# Patient Record
Sex: FEMALE | Race: WHITE | NOT HISPANIC OR LATINO | Employment: FULL TIME | URBAN - METROPOLITAN AREA
[De-identification: names, ages, dates, MRNs, and addresses within clinical notes are randomized per-mention and may not be internally consistent; named-entity substitution may affect disease eponyms.]

---

## 2017-02-14 ENCOUNTER — GENERIC CONVERSION - ENCOUNTER (OUTPATIENT)
Dept: OTHER | Facility: OTHER | Age: 42
End: 2017-02-14

## 2017-12-21 ENCOUNTER — ALLSCRIPTS OFFICE VISIT (OUTPATIENT)
Dept: OTHER | Facility: OTHER | Age: 42
End: 2017-12-21

## 2017-12-21 DIAGNOSIS — S49.91XA UNSPECIFIED INJURY OF RIGHT SHOULDER AND UPPER ARM, INITIAL ENCOUNTER: ICD-10-CM

## 2017-12-21 DIAGNOSIS — R10.11 RIGHT UPPER QUADRANT PAIN: ICD-10-CM

## 2017-12-21 DIAGNOSIS — M54.50 LOW BACK PAIN: ICD-10-CM

## 2017-12-23 NOTE — PROGRESS NOTES
Assessment   1  Lumbago (724 2) (M54 5)   2  Recurrent major depressive episodes, moderate (296 32) (F33 1)   3  Abdominal pain, RUQ (789 01) (R10 11)   4  Anxiety (300 00) (F41 9)    Plan   Abdominal pain, RUQ    · US ABDOMEN LIMITED; Status:Hold For - Scheduling; Requested for:14Uiw3011; Anxiety, Depression with anxiety    · Vraylar 3 MG Oral Capsule; Take one by mouth daily  Depression with anxiety    · ALPRAZolam 0 5 MG Oral Tablet; TAKE ONE TABLET BY MOUTH THREE TIMES DAILY AS    NEEDED   · (1) TSH; Status:Active; Requested for:72Hne4534;   Esophageal reflux    · Omeprazole 40 MG Oral Capsule Delayed Release; TAKE 1 CAPSULE DAILY  Hyperlipidemia    · (1) COMPREHENSIVE METABOLIC PANEL; Status:Active; Requested for:42Ljr5492;    · (1) LIPID PANEL FASTING W DIRECT LDL REFLEX; Status:Active; Requested for:60Ajd2267;   Hypertension    · (1) CBC/PLT/DIFF; Status:Active; Requested for:42Zwv8894;   Lumbago    · *1 - [facility]  *  Status: Active  Requested for: 96Hch8849  Care Summary provided  : Yes  Visit for screening mammogram    · MAMMO SCREENING BILATERAL W 3D & CAD; Status:Hold For - Scheduling; Requested    for:55Jev5634;   Vitamin D deficiency    · (1) VITAMIN D 25-HYDROXY; Status:Active; Requested for:23Jxb7284;     Discussion/Summary   Discussion Summary:    Check routine labs  Will get ultrasound RUQ to evaluate GI complaints  If negative consider HIDA scan  Will start vraylar to help with ongoing depression and anhedonia  Contact info given for [facility]  Will also refer to PT for eval of ongoing back pain  Chief Complaint   Chief Complaint Free Text Note Form: Patient is being seen today for a follow up visit  Patient c/o abdominal pain, back pain, fluid retention and would like to discuss her depression  Chief Complaint Chronic Condition [facility]: Patient is here today for follow up of chronic conditions described in HPI        History of Present Illness   HPI: Pt presents with several complaints  She has not been seen in 1 year  She complains of ongoing depression  She has poor energy and motivation  She is on celexa which has provided some benefit  She previously tried fetzima, wellbutrin, abilify and rexulti but could not tolerate most of them  also complains of abdominal cramping and diarrhea shortly after eating  Sometimes she gets nauseated  This has been episodic over the last several months  is also having ongoing low back pain  The motrin does help  She is not interested in more medication but is interested in trying physical therapy  Review of Systems   Complete-Female:      Constitutional: as noted in HPI  Eyes: No complaints of eye pain, no red eyes, no eyesight problems, no discharge, no dry eyes, no itching of eyes  ENT: no complaints of earache, no loss of hearing, no nose bleeds, no nasal discharge, no sore throat, no hoarseness  Cardiovascular: No complaints of slow heart rate, no fast heart rate, no chest pain, no palpitations, no leg claudication, no lower extremity edema  Respiratory: No complaints of shortness of breath, no wheezing, no cough, no SOB on exertion, no orthopnea, no PND  Gastrointestinal: as noted in HPI  Genitourinary: No complaints of dysuria, no incontinence, no pelvic pain, no dysmenorrhea, no vaginal discharge or bleeding  Musculoskeletal: as noted in HPI  Integumentary: No complaints of skin rash or lesions, no itching, no skin wounds, no breast pain or lump  Neurological: No complaints of headache, no confusion, no convulsions, no numbness, no dizziness or fainting, no tingling, no limb weakness, no difficulty walking  Psychiatric: as noted in HPI  ROS Reviewed:    ROS reviewed  Active Problems   1  Abdominal pain, RUQ (789 01) (R10 11)   2  Anxiety (300 00) (F41 9)   3  Contraceptives (V25 02)   4  Depression with anxiety (300 4) (F41 8)   5   Encounter for vitamin deficiency screening (V77 99) (Z13 21)   6  Esophageal reflux (530 81) (K21 9)   7  Hyperlipidemia (272 4) (E78 5)   8  Hypertension (401 9) (I10)   9  Lumbago (724 2) (M54 5)   10  Multiple joint pain (719 49) (M25 50)   11  Obesity (278 00) (E66 9)   12  Recurrent major depressive episodes, moderate (296 32) (F33 1)   13  Screening, anemia, deficiency, iron (V78 0) (Z13 0)   14  Sleep disorder (780 50) (G47 9)   15  Thyroid disorder screen (V77 0) (Z13 29)   16  Uncomplicated alcohol abuse (305 00) (F10 10)   17  Visit for screening (V82 9) (Z13 9)   18  Visit for screening mammogram (V76 12) (Z12 31)   19  Vitamin D deficiency (268 9) (E55 9)    Past Medical History   1  Acute upper respiratory infection (465 9) (J06 9)   2  Acute upper respiratory infection (465 9) (J06 9)   3  History of acute sinusitis (V12 69) (Z87 09)   4  History of candidiasis of mouth (V12 09) (Z86 19)   5  History of sciatica (V12 49) (Z86 69)   6  History of urticaria (V13 3) (Z87 2)   7  History of Skin rash (782 1) (R21)  Active Problems And Past Medical History Reviewed: The active problems and past medical history were reviewed and updated today  Surgical History   1  Contraceptives (V25 02)   2  History of Dental Surgery  Surgical History Reviewed: The surgical history was reviewed and updated today  Family History   Mother    1  Family history of Depression   2  Family history of Osteoporosis (V17 81)  Father    3  Family history of Cancer   4  Family history of Hypertension (V17 49)   5  Family history of Testicular Cancer (V16 43)  Family History    6  Family history of Cancer   7  Family history of Diabetes Mellitus (V18 0)   8  Family history of Heart Disease (V17 49)   9  Family history of Stroke Syndrome (V17 1)  Family History Reviewed: The family history was reviewed and updated today         Social History    · Being A Social Drinker   · Caffeine Use   · Denied: History of Drug Use   · Marital History - Currently    · Never A Smoker   · Occupation:   · Denied: History of Previous  Service   · Sexually Active Monogamous Relationship   · Uses Safety Equipment - Seatbelts   · Working Full Time  Social History Reviewed: The social history was reviewed and updated today  The social history was reviewed and is unchanged  Current Meds    1  ALPRAZolam 0 5 MG Oral Tablet; TAKE ONE TABLET BY MOUTH THREE TIMES DAILY AS NEEDED; Therapy: 69IER6807 to (Evaluate:02Dec2016); Last Rx:02Nov2016 Ordered   2  Citalopram Hydrobromide 40 MG Oral Tablet; TAKE 1 TABLET DAILY; Therapy: 58Ipk3165 to (Last Rx:39Wtm4525)  Requested for: 79Gjh8185 Ordered   3  Ibuprofen 200 MG Oral Tablet; Take 1-3 every 4-6 hours as needed; Therapy: 88YZE9028 to (Evaluate:09Jan2013); Last VO:34FBW5104 Ordered   4  Omeprazole 40 MG Oral Capsule Delayed Release; TAKE 1 CAPSULE DAILY; Therapy: 99LRL4101 to (Evaluate:12Mar2018)  Requested for: 93Jhq5213; Last Rx:58Rvn7085     Ordered  Medication List Reviewed: The medication list was reviewed and updated today  Allergies   1  No Known Drug Allergies  2  Seasonal    Vitals   Vital Signs    Recorded: 21Dec2017 08:37AM   Temperature 97 5 F, Tympanic   Heart Rate 99   Respiration 16   Systolic 526, Sitting   Diastolic 64, Sitting   Height 5 ft 3 in   Weight 232 lb 4 0 oz   BMI Calculated 41 14   BSA Calculated 2 06   O2 Saturation 98     Physical Exam        Constitutional      General appearance: No acute distress, well appearing and well nourished  Ears, Nose, Mouth, and Throat      External inspection of ears and nose: Normal        Otoscopic examination: Tympanic membranes translucent with normal light reflex  Canals patent without erythema  Nasal mucosa, septum, and turbinates: Normal without edema or erythema  Oropharynx: Normal with no erythema, edema, exudate or lesions         Pulmonary      Respiratory effort: No increased work of breathing or signs of respiratory distress  Auscultation of lungs: Clear to auscultation  Cardiovascular      Auscultation of heart: Normal rate and rhythm, normal S1 and S2, without murmurs  Examination of extremities for edema and/or varicosities: Normal        Carotid pulses: Normal        Abdomen      Abdomen: Non-tender, no masses  Psychiatric      Orientation to person, place, and time: Normal        Mood and affect: Normal           Results/Data   PHQ-9 Adult Depression Screening 91Mik5117 08:45AM User, s      Test Name Result Flag Reference   PHQ-9 Adult Depression Score 12     Over the last two weeks, how often have you been bothered by any of the following problems? Little interest or pleasure in doing things: Several days - 1     Feeling down, depressed, or hopeless: Several days - 1     Trouble falling or staying asleep, or sleeping too much: Nearly every day - 3     Feeling tired or having little energy: Nearly every day - 3     Poor appetite or over eating: Nearly every day - 3     Feeling bad about yourself - or that you are a failure or have let yourself or your family down: Several days - 1     Trouble concentrating on things, such as reading the newspaper or watching television: Not at all - 0     Moving or speaking so slowly that other people could have noticed   Or the opposite -  being so fidgety or restless that you have been moving around a lot more than usual: Not at all - 0     Thoughts that you would be better off dead, or of hurting yourself in some way: Not at all - 0   PHQ-9 Adult Depression Screening Positive     PHQ-9 Difficulty Level Somewhat difficult     PHQ-9 Severity Moderate Depression          Signatures    Electronically signed by : ROSALINDA Erickson; Dec 22 2017 11:47AM EST                       (Author)     Electronically signed by : GOMEZ Jacobsen ; Dec 22 2017  1:05PM EST                       (Author)

## 2018-01-10 ENCOUNTER — APPOINTMENT (OUTPATIENT)
Dept: RADIOLOGY | Facility: CLINIC | Age: 43
End: 2018-01-10
Payer: COMMERCIAL

## 2018-01-10 ENCOUNTER — OFFICE VISIT (OUTPATIENT)
Dept: URGENT CARE | Facility: CLINIC | Age: 43
End: 2018-01-10
Payer: COMMERCIAL

## 2018-01-10 DIAGNOSIS — S49.91XA UNSPECIFIED INJURY OF RIGHT SHOULDER AND UPPER ARM, INITIAL ENCOUNTER: ICD-10-CM

## 2018-01-10 PROCEDURE — S9088 SERVICES PROVIDED IN URGENT: HCPCS

## 2018-01-10 PROCEDURE — 73030 X-RAY EXAM OF SHOULDER: CPT

## 2018-01-10 PROCEDURE — 99204 OFFICE O/P NEW MOD 45 MIN: CPT

## 2018-01-13 NOTE — PROGRESS NOTES
Assessment   1  Injury of right shoulder, initial encounter (959 2) (S49 91XA)    Plan   Injury of right shoulder, initial encounter    · * XR SHOULDER 2+ VIEW RIGHT; Status:Active; Requested OOW:13LRU9858; Discussion/Summary   Discussion Summary:    X-rays  Negative for any fracture  Recommend patient continue to do range-of-motion exercises  Ice every couple of hours for 20-30 minutes  Recommend ibuprofen help reduce inflammation  If no improvement over the next week, follow up with PCP  History of Present Illness   HPI: 82-year-old female here after slipping on the ice earlier today and landing on her right shoulder  Has excruciating right shoulder pain and still states that she is unable to move it and lift it above her head  Denies any numbness or tingling in her upper extremity  Review of Systems   Focused-Female:      Constitutional: No fever, no chills, feels well, no tiredness, no recent weight gain or loss  Cardiovascular: no complaints of slow or fast heart rate, no chest pain, no palpitations, no leg claudication or lower extremity edema  Respiratory: no complaints of shortness of breath, no wheezing, no dyspnea on exertion, no orthopnea or PND  Musculoskeletal: as noted in HPI  Neurological: no complaints of headache, no confusion, no numbness or tingling, no dizziness or fainting  ROS Reviewed:    ROS reviewed  Active Problems   1  Abdominal pain, RUQ (789 01) (R10 11)   2  Anxiety (300 00) (F41 9)   3  Contraceptives (V25 02)   4  Depression with anxiety (300 4) (F41 8)   5  Encounter for vitamin deficiency screening (V77 99) (Z13 21)   6  Esophageal reflux (530 81) (K21 9)   7  Hyperlipidemia (272 4) (E78 5)   8  Hypertension (401 9) (I10)   9  Lumbago (724 2) (M54 5)   10  Multiple joint pain (719 49) (M25 50)   11  Obesity (278 00) (E66 9)   12  Recurrent major depressive episodes, moderate (296 32) (F33 1)   13   Screening, anemia, deficiency, iron (V78 0) (Z13 0)   14  Sleep disorder (780 50) (G47 9)   15  Thyroid disorder screen (V77 0) (Z13 29)   16  Uncomplicated alcohol abuse (305 00) (F10 10)   17  Visit for screening (V82 9) (Z13 9)   18  Visit for screening mammogram (V76 12) (Z12 31)   19  Vitamin D deficiency (268 9) (E55 9)    Past Medical History   1  Acute upper respiratory infection (465 9) (J06 9)   2  Acute upper respiratory infection (465 9) (J06 9)   3  History of acute sinusitis (V12 69) (Z87 09)   4  History of candidiasis of mouth (V12 09) (Z86 19)   5  History of sciatica (V12 49) (Z86 69)   6  History of urticaria (V13 3) (Z87 2)   7  History of Skin rash (782 1) (R21)  Active Problems And Past Medical History Reviewed: The active problems and past medical history were reviewed and updated today  Family History   Mother    1  Family history of Depression   2  Family history of Osteoporosis (V17 81)  Father    3  Family history of Cancer   4  Family history of Hypertension (V17 49)   5  Family history of Testicular Cancer (V16 43)  Family History    6  Family history of Cancer   7  Family history of Diabetes Mellitus (V18 0)   8  Family history of Heart Disease (V17 49)   9  Family history of Stroke Syndrome (V17 1)  Family History Reviewed: The family history was reviewed and updated today  Social History    · Being A Social Drinker   · Caffeine Use   · Denied: History of Drug Use   · Marital History - Currently    · Never A Smoker   · Occupation:   · Denied: History of Previous  Service   · Sexually Active Monogamous Relationship   · Uses Safety Equipment - Seatbelts   · Working Full Time  Social History Reviewed: The social history was reviewed and updated today  The social history was reviewed and is unchanged  Surgical History   1  Contraceptives (V25 02)   2  History of Dental Surgery  Surgical History Reviewed: The surgical history was reviewed and updated today  Current Meds    1   ALPRAZolam 0 5 MG Oral Tablet; TAKE ONE TABLET BY MOUTH THREE TIMES DAILY     AS NEEDED; Therapy: 40QDA1948 to (Evaluate:20Jan2018); Last Rx:65Tdp5535 Ordered   2  Citalopram Hydrobromide 40 MG Oral Tablet; TAKE 1 TABLET DAILY; Therapy: 31Lly7734 to (Last Rx:92Egl9522)  Requested for: 41Xaq9918 Ordered   3  Ibuprofen 200 MG Oral Tablet; Take 1-3 every 4-6 hours as needed; Therapy: 68FHS5659 to (Evaluate:09Jan2013); Last WE:09PKV4498 Ordered   4  Omeprazole 40 MG Oral Capsule Delayed Release; TAKE 1 CAPSULE DAILY; Therapy: 08ANR3294 to (Evaluate:97Hyy7609)  Requested for: 10Hke0722; Last     Rx:56Btx1759 Ordered   5  Vraylar 3 MG Oral Capsule; Take one by mouth daily; Therapy: 05Jvc7636 to (Last Dellia Slight)  Requested for: 49Gus8928 Ordered  Medication List Reviewed: The medication list was reviewed and updated today  Allergies   1  No Known Drug Allergies  2  Seasonal    Vitals   Signs   Recorded: 56MBL0416 06:40PM   Temperature: 97 1 F  Heart Rate: 68  Respiration: 16  Systolic: 750  Diastolic: 68    Physical Exam        Constitutional      General appearance: No acute distress, well appearing and well nourished  Pulmonary      Respiratory effort: No increased work of breathing or signs of respiratory distress  Auscultation of lungs: Clear to auscultation  Cardiovascular      Auscultation of heart: Normal rate and rhythm, normal S1 and S2, without murmurs  Musculoskeletal      Gait and station: Normal  -- Right shoulder with limited range of motion overhead with anterior flexion and with abduction and abduction  Able to internally and externally rotate  Sensory and neuro intact grossly  Pulses +2 bilateral radial  Nontender to palpation over muscles of the rotator cuff  Tender to palpation over humeral head        Signatures    Electronically signed by : Devan Claros [facility]; Romeo 10 2018  6:42PM EST                       (Author)     Electronically signed by : Ann Quintana FABIAN Curran O ; Jan 12 2018 10:25AM EST                       (Co-author)

## 2018-01-13 NOTE — MISCELLANEOUS
Message  Return to work or school:   Tomeka Hamm is under my professional care  She was seen in my office on   She is able to return to work on  [id number]      Please excuse from work 02/13/2017 any questions contact our office          Signatures   Electronically signed by : Dann Schafer, ; Feb 14 2017  1:30PM EST                       (Author)

## 2018-01-23 VITALS
HEART RATE: 68 BPM | SYSTOLIC BLOOD PRESSURE: 118 MMHG | TEMPERATURE: 97.1 F | DIASTOLIC BLOOD PRESSURE: 68 MMHG | RESPIRATION RATE: 16 BRPM

## 2018-01-23 VITALS
HEIGHT: 63 IN | SYSTOLIC BLOOD PRESSURE: 116 MMHG | WEIGHT: 232.25 LBS | BODY MASS INDEX: 41.15 KG/M2 | OXYGEN SATURATION: 98 % | RESPIRATION RATE: 16 BRPM | DIASTOLIC BLOOD PRESSURE: 64 MMHG | TEMPERATURE: 97.5 F | HEART RATE: 99 BPM

## 2018-01-24 NOTE — MISCELLANEOUS
Message  Return to work or school:   Darlene Townsend is under my professional care   She was seen in my office on 1/10/18    She is not able to return to work until 1/12/18           Signatures   Electronically signed by : Janelle Millan, [facility]; Romeo 10 2018  6:32PM EST                       (Author)

## 2018-02-10 DIAGNOSIS — F33.41 RECURRENT MAJOR DEPRESSIVE DISORDER, IN PARTIAL REMISSION (HCC): Primary | ICD-10-CM

## 2018-02-12 RX ORDER — CITALOPRAM 40 MG/1
TABLET ORAL
Qty: 30 TABLET | Refills: 0 | Status: SHIPPED | OUTPATIENT
Start: 2018-02-12 | End: 2018-04-03 | Stop reason: SDUPTHER

## 2018-04-03 DIAGNOSIS — F33.41 RECURRENT MAJOR DEPRESSIVE DISORDER, IN PARTIAL REMISSION (HCC): ICD-10-CM

## 2018-04-03 RX ORDER — CITALOPRAM 40 MG/1
TABLET ORAL
Qty: 30 TABLET | Refills: 0 | Status: SHIPPED | OUTPATIENT
Start: 2018-04-03 | End: 2018-06-21 | Stop reason: SDUPTHER

## 2018-06-21 DIAGNOSIS — F33.41 RECURRENT MAJOR DEPRESSIVE DISORDER, IN PARTIAL REMISSION (HCC): ICD-10-CM

## 2018-06-21 RX ORDER — CITALOPRAM 40 MG/1
TABLET ORAL
Qty: 30 TABLET | Refills: 0 | Status: SHIPPED | OUTPATIENT
Start: 2018-06-21 | End: 2018-09-05 | Stop reason: SDUPTHER

## 2018-07-09 ENCOUNTER — OFFICE VISIT (OUTPATIENT)
Dept: FAMILY MEDICINE CLINIC | Facility: CLINIC | Age: 43
End: 2018-07-09
Payer: COMMERCIAL

## 2018-07-09 VITALS
HEART RATE: 68 BPM | WEIGHT: 212 LBS | TEMPERATURE: 98.9 F | DIASTOLIC BLOOD PRESSURE: 84 MMHG | BODY MASS INDEX: 37.56 KG/M2 | SYSTOLIC BLOOD PRESSURE: 138 MMHG | RESPIRATION RATE: 20 BRPM | HEIGHT: 63 IN

## 2018-07-09 DIAGNOSIS — M54.31 RIGHT SIDED SCIATICA: Primary | ICD-10-CM

## 2018-07-09 PROBLEM — M54.50 LOW BACK PAIN: Status: ACTIVE | Noted: 2017-12-21

## 2018-07-09 PROCEDURE — 99214 OFFICE O/P EST MOD 30 MIN: CPT | Performed by: PHYSICIAN ASSISTANT

## 2018-07-09 RX ORDER — METHYLPREDNISOLONE 4 MG/1
TABLET ORAL
Qty: 21 TABLET | Refills: 0 | Status: SHIPPED | OUTPATIENT
Start: 2018-07-09 | End: 2018-07-16 | Stop reason: ALTCHOICE

## 2018-07-09 RX ORDER — ALPRAZOLAM 0.5 MG/1
1 TABLET ORAL 3 TIMES DAILY PRN
COMMUNITY
Start: 2014-03-05 | End: 2019-10-16 | Stop reason: SDUPTHER

## 2018-07-09 RX ORDER — CYCLOBENZAPRINE HCL 10 MG
10 TABLET ORAL 3 TIMES DAILY PRN
Qty: 30 TABLET | Refills: 0 | Status: SHIPPED | OUTPATIENT
Start: 2018-07-09 | End: 2019-03-13 | Stop reason: ALTCHOICE

## 2018-07-09 RX ORDER — OMEPRAZOLE 40 MG/1
1 CAPSULE, DELAYED RELEASE ORAL DAILY
COMMUNITY
Start: 2014-12-19 | End: 2018-07-09 | Stop reason: ALTCHOICE

## 2018-07-09 RX ORDER — KETOROLAC TROMETHAMINE 30 MG/ML
60 INJECTION, SOLUTION INTRAMUSCULAR; INTRAVENOUS ONCE
Status: COMPLETED | OUTPATIENT
Start: 2018-07-09 | End: 2018-07-09

## 2018-07-09 RX ADMIN — KETOROLAC TROMETHAMINE 60 MG: 30 INJECTION, SOLUTION INTRAMUSCULAR; INTRAVENOUS at 15:48

## 2018-07-09 NOTE — ASSESSMENT & PLAN NOTE
Pt with flare of right sciatica with new symptoms of urinary incontinence and increased pain severity without less provoking  Will update MRI  Will give 60mg toradol, medrol dose pack and muscle relaxer  Pt counseled that the muscle relaxer may make her drowsy

## 2018-07-09 NOTE — PROGRESS NOTES
Assessment/Plan:    Right sided sciatica  Pt with flare of right sciatica with new symptoms of urinary incontinence and increased pain severity without less provoking  Will update MRI  Will give 60mg toradol, medrol dose pack and muscle relaxer  Pt counseled that the muscle relaxer may make her drowsy  Diagnoses and all orders for this visit:    Right sided sciatica  -     Methylprednisolone 4 MG TBPK; Use as directed on package  -     cyclobenzaprine (FLEXERIL) 10 mg tablet; Take 1 tablet (10 mg total) by mouth 3 (three) times a day as needed for muscle spasms  -     MRI lumbar spine w wo contrast; Future  -     ketorolac (TORADOL) 60 mg/2 mL IM injection 60 mg; Inject 2 mL (60 mg total) into a muscle once     Other orders  -     ALPRAZolam (XANAX) 0 5 mg tablet; Take 1 tablet by mouth 3 (three) times a day as needed  -     Discontinue: omeprazole (PriLOSEC) 40 MG capsule; Take 1 capsule by mouth daily  -     Discontinue: Brexpiprazole (REXULTI) 1 MG tablet; Take by mouth daily  -     Discontinue: cariprazine (VRAYLAR) 3 MG capsule; Take by mouth daily          Subjective:      Patient ID: Kristi Espinal is a 41 y o  female  Back Pain   This is a recurrent problem  The current episode started in the past 7 days  The problem occurs constantly  The problem has been gradually worsening since onset  The pain is present in the lumbar spine and gluteal  The quality of the pain is described as aching, shooting and stabbing  The pain radiates to the right thigh  The pain is at a severity of 8/10  The pain is severe  The pain is the same all the time  The symptoms are aggravated by position  Associated symptoms include bladder incontinence, leg pain, numbness, paresthesias and tingling  Pertinent negatives include no abdominal pain, chest pain, dysuria, fever, headaches or weakness  Risk factors include obesity  She has tried analgesics for the symptoms  The treatment provided no relief         The following portions of the patient's history were reviewed and updated as appropriate:   She  has a past medical history of Anxiety and Depression  She   Patient Active Problem List    Diagnosis Date Noted    Right sided sciatica 07/09/2018    Low back pain 12/21/2017    Recurrent major depressive episodes, moderate ([facility] [address] ) 04/15/2013    Hyperlipidemia 11/13/2012    Hypertension 11/13/2012     She  has no past surgical history on file  Her family history includes Depression in her mother; Testicular cancer in her father  She  reports that she has never smoked  She has never used smokeless tobacco  She reports that she drinks about 1 8 oz of alcohol per week   She reports that she does not use drugs  Current Outpatient Prescriptions   Medication Sig Dispense Refill    ALPRAZolam (XANAX) 0 5 mg tablet Take 1 tablet by mouth 3 (three) times a day as needed      citalopram (CeleXA) 40 mg tablet take 1 tablet by mouth once daily 30 tablet 0    cyclobenzaprine (FLEXERIL) 10 mg tablet Take 1 tablet (10 mg total) by mouth 3 (three) times a day as needed for muscle spasms 30 tablet 0    Methylprednisolone 4 MG TBPK Use as directed on package 21 tablet 0     No current facility-administered medications for this visit  Current Outpatient Prescriptions on File Prior to Visit   Medication Sig    citalopram (CeleXA) 40 mg tablet take 1 tablet by mouth once daily     No current facility-administered medications on file prior to visit  She is allergic to pollen extract       Review of Systems   Constitutional: Negative for chills and fever  HENT: Negative for congestion, ear pain, hearing loss, postnasal drip, rhinorrhea, sinus pain, sinus pressure, sore throat and trouble swallowing  Eyes: Negative for pain and visual disturbance  Respiratory: Negative for cough, chest tightness, shortness of breath and wheezing  Cardiovascular: Negative  Negative for chest pain, palpitations and leg swelling  Gastrointestinal: Negative for abdominal pain, blood in stool, constipation, diarrhea, nausea and vomiting  Endocrine: Negative for cold intolerance, heat intolerance, polydipsia, polyphagia and polyuria  Genitourinary: Positive for bladder incontinence  Negative for difficulty urinating, dysuria, flank pain and urgency  Musculoskeletal: Positive for back pain and gait problem  Negative for arthralgias and myalgias  Skin: Negative for rash  Allergic/Immunologic: Negative  Neurological: Positive for tingling, numbness and paresthesias  Negative for dizziness, weakness, light-headedness and headaches  Hematological: Negative  Psychiatric/Behavioral: Negative for behavioral problems, dysphoric mood and sleep disturbance  The patient is not nervous/anxious  Objective:      /84 (BP Location: Left arm, Patient Position: Sitting, Cuff Size: Large)   Pulse 68   Temp 98 9 °F (37 2 °C) (Tympanic)   Resp 20   Ht 5' 3" (1 6 m)   Wt 96 2 kg (212 lb)   LMP 06/30/2018 (Exact Date)   BMI 37 55 kg/m²          Physical Exam   Constitutional: She is oriented to person, place, and time  She appears well-developed and well-nourished  No distress  HENT:   Head: Normocephalic and atraumatic  Right Ear: External ear normal    Left Ear: External ear normal    Nose: Nose normal    Mouth/Throat: Oropharynx is clear and moist  No oropharyngeal exudate  Eyes: Conjunctivae and EOM are normal  Pupils are equal, round, and reactive to light  Right eye exhibits no discharge  Left eye exhibits no discharge  No scleral icterus  Neck: Normal range of motion  Neck supple  No thyromegaly present  Cardiovascular: Normal rate, regular rhythm and normal heart sounds  Exam reveals no gallop and no friction rub  No murmur heard  Pulmonary/Chest: Effort normal and breath sounds normal  No respiratory distress  She has no wheezes  She has no rales  Abdominal: Soft   Bowel sounds are normal  She exhibits no distension  There is no tenderness  Musculoskeletal: Normal range of motion  She exhibits no edema or deformity  Lumbar back: She exhibits tenderness, pain and spasm  Neurological: She is alert and oriented to person, place, and time  No cranial nerve deficit  Skin: Skin is warm and dry  She is not diaphoretic  Psychiatric: She has a normal mood and affect   Her behavior is normal  Judgment and thought content normal

## 2018-07-11 ENCOUNTER — HOSPITAL ENCOUNTER (OUTPATIENT)
Dept: MRI IMAGING | Facility: HOSPITAL | Age: 43
Discharge: HOME | End: 2018-07-11
Payer: COMMERCIAL

## 2018-07-11 DIAGNOSIS — M54.31 RIGHT SIDED SCIATICA: ICD-10-CM

## 2018-07-11 PROCEDURE — 72158 MRI LUMBAR SPINE W/O & W/DYE: CPT

## 2018-07-11 PROCEDURE — A9585 GADOBUTROL INJECTION: HCPCS | Performed by: RADIOLOGY

## 2018-07-11 RX ADMIN — GADOBUTROL 9 ML: 604.72 INJECTION INTRAVENOUS at 18:14

## 2018-07-13 ENCOUNTER — TELEPHONE (OUTPATIENT)
Dept: INTERNAL MEDICINE CLINIC | Facility: CLINIC | Age: 43
End: 2018-07-13

## 2018-07-13 NOTE — TELEPHONE ENCOUNTER
Pt called, you put in order for her to see Ortho for her back , pt asking if you think she would benefit from PT more because of the pain? pls  advise

## 2018-07-16 ENCOUNTER — OFFICE VISIT (OUTPATIENT)
Dept: FAMILY MEDICINE CLINIC | Facility: CLINIC | Age: 43
End: 2018-07-16
Payer: COMMERCIAL

## 2018-07-16 VITALS
SYSTOLIC BLOOD PRESSURE: 124 MMHG | WEIGHT: 215.2 LBS | BODY MASS INDEX: 38.13 KG/M2 | HEART RATE: 84 BPM | HEIGHT: 63 IN | RESPIRATION RATE: 20 BRPM | TEMPERATURE: 99.1 F | DIASTOLIC BLOOD PRESSURE: 84 MMHG

## 2018-07-16 DIAGNOSIS — M54.41 ACUTE RIGHT-SIDED LOW BACK PAIN WITH RIGHT-SIDED SCIATICA: Primary | ICD-10-CM

## 2018-07-16 PROCEDURE — 99213 OFFICE O/P EST LOW 20 MIN: CPT | Performed by: PHYSICIAN ASSISTANT

## 2018-07-16 NOTE — ASSESSMENT & PLAN NOTE
Work note extended  Continue OTC NSAID and glucosamine   Keep upcoming appt with ortho and schedule PT

## 2018-07-16 NOTE — LETTER
July 16, 2018     Patient: Melo Waddell   YOB: 1975   Date of Visit: 7/16/2018       To Whom it May Concern:    Kianna Flaquita is under my professional care  She was seen in my office on 7/16/2018  She may return to work on 7/31/18  If you have any questions or concerns, please don't hesitate to call           Sincerely,          Evy Parnell PA-C        CC: No Recipients

## 2018-07-16 NOTE — PROGRESS NOTES
Assessment/Plan:    Low back pain  Work note extended  Continue OTC NSAID and glucosamine  Keep upcoming appt with ortho and schedule PT  Diagnoses and all orders for this visit:    Acute right-sided low back pain with right-sided sciatica  -     Ambulatory referral to Physical Therapy; Future          Subjective:      Patient ID: Cinthia Garza is a 41 y o  female  Pt presents for follow up on her right sided low back pain with sciatica  She had an MRI with showed disc protrusion and multilevel degenerative spondylosis  She was started on steroid taper, given 60mg toradol and flexeril  She has not used the muscle relaxer  Her pain is somewhat improved since onset  She has an appt with ortho at the end of July and is also getting set up with PT  She is hesitant to return to work at this time as her symptoms are still moderate and she does not want to create further injury  The following portions of the patient's history were reviewed and updated as appropriate:   She  has a past medical history of Anxiety; Candidiasis of mouth; Depression; Obesity; Sciatica; Skin rash; and Urticaria  She   Patient Active Problem List    Diagnosis Date Noted    Right sided sciatica 07/09/2018    Low back pain 12/21/2017    Recurrent major depressive episodes, moderate ([facility] [address] ) 04/15/2013    Hyperlipidemia 11/13/2012    Hypertension 11/13/2012     She  has a past surgical history that includes Dental surgery  Her family history includes Cancer in her family and father; Depression in her mother; Diabetes in her family; Heart disease in her family; Hypertension in her father; Osteoporosis in her mother; Stroke in her family; Testicular cancer in her father  She  reports that she has never smoked  She has never used smokeless tobacco  She reports that she drinks about 1 8 oz of alcohol per week   She reports that she does not use drugs    Current Outpatient Prescriptions   Medication Sig Dispense Refill    ALPRAZolam (XANAX) 0 5 mg tablet Take 1 tablet by mouth 3 (three) times a day as needed      citalopram (CeleXA) 40 mg tablet take 1 tablet by mouth once daily 30 tablet 0    cyclobenzaprine (FLEXERIL) 10 mg tablet Take 1 tablet (10 mg total) by mouth 3 (three) times a day as needed for muscle spasms 30 tablet 0     No current facility-administered medications for this visit  Current Outpatient Prescriptions on File Prior to Visit   Medication Sig    ALPRAZolam (XANAX) 0 5 mg tablet Take 1 tablet by mouth 3 (three) times a day as needed    citalopram (CeleXA) 40 mg tablet take 1 tablet by mouth once daily    cyclobenzaprine (FLEXERIL) 10 mg tablet Take 1 tablet (10 mg total) by mouth 3 (three) times a day as needed for muscle spasms    [DISCONTINUED] Methylprednisolone 4 MG TBPK Use as directed on package     No current facility-administered medications on file prior to visit  She is allergic to pollen extract       Review of Systems   Constitutional: Negative for chills and fever  HENT: Negative for congestion, ear pain, hearing loss, postnasal drip, rhinorrhea, sinus pain, sinus pressure, sore throat and trouble swallowing  Eyes: Negative for pain and visual disturbance  Respiratory: Negative for cough, chest tightness, shortness of breath and wheezing  Cardiovascular: Negative  Negative for chest pain, palpitations and leg swelling  Gastrointestinal: Negative for abdominal pain, blood in stool, constipation, diarrhea, nausea and vomiting  Endocrine: Negative for cold intolerance, heat intolerance, polydipsia, polyphagia and polyuria  Genitourinary: Negative for difficulty urinating, dysuria, flank pain and urgency  Musculoskeletal: Positive for back pain  Negative for arthralgias, gait problem and myalgias  Skin: Negative for rash  Allergic/Immunologic: Negative  Neurological: Negative for dizziness, weakness, light-headedness and headaches  Hematological: Negative  Psychiatric/Behavioral: Negative for behavioral problems, dysphoric mood and sleep disturbance  The patient is not nervous/anxious  Objective:      /84 (BP Location: Left arm, Patient Position: Sitting, Cuff Size: Large)   Pulse 84   Temp 99 1 °F (37 3 °C) (Tympanic)   Resp 20   Ht 5' 3" (1 6 m)   Wt 97 6 kg (215 lb 3 2 oz)   LMP 06/30/2018 (Exact Date)   BMI 38 12 kg/m²          Physical Exam   Constitutional: She is oriented to person, place, and time  She appears well-developed and well-nourished  No distress  HENT:   Head: Normocephalic and atraumatic  Right Ear: External ear normal    Left Ear: External ear normal    Nose: Nose normal    Mouth/Throat: Oropharynx is clear and moist  No oropharyngeal exudate  Eyes: Conjunctivae and EOM are normal  Pupils are equal, round, and reactive to light  Right eye exhibits no discharge  Left eye exhibits no discharge  No scleral icterus  Neck: Normal range of motion  Neck supple  No thyromegaly present  Cardiovascular: Normal rate, regular rhythm and normal heart sounds  Exam reveals no gallop and no friction rub  No murmur heard  Pulmonary/Chest: Effort normal and breath sounds normal  No respiratory distress  She has no wheezes  She has no rales  Abdominal: Soft  Bowel sounds are normal  She exhibits no distension  There is no tenderness  Musculoskeletal: Normal range of motion  She exhibits no edema, tenderness or deformity  Lumbar back: She exhibits pain and spasm  Neurological: She is alert and oriented to person, place, and time  No cranial nerve deficit  Skin: Skin is warm and dry  She is not diaphoretic  Psychiatric: She has a normal mood and affect   Her behavior is normal  Judgment and thought content normal

## 2018-07-23 ENCOUNTER — OFFICE VISIT (OUTPATIENT)
Dept: OBGYN CLINIC | Facility: HOSPITAL | Age: 43
End: 2018-07-23
Payer: COMMERCIAL

## 2018-07-23 ENCOUNTER — TELEPHONE (OUTPATIENT)
Dept: OBGYN CLINIC | Facility: HOSPITAL | Age: 43
End: 2018-07-23

## 2018-07-23 VITALS
SYSTOLIC BLOOD PRESSURE: 148 MMHG | HEART RATE: 85 BPM | WEIGHT: 215 LBS | BODY MASS INDEX: 38.09 KG/M2 | HEIGHT: 63 IN | DIASTOLIC BLOOD PRESSURE: 93 MMHG

## 2018-07-23 DIAGNOSIS — M54.16 RADICULOPATHY, LUMBAR REGION: Primary | ICD-10-CM

## 2018-07-23 PROCEDURE — 99203 OFFICE O/P NEW LOW 30 MIN: CPT | Performed by: ORTHOPAEDIC SURGERY

## 2018-07-23 RX ORDER — METHYLPREDNISOLONE 4 MG/1
TABLET ORAL
Qty: 21 TABLET | Refills: 0 | Status: SHIPPED | OUTPATIENT
Start: 2018-07-23 | End: 2018-08-14

## 2018-07-23 RX ORDER — GABAPENTIN 300 MG/1
300 CAPSULE ORAL 2 TIMES DAILY
Qty: 60 CAPSULE | Refills: 0 | Status: SHIPPED | OUTPATIENT
Start: 2018-07-23 | End: 2019-03-13 | Stop reason: ALTCHOICE

## 2018-07-23 NOTE — PROGRESS NOTES
41 y o female presents as a new patient who has had 2 weeks of low back pain with radiation to her right lateral thigh and anterior thigh occasional to the anterior aspect of her right leg  She feels that this began when she stepped out of her kayak incorrectly with a twisting motion to her lumbar spine and she also had an incident where she fell on her front porch was wet  She has had an oral steroid pack which did not help her pain  Ibuprofen currently helps her pain  She does have a physical therapy appointment set for this upcoming Wednesday  She denies any numbness or tingling or motor or sensory deficits  Review of Systems  Review of systems negative unless otherwise specified in HPI    Past Medical History  Past Medical History:   Diagnosis Date    Anxiety     Candidiasis of mouth     Last Assessed [id number]    Depression     Obesity     Sciatica     Last Assessed [id number]    Skin rash     Last Assessed [id number]    Urticaria     Last Assessed [id number]       Past Surgical History  Past Surgical History:   Procedure Laterality Date    DENTAL SURGERY         Current Medications  Current Outpatient Prescriptions on File Prior to Visit   Medication Sig Dispense Refill    ALPRAZolam (XANAX) 0 5 mg tablet Take 1 tablet by mouth 3 (three) times a day as needed      citalopram (CeleXA) 40 mg tablet take 1 tablet by mouth once daily 30 tablet 0    cyclobenzaprine (FLEXERIL) 10 mg tablet Take 1 tablet (10 mg total) by mouth 3 (three) times a day as needed for muscle spasms 30 tablet 0     No current facility-administered medications on file prior to visit          Recent Labs [facility])    0  Lab Value Date/Time   HCT 43 0 02/17/2015 1243   HGB 14 5 02/17/2015 1243   WBC 5 65 02/17/2015 1243   GLUCOSE 91 02/17/2015 1243   HGBA1C 5 6 08/15/2016 1005         Physical exam  General: Awake, Alert, Oriented  HEENT: No scleral injection, no evidence of facial trauma  Heart: Extremities warm and well perfused  Lungs: No audible wheezing  ·   Back exam/bilateral LE  · Negative straight leg raise bilaterally  · Mild pain elicited at the right-sided SI joint with ANMOL test  · 5/5 bilateral lower extremities all muscle groups testing  · Sensation intact L2-S1  · Bilateral extremities warm and well perfused    Procedure  None    Imaging  The MRI of lumbar spine reveals mild stenosis with no significant nerve root encroachment    Assessment plan:  79-year-old female with low back pain secondary to twisting injury and right lower extremity radicular pain, not consistent with acute disc herniation, pain likely secondary to nerve irritability and back pain likely consistent with strain    Plan: WBAT all extremiies  Keep therapy appointment this Weds and begin core strengthening at this time  Will prescribe repeat medrol dose pack as well as gabapentin 300 mg bid  Referral to pain management ASAP for consideration of epidural steroid injections and possible R SI joint injection  Patient highly motivated to return to work as [employer] worker  Follow-up TOÑO Helms  07/23/18

## 2018-07-23 NOTE — TELEPHONE ENCOUNTER
Mathieu Valdes  Patient says the [facility] pharmacy does not have the gabapetin order  Can you please call this in? She is there now and lives 1 hr away

## 2018-07-25 ENCOUNTER — EVALUATION (OUTPATIENT)
Dept: PHYSICAL THERAPY | Facility: CLINIC | Age: 43
End: 2018-07-25
Payer: COMMERCIAL

## 2018-07-25 DIAGNOSIS — M54.41 ACUTE BACK PAIN WITH SCIATICA, RIGHT: Primary | ICD-10-CM

## 2018-07-25 PROCEDURE — 97535 SELF CARE MNGMENT TRAINING: CPT | Performed by: PHYSICAL MEDICINE & REHABILITATION

## 2018-07-25 PROCEDURE — 97140 MANUAL THERAPY 1/> REGIONS: CPT | Performed by: PHYSICAL MEDICINE & REHABILITATION

## 2018-07-25 PROCEDURE — G8990 OTHER PT/OT CURRENT STATUS: HCPCS | Performed by: PHYSICAL MEDICINE & REHABILITATION

## 2018-07-25 PROCEDURE — G8991 OTHER PT/OT GOAL STATUS: HCPCS | Performed by: PHYSICAL MEDICINE & REHABILITATION

## 2018-07-25 PROCEDURE — 97110 THERAPEUTIC EXERCISES: CPT | Performed by: PHYSICAL MEDICINE & REHABILITATION

## 2018-07-25 PROCEDURE — 97161 PT EVAL LOW COMPLEX 20 MIN: CPT | Performed by: PHYSICAL MEDICINE & REHABILITATION

## 2018-07-25 NOTE — PROGRESS NOTES
PT Evaluation     Today's date: 2018  Patient name: Dasha Godoy  : 1975  MRN: [id number]  Referring provider: Chad Obando PA-C  Dx:   Encounter Diagnosis     ICD-10-CM    1  Acute back pain with sciatica, right M54 41                   Assessment  Impairments: abnormal coordination, abnormal muscle firing, abnormal muscle tone, abnormal or restricted ROM, abnormal movement, activity intolerance, impaired physical strength, lacks appropriate home exercise program, pain with function and poor posture     Assessment details: Dasha Godoy is a pleasant 41 y o  female who presents with exacerbation of R L/S and R LE radicular pain  She demonstrates restricted AROM L/S with pain, reduced sensation over R thigh, weakness R proximal hip, and radicular pain in R hip/thigh  Pertinent PT findings today are a + slump test, repeated episodes of LBP, SLR >90*, reduced sx with repeated L/S extension and prone PA mobilizations L/S, and + prone instability test L/S  The patient's greatest concerns are the pain she is experiencing, worry over not knowing what's wrong and fear of not being able to keep active  No further referral appears necessary at this time based upon examination results  Primary movement impairment diagnosis of poor lumbopelvic movement coordination resulting in pathoanatomical symptoms of Acute back pain with sciatica, right  (primary encounter diagnosis) and limiting her ability to exercise or recreation, lift, perform household chores, perform yard work, squat to  objects from the floor, stand and walk    Impairments include:  1) poor lumbopelvic movement coordination - addressing with neuromotor retraining   2) poor lifting mechanics - addressing with functional retraining  3) transfer intolerance - addressing with functional retraining   4) Reduced proximal hip and core strength- addressing with therapeutic exercise training  5) Increase in sx with prolonged sitting/reduced sx with extension- addressing with therapeutic exercise and manual therapy treatments   Etiologic factors include recent heavy lifting  Understanding of Dx/Px/POC: good   Prognosis: good  Prognosis details: Positive prognostic indicators include positive attitude toward recovery, good understanding of diagnosis and treatment plan options, acuity of symptoms and absence of observed red flags  Negative prognostic indicators include anxiety, depression, hypertension and obesity  Goals  STGs to be achoeved in 4-6 weeks:  Patient will demonstrate increased L/S flexion AROM grossly by at least 50% from [facility] without increase in sx  Patient will demonstrate increased proximal hip strength by at least 1/2 MMT grossly without pain/sx  Patient will demonstrate I with proper TA/core mm activation with exercise training with 100% consistency without need for cues  Patient will note no sx and no change in sensation below R L/S proximally for the past week or more  LTGs- To be achieved in 8-12 weeks:  Patient will be independent with home exercise program    Patient will be able to manage symptoms independently  Patient will be able to roll in bed without limitation due to pain  Patient will be able to get in/out of her car without limitation due to pain  Patient will be able to get in/out of bed without limitation due to pain  Patient will be able to squat to  objects from the floor without limitation due to pain  Patient will be able to lift without limitation due to pain      Plan  Patient would benefit from: skilled PT  Referral necessary: No  Planned modality interventions: thermotherapy: hydrocollator packs and cryotherapy  Planned therapy interventions: abdominal trunk stabilization, activity modification, joint mobilization, manual therapy, motor coordination training, neuromuscular re-education, patient education, self care, therapeutic activities, therapeutic exercise, home exercise program, behavior modification, postural training, transfer training and graded activity  Frequency: 2x week  Duration in visits: 12  Duration in weeks: 6  Treatment plan discussed with: patient  Plan details: Prognosis above is given PT services 2x/week tapering to 1x/week over the next 2 months and home program adherence  Subjective Evaluation    History of Present Illness  Mechanism of injury: Pt notes hx of R LBP and sciatica since   Notes no specific traumatic event however notes she has frequently "thrown her back out" with activity since  Notes sx "went away on their own" with ibuprofen after about a week per pt  Notes present sx began about 3 weeks ago; notes she was attempting to get out of a kayak with onset of mm pain in R LB; persisted x 1 week  Notes she woke up one day with "nerve pain" from R LB, into  R buttocks into R anterior thigh and to the knee; noted n/t as well in R thigh and in R lower back  Pain/sx have "never been this severe" per pt  F/u with Primary care MD; given steroids and flexeril  Referred to Dr Eufemia Escalante; MRI performed which was + for disc protrusion and multilevel spondylosis of L/S; given Neurontin and referred to PT and pain management  Returns to Primary care MD in August; awaiting appointment with pain management  Notes present cont pain R lower back with pain into R lateral and anterior thigh to her knee  Pain is "shooting at times" which occurs at random per pt; notes "nothing makes it worse" but "standing and walking" and laying "flat" make it better  Notes n/t in R thigh persists  Denies weakness or instability  Notes no present changes in bowel/bladder function or saddle anesthesia at this time  Reports sx are gradually improving  Hopes to RTW next week    Recurrent probem    Quality of life: fair    Pain  Current pain ratin  At best pain ratin  At worst pain ratin  Location: see above   Quality: sharp, radiating and throbbing ("shooting, electrical" )  Progression: improved    Social Support  Steps to enter house: yes  Stairs in house: yes   Lives in: multiple-level home  Lives with: spouse    Employment status: working (full time home care nurse )  Hand dominance: left  Exercise history: Used to enjoy working out and kayaking       Diagnostic Tests  MRI studies: abnormal  Treatments  Previous treatment: medication  Current treatment: medication and physical therapy  Patient Goals  Patient goals for therapy: decreased pain, increased motion, increased strength, return to sport/leisure activities and return to work          Objective     Special Questions  Positive for disturbed sleep  Negative for night pain, bladder dysfunction, bowel dysfunction, saddle (S4) numbness, history of cancer and history of trauma    Postural Observations  Seated posture: fair  Standing posture: fair  Correction of posture: has no consistent effect    Additional Postural Observation Details  Forward head/rounded shoulders  Increased thoracic kyphosis   B scapular depression and protraction with mild winging   Increased lumbar lordosis in standing   Will cont to assess     Palpation     Additional Palpation Details  No ttp noted in L/S musculature  TTP noted over lateral R hip, proximal to greater trochanter  Will cont to assess    Tenderness     Right Hip   Tenderness in the PSIS       Additional Tenderness Details  Elevated ttp proximal to R SIJ region   No other bony ttp noted this date L/S  Will monitor     Neurological Testing     Sensation     Lumbar   Left   Intact: light touch    Right   Diminished: light touch    Reflexes   Left   Patellar (L4): normal (2+)  Achilles (S1): normal (2+)  Clonus sign: negative    Right   Patellar (L4): normal (2+)  Achilles (S1): normal (2+)  Clonus sign: negative    Additional Neurological Details  Diminished light touch sensation R proximal lateral thigh into lateral anterior thigh just above knee   No other changes in sensation noted R LE  Will monitor     Difficulty eliciting achilles reflexes B   Pt notes she "thinks" she may have peripheral neuropathy 2* chronic hx of n/t in B feet   Will cont to assess     Active Range of Motion     Lumbar   Flexion: 35 degrees with pain  Extension: 15 degrees with pain  Left lateral flexion: WFL and with pain  Right lateral flexion: WFL and with pain  Left rotation: WFL  Right rotation: [facility] and with pain    Additional Active Range of Motion Details  Pain in R L/S region with forward flexion, increasing at end range  Pain in R L/S and discomfort R anterior hip with end range extension standing; 1 noted "shocking pain" into R LE to knee   Increased pain R L/S with R lateral flexion > L   Mild pain R L/S with R rotation at end range; no sx to L     Repeated L/S flexion in standing: Reduced back pain, no change overall in R LE sx per pt  Repeated L/S extension in standing: Initial increased back pain, reduced with repeated motion; cont sx in proximal R LE per pt however feels overall sx were improved  Repeated R lateral flexion: Increased LB and R LE pain  Repeated  L lateral flexion: no change in sx         Passive Range of Motion     Additional Passive Range of Motion Details  Slightly reduced PA mobility L3-5 with increased mm guarding noted; intermittent "shocking pains" into R LE with PA mob; improved overall sx with repeated PA mobilizations (grade 3-4) in prone  Will cont to assess     Strength/Myotome Testing     Left Hip   Planes of Motion   Flexion: 4+  Abduction: 4+  Adduction: 4+    Right Hip   Planes of Motion   Flexion: 4- (Pain R L/S )  Abduction: 4+  Adduction: 4+    Left Knee   Flexion: 4+  Extension: 4+    Right Knee   Flexion: 4 (Pain R L/S )  Extension: 4+    Left Ankle/Foot   Dorsiflexion: 4  Plantar flexion: 4+    Right Ankle/Foot   Dorsiflexion: 4  Plantar flexion: 4+    Additional Strength Details  Pain proximal R L/S with R hip flexion as noted; mild pain with resisted R knee flexion; mild weakness noted with pain   Otherwise strength is grossly strong and symmetrical   Will monitor     Muscle Activation     Additional Muscle Activation Details  TBA     Tests       Thoracic   Positive slump  Lumbar   Positive repeated extension, prone instability  and slumped  Left   Negative crossed SLR and passive SLR  Right   Negative crossed SLR and passive SLR  Additional Tests Details  - Supine to sit test; R LE slightly shorter than L in supine /long sit   - Piriformis test B ; "good stretch" only     SLR 90*+ on L, 90* on R; no sx ; "good stretch" only     Reduced back pain with repeated prone press ups        Flowsheet Rows      Most Recent Value   PT/OT G-Codes   Current Score  47   Projected Score  66   FOTO information reviewed  Yes   Assessment Type  Evaluation   G code set  Other PT/OT Primary   Other PT Primary Current Status ()  CK   Other PT Primary Goal Status ()  CJ          Precautions: Hx Depression, HTN    Re-eval Date: 9/5/18    Date 7/25       Visit Count 1       FOTO 7/25       Pain In 2/10       Pain Out 1/10            Daily Treatment Diary     Manual  7/25        10'       Prone PA mobs L/S grades 3-4 to tolerance   Consider grad 5 mob L/S upcoming/prn    Exercise Diary  7/25       Elliptical/ TM warm up         HS stretch Reviewed        Piriformis stretch reviewed       Hip flexor/quad stretch NV       Repeated L/S ext standing 10x       ISIDORO 3'       PPU 15-20x        TA activation supine        TA with alt UE/LE raises supine        Dead bugs supine--> quadruped         Modified planks        Side planks         Tband anti trunk rotation with tband        TA with SLRs         Bridges with hip abd/add        Clamshells         Lifting/body mechanics training                                    Modalities  7/25        prn                           7/25 - HEP was issued and reviewed this date for above noted exercises   Pt demonstrated understanding without incident and without questions/concerns  Will continue to update upcoming  Reviewed activity modification and postural awareness as well this date

## 2018-07-26 ENCOUNTER — TELEPHONE (OUTPATIENT)
Dept: INTERNAL MEDICINE CLINIC | Facility: CLINIC | Age: 43
End: 2018-07-26

## 2018-07-26 NOTE — TELEPHONE ENCOUNTER
Pt called, you gave pt note to return to work on 7-31-18, asking if she can go back on 7-30-18? Ok to do note?

## 2018-07-30 ENCOUNTER — OFFICE VISIT (OUTPATIENT)
Dept: PHYSICAL THERAPY | Facility: CLINIC | Age: 43
End: 2018-07-30
Payer: COMMERCIAL

## 2018-07-30 DIAGNOSIS — M54.41 ACUTE BACK PAIN WITH SCIATICA, RIGHT: Primary | ICD-10-CM

## 2018-07-30 PROCEDURE — 97110 THERAPEUTIC EXERCISES: CPT

## 2018-07-30 NOTE — PROGRESS NOTES
Daily Note     Today's date: 2018  Patient name: Dasha Godoy  : 1975  MRN: [id number]  Referring provider: Chad Obando PA-C  Dx:   Encounter Diagnosis     ICD-10-CM    1  Acute back pain with sciatica, right M54 41        Start Time: 1800  Stop Time: 1900  Total time in clinic (min): 60 minutes  Precautions: Hx Depression, HTN    Re-eval Date: 18    Date       Visit Count 1 2      FOTO        Pain In 2/10 3/10      Pain Out 1/10  4/10        Subjective: "I always have the dull pain in the center of my back  The sensations change down my R leg from numbness, to burning, to throbbing "      Objective: See treatment diary below      Assessment: Tolerated treatment well  Pt initiated ex program this date  Pt noted relief with stretching and extension ex  Slight increased pain at end of session, however pt noted feeling better  Patient demonstrated fatigue post treatment, exhibited good technique with therapeutic exercises and would benefit from continued PT      Plan: Continue per plan of care  Progress treatment as tolerated        Daily Treatment Diary     Manual          10'       Prone PA mobs L/S grades 3-4 to tolerance   Consider grad 5 mob L/S upcoming/prn    Exercise Diary        Elliptical/ TM warm up   TM 10'      HS stretch Reviewed  4x30"      Piriformis stretch reviewed 4x30"      Hip flexor/quad stretch NV 4x30"      Repeated L/S ext standing 10x 15x5"      ISIDORO 3' 3'      PPU 15-20x  15x 5"      TA activation supine  20x5" hold      TA with alt UE/LE raises supine        Dead bugs supine--> quadruped         Modified planks        Side planks         Tband anti trunk rotation with tband        TA with SLRs   2x10 ea      Bridges with hip abd/add  Abd/add only  2x10/5"      Clamshells         Lifting/body mechanics training                                    Modalities          prn                            - HEP was issued and reviewed this date for above noted exercises  Pt demonstrated understanding without incident and without questions/concerns  Will continue to update upcoming  Reviewed activity modification and postural awareness as well this date

## 2018-08-03 ENCOUNTER — OFFICE VISIT (OUTPATIENT)
Dept: PHYSICAL THERAPY | Facility: CLINIC | Age: 43
End: 2018-08-03
Payer: COMMERCIAL

## 2018-08-03 DIAGNOSIS — M54.41 ACUTE BACK PAIN WITH SCIATICA, RIGHT: Primary | ICD-10-CM

## 2018-08-03 PROCEDURE — 97110 THERAPEUTIC EXERCISES: CPT

## 2018-08-03 NOTE — PROGRESS NOTES
Daily Note     Today's date: 8/3/2018  Patient name: Zaida Clarke  : 1975  MRN: [id number]  Referring provider: Katie Del Rio PA-C  Dx:   Encounter Diagnosis     ICD-10-CM    1  Acute back pain with sciatica, right M54 41        Start Time: 0800  Stop Time: 0900  Total time in clinic (min): 60 minutes  Precautions: Hx Depression, HTN    Re-eval Date: 18    Date 7/25 7/30 8/3     Visit Count 1 2 3     FOTO        Pain In 2/10 3/10 3/10     Pain Out 1/10  4/10 2/10       Subjective: "I have a lot of pain in my hip today, nothing in my back "      Objective: See treatment diary below      Assessment: Tolerated treatment well  Pt able to increase repetitions without incident  Pt noted muscle soreness towards end of sets  Patient demonstrated fatigue post treatment, exhibited good technique with therapeutic exercises and would benefit from continued PT      Plan: Continue per plan of care  Progress treatment as tolerated  Daily Treatment Diary     Manual          10'       Prone PA mobs L/S grades 3-4 to tolerance   Consider grad 5 mob L/S upcoming/prn    Exercise Diary   8/3     Elliptical/ TM warm up   TM 10' Time restraint     HS stretch Reviewed  4x30" 4x30"     Piriformis stretch reviewed 4x30" 4x30"     Hip flexor/quad stretch NV 4x30" 4x30"     Repeated L/S ext standing 10x 15x5" 20x/5"     ISIDORO 3' 3' 3'     PPU 15-20x  15x 5" 30x/5"     TA activation supine  20x5" hold 20x5" hold     TA with alt UE/LE raises supine        Dead bugs supine--> quadruped         Modified planks        Side planks         Tband anti trunk rotation with tband        TA with SLRs   2x10 ea 3x10 ea     Bridges with hip abd/add  Abd/add only  2x10/5" Abd/add only  3x10/5" blue     Clamshells         Lifting/body mechanics training                                    Modalities          prn                            - HEP was issued and reviewed this date for above noted exercises   Pt demonstrated understanding without incident and without questions/concerns  Will continue to update upcoming  Reviewed activity modification and postural awareness as well this date

## 2018-08-06 ENCOUNTER — OFFICE VISIT (OUTPATIENT)
Dept: PHYSICAL THERAPY | Facility: CLINIC | Age: 43
End: 2018-08-06
Payer: COMMERCIAL

## 2018-08-06 DIAGNOSIS — M54.41 ACUTE BACK PAIN WITH SCIATICA, RIGHT: Primary | ICD-10-CM

## 2018-08-06 PROCEDURE — 97110 THERAPEUTIC EXERCISES: CPT

## 2018-08-06 NOTE — PROGRESS NOTES
Daily Note     Today's date: 2018  Patient name: Madeleine Joseph  : 1975  MRN: [id number]  Referring provider: Darius Brittle, PA-C  Dx:   Encounter Diagnosis     ICD-10-CM    1  Acute back pain with sciatica, right M54 41        Start Time: 805  Stop Time: 5801  Total time in clinic (min): 60 minutes  Re-eval Date: 18    Date 7/25 7/30 8/3 8/6    Visit Count 1 2 3 4    FOTO        Pain In 2/10 3/10 3/10 3/10    Pain Out 1/10  4/10 2/10       Subjective: "I have more discomfort in my R thigh today  I don't know if I over did  I am feeling burning, numbness, and bruising all over my thigh "      Objective: See treatment diary below      Assessment: Tolerated treatment well  Pt noted bilateral LE cramping while performing SLRs  Resolved with rest   Releif noted with gentle distraction of R LE  Pt noted slight relief after therapy  Patient demonstrated fatigue post treatment, exhibited good technique with therapeutic exercises and would benefit from continued PT      Plan: Continue per plan of care  Progress treatment as tolerated        Manual          10'   5'    Prone PA mobs L/S grades 3-4 to tolerance   Consider grad 5 mob L/S upcoming/prn    Gentle R LE distraction    Exercise Diary  7/25 7/30 8/3     Elliptical/ TM warm up   TM 10' Time restraint TM 10'    HS stretch Reviewed  4x30" 4x30" 4x30"    Piriformis stretch reviewed 4x30" 4x30" 4x30"    Hip flexor/quad stretch NV 4x30" 4x30" 4x30"    Repeated L/S ext standing 10x 15x5" 20x/5" 10/5"    ISIDORO 3' 3' 3' 3'    PPU 15-20x  15x 5" 30x/5" 20x/5"    TA activation supine  20x5" hold 20x5" hold 20x5" hold    TA with alt UE/LE raises supine        Dead bugs supine--> quadruped         Modified planks        Side planks         Tband anti trunk rotation with tband        TA with SLRs   2x10 ea 3x10 ea 3x10 ea    Bridges with hip abd/add  Abd/add only  2x10/5" Abd/add only  3x10/5" blue Abd/add only  3x10/5" blue    Clamshells Lifting/body mechanics training                                    Modalities  7/25        prn                           7/25 - HEP was issued and reviewed this date for above noted exercises  Pt demonstrated understanding without incident and without questions/concerns  Will continue to update upcoming  Reviewed activity modification and postural awareness as well this date

## 2018-08-09 ENCOUNTER — APPOINTMENT (OUTPATIENT)
Dept: PHYSICAL THERAPY | Facility: CLINIC | Age: 43
End: 2018-08-09
Payer: COMMERCIAL

## 2018-08-10 ENCOUNTER — APPOINTMENT (OUTPATIENT)
Dept: PHYSICAL THERAPY | Facility: CLINIC | Age: 43
End: 2018-08-10
Payer: COMMERCIAL

## 2018-08-13 ENCOUNTER — APPOINTMENT (OUTPATIENT)
Dept: PHYSICAL THERAPY | Facility: CLINIC | Age: 43
End: 2018-08-13
Payer: COMMERCIAL

## 2018-08-14 ENCOUNTER — OFFICE VISIT (OUTPATIENT)
Dept: INTERNAL MEDICINE CLINIC | Facility: CLINIC | Age: 43
End: 2018-08-14
Payer: COMMERCIAL

## 2018-08-14 ENCOUNTER — APPOINTMENT (OUTPATIENT)
Dept: PHYSICAL THERAPY | Facility: CLINIC | Age: 43
End: 2018-08-14
Payer: COMMERCIAL

## 2018-08-14 VITALS
DIASTOLIC BLOOD PRESSURE: 90 MMHG | RESPIRATION RATE: 16 BRPM | WEIGHT: 217.6 LBS | BODY MASS INDEX: 38.55 KG/M2 | HEART RATE: 81 BPM | TEMPERATURE: 98.4 F | HEIGHT: 63 IN | OXYGEN SATURATION: 98 % | SYSTOLIC BLOOD PRESSURE: 144 MMHG

## 2018-08-14 DIAGNOSIS — Z13.1 SCREENING FOR DIABETES MELLITUS: ICD-10-CM

## 2018-08-14 DIAGNOSIS — E55.9 VITAMIN D DEFICIENCY: ICD-10-CM

## 2018-08-14 DIAGNOSIS — Z12.31 ENCOUNTER FOR SCREENING MAMMOGRAM FOR MALIGNANT NEOPLASM OF BREAST: ICD-10-CM

## 2018-08-14 DIAGNOSIS — Z13.0 SCREENING, ANEMIA, DEFICIENCY, IRON: ICD-10-CM

## 2018-08-14 DIAGNOSIS — F41.9 ANXIETY AND DEPRESSION: ICD-10-CM

## 2018-08-14 DIAGNOSIS — F32.A ANXIETY AND DEPRESSION: ICD-10-CM

## 2018-08-14 DIAGNOSIS — Z13.220 SCREENING FOR HYPERLIPIDEMIA: ICD-10-CM

## 2018-08-14 DIAGNOSIS — M54.31 RIGHT SIDED SCIATICA: ICD-10-CM

## 2018-08-14 DIAGNOSIS — L30.4 INTERTRIGO: ICD-10-CM

## 2018-08-14 DIAGNOSIS — M54.41 ACUTE RIGHT-SIDED LOW BACK PAIN WITH RIGHT-SIDED SCIATICA: Primary | ICD-10-CM

## 2018-08-14 PROCEDURE — 99214 OFFICE O/P EST MOD 30 MIN: CPT | Performed by: PHYSICIAN ASSISTANT

## 2018-08-14 RX ORDER — NYSTATIN 100000 [USP'U]/G
POWDER TOPICAL 3 TIMES DAILY
Qty: 30 G | Refills: 0 | Status: SHIPPED | OUTPATIENT
Start: 2018-08-14 | End: 2019-03-13 | Stop reason: ALTCHOICE

## 2018-08-14 RX ORDER — IBUPROFEN 200 MG
TABLET ORAL EVERY 6 HOURS PRN
COMMUNITY
End: 2018-08-14

## 2018-08-14 RX ORDER — MELOXICAM 15 MG/1
15 TABLET ORAL DAILY
Qty: 30 TABLET | Refills: 1 | Status: SHIPPED | OUTPATIENT
Start: 2018-08-14 | End: 2019-03-13 | Stop reason: ALTCHOICE

## 2018-08-14 NOTE — ASSESSMENT & PLAN NOTE
Continue current regime  Improve compliance with gabapentin  Try once daily in the evening then graduate to BID dosing  Continue PT  Try mobic as well in place of the OTC motrin to decrease pill burden and improve convenience

## 2018-08-14 NOTE — PROGRESS NOTES
Assessment/Plan:    Low back pain  Continue current regime  Improve compliance with gabapentin  Try once daily in the evening then graduate to BID dosing  Continue PT  Intertrigo  Nystatin powder ordered  Diagnoses and all orders for this visit:    Encounter for screening mammogram for malignant neoplasm of breast  -     Mammo screening bilateral w 3d & cad; Future    Screening, anemia, deficiency, iron  -     CBC and differential    Screening for diabetes mellitus  -     Comprehensive metabolic panel    Screening for hyperlipidemia  -     Lipid Panel with Direct LDL reflex    Anxiety and depression  -     TSH, 3rd generation with T4 reflex    Vitamin D deficiency  -     Vitamin D 25 hydroxy    Acute right-sided low back pain with right-sided sciatica  -     meloxicam (MOBIC) 15 mg tablet; Take 1 tablet (15 mg total) by mouth daily    Intertrigo  -     nystatin (MYCOSTATIN) powder; Apply topically 3 (three) times a day    Right sided sciatica    Other orders  -     ibuprofen (MOTRIN) 200 mg tablet; Take by mouth every 6 (six) hours as needed for mild pain          Subjective:      Patient ID: Estephanie Sparrow is a 41 y o  female  Pt presents for follow up on her right sided low back pain with radicular symptoms  She was seen by ortho who noted no disc herniation and felt her symptoms were primarily due to nerve irritation and muscle train from twisting injury  She is having improvement but not resolution of her symptoms  She continues with PT twice per week  She is back to work  She has not been taking the gabapentin routinely as it made her tired initially  She is willing to try it again as she continues with some numbness in her thigh  She also complains of an itchy, red rash along her groin and near her abdomen and desires an antifungal to help with this           The following portions of the patient's history were reviewed and updated as appropriate:   She  has a past medical history of Anxiety; Candidiasis of mouth; Depression; Hypertension; Obesity; Sciatica; Skin rash; and Urticaria  She   Patient Active Problem List    Diagnosis Date Noted    Intertrigo 08/14/2018    Right sided sciatica 07/09/2018    Low back pain 12/21/2017    Recurrent major depressive episodes, moderate ([facility] [address] ) 04/15/2013    Hyperlipidemia 11/13/2012    Hypertension 11/13/2012     She  has a past surgical history that includes Dental surgery  Her family history includes Cancer in her family and father; Depression in her mother; Diabetes in her family; Heart disease in her family; Hypertension in her father; Osteoporosis in her mother; Stroke in her family; Testicular cancer in her father  She  reports that she has never smoked  She has never used smokeless tobacco  She reports that she drinks about 1 8 oz of alcohol per week   She reports that she does not use drugs  Current Outpatient Prescriptions   Medication Sig Dispense Refill    ALPRAZolam (XANAX) 0 5 mg tablet Take 1 tablet by mouth 3 (three) times a day as needed      citalopram (CeleXA) 40 mg tablet take 1 tablet by mouth once daily 30 tablet 0    cyclobenzaprine (FLEXERIL) 10 mg tablet Take 1 tablet (10 mg total) by mouth 3 (three) times a day as needed for muscle spasms 30 tablet 0    ibuprofen (MOTRIN) 200 mg tablet Take by mouth every 6 (six) hours as needed for mild pain      gabapentin (NEURONTIN) 300 mg capsule Take 1 capsule (300 mg total) by mouth 2 (two) times a day for 30 days 60 capsule 0    meloxicam (MOBIC) 15 mg tablet Take 1 tablet (15 mg total) by mouth daily 30 tablet 1    nystatin (MYCOSTATIN) powder Apply topically 3 (three) times a day 30 g 0     No current facility-administered medications for this visit        Current Outpatient Prescriptions on File Prior to Visit   Medication Sig    ALPRAZolam (XANAX) 0 5 mg tablet Take 1 tablet by mouth 3 (three) times a day as needed    citalopram (CeleXA) 40 mg tablet take 1 tablet by mouth once daily    cyclobenzaprine (FLEXERIL) 10 mg tablet Take 1 tablet (10 mg total) by mouth 3 (three) times a day as needed for muscle spasms    gabapentin (NEURONTIN) 300 mg capsule Take 1 capsule (300 mg total) by mouth 2 (two) times a day for 30 days    [DISCONTINUED] Methylprednisolone 4 MG TBPK Use as directed on package     No current facility-administered medications on file prior to visit  She is allergic to pollen extract       Review of Systems   Constitutional: Negative for chills and fever  HENT: Negative for congestion, ear pain, hearing loss, postnasal drip, rhinorrhea, sinus pain, sinus pressure, sore throat and trouble swallowing  Eyes: Negative for pain and visual disturbance  Respiratory: Negative for cough, chest tightness, shortness of breath and wheezing  Cardiovascular: Negative  Negative for chest pain, palpitations and leg swelling  Gastrointestinal: Negative for abdominal pain, blood in stool, constipation, diarrhea, nausea and vomiting  Endocrine: Negative for cold intolerance, heat intolerance, polydipsia, polyphagia and polyuria  Genitourinary: Negative for difficulty urinating, dysuria, flank pain and urgency  Musculoskeletal: Positive for back pain  Negative for arthralgias, gait problem and myalgias  Skin: Positive for rash  Allergic/Immunologic: Negative  Neurological: Positive for numbness  Negative for dizziness, weakness, light-headedness and headaches  Hematological: Negative  Psychiatric/Behavioral: Negative for behavioral problems, dysphoric mood and sleep disturbance  The patient is not nervous/anxious  Objective:      /90 (BP Location: Left arm, Patient Position: Sitting, Cuff Size: Large)   Pulse 81   Temp 98 4 °F (36 9 °C)   Resp 16   Ht 5' 3" (1 6 m)   Wt 98 7 kg (217 lb 9 6 oz)   SpO2 98%   BMI 38 55 kg/m²          Physical Exam   Constitutional: She is oriented to person, place, and time   She appears well-developed and well-nourished  No distress  HENT:   Head: Normocephalic and atraumatic  Right Ear: External ear normal    Left Ear: External ear normal    Nose: Nose normal    Mouth/Throat: Oropharynx is clear and moist  No oropharyngeal exudate  Eyes: Conjunctivae and EOM are normal  Pupils are equal, round, and reactive to light  Right eye exhibits no discharge  Left eye exhibits no discharge  No scleral icterus  Neck: Normal range of motion  Neck supple  No thyromegaly present  Cardiovascular: Normal rate, regular rhythm and normal heart sounds  Exam reveals no gallop and no friction rub  No murmur heard  Pulmonary/Chest: Effort normal and breath sounds normal  No respiratory distress  She has no wheezes  She has no rales  Abdominal: Soft  Bowel sounds are normal  She exhibits no distension  There is no tenderness  Musculoskeletal: Normal range of motion  She exhibits no edema, tenderness or deformity  Lumbar back: She exhibits pain and spasm  Neurological: She is alert and oriented to person, place, and time  No cranial nerve deficit  Skin: Skin is warm and dry  She is not diaphoretic  Psychiatric: She has a normal mood and affect   Her behavior is normal  Judgment and thought content normal

## 2018-08-20 ENCOUNTER — APPOINTMENT (OUTPATIENT)
Dept: PHYSICAL THERAPY | Facility: CLINIC | Age: 43
End: 2018-08-20
Payer: COMMERCIAL

## 2018-08-23 ENCOUNTER — APPOINTMENT (OUTPATIENT)
Dept: PHYSICAL THERAPY | Facility: CLINIC | Age: 43
End: 2018-08-23
Payer: COMMERCIAL

## 2018-08-23 NOTE — PROGRESS NOTES
Ron Adelaida will be discharged from outpatient physical therapy care due to expiration of plan of care  Last attended tx session was on 8/6 ;did not return to therapy after this date  No objective measures updated, Ron Son is not present at time of discharge

## 2018-08-24 ENCOUNTER — APPOINTMENT (OUTPATIENT)
Dept: PHYSICAL THERAPY | Facility: CLINIC | Age: 43
End: 2018-08-24
Payer: COMMERCIAL

## 2018-08-27 ENCOUNTER — CONSULT (OUTPATIENT)
Dept: PAIN MEDICINE | Facility: CLINIC | Age: 43
End: 2018-08-27
Payer: COMMERCIAL

## 2018-08-27 VITALS — BODY MASS INDEX: 39.08 KG/M2 | WEIGHT: 220.6 LBS

## 2018-08-27 DIAGNOSIS — M54.31 RIGHT SIDED SCIATICA: ICD-10-CM

## 2018-08-27 DIAGNOSIS — M54.41 ACUTE RIGHT-SIDED LOW BACK PAIN WITH RIGHT-SIDED SCIATICA: Primary | ICD-10-CM

## 2018-08-27 DIAGNOSIS — G89.4 CHRONIC PAIN SYNDROME: ICD-10-CM

## 2018-08-27 DIAGNOSIS — M79.18 MYOFASCIAL PAIN SYNDROME: ICD-10-CM

## 2018-08-27 PROCEDURE — 99204 OFFICE O/P NEW MOD 45 MIN: CPT | Performed by: ANESTHESIOLOGY

## 2018-08-27 NOTE — PROGRESS NOTES
Assessment:  1  Acute right-sided low back pain with right-sided sciatica    2  Right sided sciatica    3  Myofascial pain syndrome    4  Chronic pain syndrome        Plan:  Patient is a 53-year-old female with complaints of low back pain with radiculopathy into the right lower extremity in the L3 and L4 nerve root distribution clinically presents to office for initial consultation  Patient reports having the symptoms for approximately several months elicited by standing out of her [place]  MRI of lumbar spine was reviewed and shows multilevel degenerative disc changes with a small broad right foraminal disc protrusion a persistent annular which correlates with the right L3 nerve root encroachment  L5-S1 mild degenerative facet hypertrophy  1  We will schedule patient for physical therapy with lumbar core strengthening and Tamiko based therapy to help with her low back core weakness and radicular symptoms that right lower extremity   2  Patient was prescribed Neurontin and Mobic but at this time she reports that the acute exacerbation of her radicular symptoms are starting to subside  We reviewed the side effect of these medication the   3  We will probably consider a right L3-L4 transforaminal epidural steroid injection in future patient does exhibit some similar radicular symptoms or cannot tolerate physical therapy      [place] Prescription Drug Monitoring Program report was reviewed and was appropriate     Complete risks and benefits including bleeding, infection, tissue reaction, nerve injury and allergic reaction were discussed  The approach was demonstrated using models and literature was provided  Verbal and written consent was obtained  History of Present Illness: The patient is a 41 y o  female who presents for consultation in regards to Back Pain  Symptoms have been present for several months  Symptoms began following a non work related injury   Pain is reported to be 2 on the numeric rating scale  Symptoms are felt occasionally and worst in the no typical pattern  Symptoms are characterized as shooting, sharp, dull/aching, numbing, pressure-like and throbbing  Symptoms are associated with no weakness  Aggravating factors include sitting  Relieving factors include lying down, walking and exercise  No change in symptoms with coughing/sneezing and bowel movements  Treatments that have been helpful include nothing  physical therapy have provided no relief  Medications to relieve symptoms include ibuprofen  Review of Systems:    Review of Systems   Musculoskeletal: Positive for back pain  All other systems reviewed and are negative  Past Medical History:   Diagnosis Date    Anxiety     Candidiasis of mouth     Last Assessed [id number]    Depression     Hypertension     Obesity     Sciatica     Last Assessed 04Aug2014    Skin rash     Last Assessed [id number]    Urticaria     Last Assessed [id number]       Past Surgical History:   Procedure Laterality Date    DENTAL SURGERY         Family History   Problem Relation Age of Onset    Depression Mother     Osteoporosis Mother     Testicular cancer Father     Cancer Father     Hypertension Father     Cancer Family     Diabetes Family     Heart disease Family     Stroke Family         Stroke syndrome       Social History     Occupational History    Not on file       Social History Main Topics    Smoking status: Never Smoker    Smokeless tobacco: Never Used    Alcohol use 1 8 oz/week     3 Glasses of wine per week      Comment: Social    Drug use: No    Sexual activity: Yes         Current Outpatient Prescriptions:     ALPRAZolam (XANAX) 0 5 mg tablet, Take 1 tablet by mouth 3 (three) times a day as needed, Disp: , Rfl:     citalopram (CeleXA) 40 mg tablet, take 1 tablet by mouth once daily, Disp: 30 tablet, Rfl: 0    cyclobenzaprine (FLEXERIL) 10 mg tablet, Take 1 tablet (10 mg total) by mouth 3 (three) times a day as needed for muscle spasms, Disp: 30 tablet, Rfl: 0    gabapentin (NEURONTIN) 300 mg capsule, Take 1 capsule (300 mg total) by mouth 2 (two) times a day for 30 days, Disp: 60 capsule, Rfl: 0    meloxicam (MOBIC) 15 mg tablet, Take 1 tablet (15 mg total) by mouth daily, Disp: 30 tablet, Rfl: 1    nystatin (MYCOSTATIN) powder, Apply topically 3 (three) times a day, Disp: 30 g, Rfl: 0    Allergies   Allergen Reactions    Pollen Extract Allergic Rhinitis       Physical Exam:    Wt 100 kg (220 lb 9 6 oz)   BMI 39 08 kg/m²     Constitutional: normal, well developed, well nourished, alert, in no distress and non-toxic and no overt pain behavior  and overweight  Eyes: anicteric  HEENT: grossly intact  Neck: supple, symmetric, trachea midline and no masses   Pulmonary:even and unlabored  Cardiovascular:No edema or pitting edema present  Skin:Normal without rashes or lesions and well hydrated  Psychiatric:Mood and affect appropriate  Neurologic:Cranial Nerves II-XII grossly intact  Musculoskeletal:antalgic     Lumbar/Sacral Spine examination demonstrates  Decreased range of motion lumbar spine with pain upon: flexion, lateral rotation to the left/right, and bending to the left/right  Bilateral lumbar paraspinals tender to palpation  Muscle spasms noted in the lumbar area bilaterally  4/5 right lower extremity strength in all muscle groups  Positive seated straight leg raise for right lower extremity  Sensitivity to light touch intact bilateral lower extremities with reduce sensation to pinprick in the right L3 and L4 nerve root distribution  2+ reflexes in the patella and Achilles  No ankle clonus    Imaging  No orders to display       No orders of the defined types were placed in this encounter

## 2018-09-05 ENCOUNTER — APPOINTMENT (OUTPATIENT)
Dept: PHYSICAL THERAPY | Facility: CLINIC | Age: 43
End: 2018-09-05
Payer: COMMERCIAL

## 2018-09-05 DIAGNOSIS — F33.41 RECURRENT MAJOR DEPRESSIVE DISORDER, IN PARTIAL REMISSION (HCC): ICD-10-CM

## 2018-09-05 RX ORDER — CITALOPRAM 40 MG/1
40 TABLET ORAL DAILY
Qty: 30 TABLET | Refills: 0 | Status: SHIPPED | OUTPATIENT
Start: 2018-09-05 | End: 2018-12-11 | Stop reason: SDUPTHER

## 2018-09-10 ENCOUNTER — EVALUATION (OUTPATIENT)
Dept: PHYSICAL THERAPY | Facility: CLINIC | Age: 43
End: 2018-09-10
Payer: COMMERCIAL

## 2018-09-10 DIAGNOSIS — M54.41 ACUTE BACK PAIN WITH SCIATICA, RIGHT: Primary | ICD-10-CM

## 2018-09-10 DIAGNOSIS — M54.31 SCIATICA OF RIGHT SIDE: ICD-10-CM

## 2018-09-10 DIAGNOSIS — G89.4 CHRONIC PAIN SYNDROME: ICD-10-CM

## 2018-09-10 PROCEDURE — 97161 PT EVAL LOW COMPLEX 20 MIN: CPT | Performed by: PHYSICAL MEDICINE & REHABILITATION

## 2018-09-10 PROCEDURE — G8991 OTHER PT/OT GOAL STATUS: HCPCS | Performed by: PHYSICAL MEDICINE & REHABILITATION

## 2018-09-10 PROCEDURE — 97535 SELF CARE MNGMENT TRAINING: CPT | Performed by: PHYSICAL MEDICINE & REHABILITATION

## 2018-09-10 PROCEDURE — 97110 THERAPEUTIC EXERCISES: CPT | Performed by: PHYSICAL MEDICINE & REHABILITATION

## 2018-09-10 PROCEDURE — G8990 OTHER PT/OT CURRENT STATUS: HCPCS | Performed by: PHYSICAL MEDICINE & REHABILITATION

## 2018-09-10 NOTE — PROGRESS NOTES
PT Evaluation     Today's date: 9/10/2018  Patient name: Geoff Herndon  : 1975  MRN: [id number]  Referring provider: Zaida Escamilla MD  Dx:   Encounter Diagnosis     ICD-10-CM    1  Acute back pain with sciatica, right M54 41    2  Sciatica of right side M54 31    3  Chronic pain syndrome G89 4                   Assessment  Impairments: abnormal coordination, abnormal muscle firing, abnormal muscle tone, abnormal or restricted ROM, abnormal movement, activity intolerance, impaired physical strength, lacks appropriate home exercise program, pain with function and poor posture     Assessment details: Geoff Herndon is a pleasant 41 y o  female who is returning to OPPT with c/c of exacerbation of R L/S and R LE radicular pain  She demonstrates restricted end range AROM L/S with pain, reduced sensation over R thigh, weakness R proximal hip, and intermittent radicular pain in R hip/thigh  Pertinent PT findings today are hx of repeated episodes of LBP, SLR >90*, + Gowers sign with AROM, and + prone instability test L/S  The patient's greatest concerns are the pain she is experiencing, worry over not knowing what's wrong and fear of not being able to keep active  No further referral appears necessary at this time based upon examination results  Primary movement impairment diagnosis of poor lumbopelvic movement coordination resulting in pathoanatomical symptoms of Acute back pain with sciatica, right  (primary encounter diagnosis) and limiting her ability to perform exercise or recreation, lift, perform household chores, perform yard work, squat to  objects from the floor, stand and walk  Feels her sx are gradually improving over time     Impairments include:  1) poor lumbopelvic movement coordination - addressing with neuromotor retraining   2) poor lifting mechanics - addressing with functional retraining  3) transfer intolerance - addressing with functional retraining   4) Reduced proximal hip and core strength- addressing with therapeutic exercise training  5) Increase in sx with prolonged sitting/reduced sx with extension- addressing with therapeutic exercise and manual therapy treatments   Etiologic factors include recent heavy lifting, prolonged periods of sitting  Understanding of Dx/Px/POC: good   Prognosis: good  Prognosis details: Positive prognostic indicators include positive attitude toward recovery, good understanding of diagnosis and treatment plan options, acuity of symptoms and absence of observed red flags  Negative prognostic indicators include anxiety, depression, hypertension and obesity  Goals  STGs to be achoeved in 4-6 weeks:  Patient will demonstrate increased L/S flexion AROM grossly by at least 50% from [facility] without increase in sx  Patient will demonstrate increased proximal hip strength by at least 1/2 MMT grossly without pain/sx  Patient will demonstrate I with proper TA/core mm activation with exercise training with 100% consistency without need for cues  Patient will note no sx and no change in sensation below R L/S proximally for the past week or more  LTGs- To be achieved in 8-12 weeks:  Patient will be independent with home exercise program    Patient will be able to manage symptoms independently  Patient will be able to roll in bed without limitation due to pain  Patient will be able to get in/out of her car without limitation due to pain  Patient will be able to get in/out of bed without limitation due to pain  Patient will be able to squat to  objects from the floor without limitation due to pain  Patient will be able to lift without limitation due to pain      Plan  Patient would benefit from: skilled PT  Referral necessary: No  Planned modality interventions: thermotherapy: hydrocollator packs and cryotherapy  Planned therapy interventions: abdominal trunk stabilization, activity modification, joint mobilization, manual therapy, motor coordination training, neuromuscular re-education, patient education, self care, therapeutic activities, therapeutic exercise, home exercise program, behavior modification, postural training, transfer training and graded activity  Frequency: 2x week (1-2x/week)  Duration in visits: 12  Duration in weeks: 6  Treatment plan discussed with: patient  Plan details: Prognosis above is given PT services 2x/week tapering to 1x/week over the next 2 months and home program adherence  Subjective Evaluation    History of Present Illness  Mechanism of injury: Pt notes hx of R LBP and sciatica since 2015  Notes no specific traumatic event however notes she has frequently "thrown her back out" with activity since  Notes sx "went away on their own" with ibuprofen after about a week per pt  Notes present sx began early July 2018; notes she was attempting to get out of a kayak with onset of mm pain in R LB; persisted x 1 week  Notes she woke up one day with "nerve pain" from R LB, into  R buttocks into R anterior thigh and to the knee; noted n/t as well in R thigh and in R lower back  Pain/sx have "never been this severe" per pt  F/u with Primary care MD; given steroids and flexeril  Referred to Dr Ephraim Murrell; MRI performed which was + for disc protrusion and multilevel spondylosis of L/S; given Neurontin and referred to PT and pain management  Underwent OPPT x approx 3 weeks- one month  Notes the "sciaticia almost completely resolved" and her LBP "returned to normal/usual"  Pt self d/c 2* schedule conflicts with work  F/u with pain management about 2-3 weeks ago ; recommended returning to [facility]  RTD prn per pt  Pt notes present pain is intermittent  Notes her sx overall continue to improve gradually  Pain continues to be located in R lower back with intermittent pain into lateral R hip only  Notes her R hip pain feels unrelated to LBP at this point  Notes her R thigh is "still numb" constantly  No longer has pain into RLE  Present sx are increased with sitting, especially sciatic pain/sx  LBP is increased with excessive activity  Sx are reduced with laying down  No new sx/complaints  Denies any red flag sx at this time  Notes she would like to keep PT to 1x/week 2* her "hectic work schedule"  Recurrent probem    Quality of life: fair    Pain  Current pain ratin  At best pain ratin  At worst pain ratin  Location: R lateral hip, R L/S   Quality: dull ache  Progression: improved    Social Support  Steps to enter house: yes  Stairs in house: yes   Lives in: multiple-level home  Lives with: spouse    Employment status: working (full time home care nurse )  Hand dominance: left  Exercise history: Used to enjoy working out and kayaking       Diagnostic Tests  MRI studies: abnormal  Treatments  Previous treatment: medication and physical therapy  Current treatment: physical therapy  Patient Goals  Patient goals for therapy: decreased pain, increased motion, increased strength, return to sport/leisure activities and return to work          Objective     Special Questions  Negative for night pain, disturbed sleep, bladder dysfunction, bowel dysfunction, saddle (S4) numbness, history of cancer and history of trauma    Postural Observations  Seated posture: fair  Standing posture: fair  Correction of posture: has no consistent effect    Additional Postural Observation Details  Forward head/rounded shoulders  Increased thoracic kyphosis   B scapular depression and protraction with mild winging   Increased lumbar lordosis in standing   Overall slouched/flexed posture noted in sitting   Will cont to assess     Palpation     Additional Palpation Details  TTP noted R distal L/S PVMs and R proximal SIJ region   TTP noted R piriformis mm   Will cont to assess    Tenderness     Right Hip   Tenderness in the PSIS       Additional Tenderness Details  Elevated ttp proximal to R SIJ region   No other bony ttp noted this date L/S  Will monitor Neurological Testing     Sensation     Lumbar   Left   Intact: light touch    Right   Diminished: light touch    Reflexes   Left   Patellar (L4): normal (2+)  Achilles (S1): normal (2+)  Clonus sign: negative    Right   Patellar (L4): normal (2+)  Achilles (S1): normal (2+)  Clonus sign: negative    Additional Neurological Details  Diminished light touch sensation R proximal lateral thigh into lateral anterior thigh just above knee   No other changes in sensation noted R LE  Will monitor     Difficulty eliciting achilles reflexes B- continues however is symmetrical   Will cont to assess     Active Range of Motion     Lumbar   Flexion: 60 degrees   Extension: 10 degrees with pain  Left lateral flexion: Active left lumbar lateral flexion: 75% WFL and with pain  Right lateral flexion: WFL and with pain  Left rotation: WFL  Right rotation: [facility] and with pain    Additional Active Range of Motion Details  Pulling discomfort in R L/S region with forward flexion at end range  + Gowers sign with returning to stand from forward flexion   Pain in R L/S locally with end range extension standing  Increased pulling/discomfort R L/S with L lateral flexion standing; no sx with R lateral flexion  Mild pain R L/S with R rotation at end range; no sx to L     Repeated L/S flexion in standing: No change in R LBP/discomfort or radicular sx   Repeated L/S extension in standing: No change in LBP or radicular LE sx  Repeated L lateral flexion: Increased tightness R LB; no change in pain/radicular sx  Repeated  R lateral flexion: no change in sx         Passive Range of Motion     Additional Passive Range of Motion Details  Gross PA mobility L/S noted to be WNL/WFL  Tenderness/pain noted locally at L 3-L5 regions with PA mobility assessment  No radicular sx  Will cont to assess     Strength/Myotome Testing     Lumbar   Left   Heel walk: abnormal  Toe walk: normal    Right   Heel walk: abnormal  Toe walk: normal    Left Hip   Planes of Motion   Flexion: 4+  Abduction: 4+  Adduction: 4+    Right Hip   Planes of Motion   Flexion: 4 (Pain R L/S )  Abduction: 4+  Adduction: 4    Left Knee   Flexion: 4+  Extension: 4    Right Knee   Flexion: 4+  Extension: 4+    Left Ankle/Foot   Dorsiflexion: 4  Plantar flexion: 4+    Right Ankle/Foot   Dorsiflexion: 4  Plantar flexion: 4+    Additional Strength Details  Pain proximal R L/S with R hip flexion as noted; mild  Weakness proximal R hip vs L   B ankle DF weakness noted however is symmetrical; pt notes she "always had weak ankles"   Otherwise strength is grossly strong and symmetrical   Will monitor     Muscle Activation     Additional Muscle Activation Details  TBA     Tests       Thoracic   Negative slump  Lumbar   Positive prone instability   Negative repeated extension and slump  Left   Negative crossed SLR and passive SLR  Right   Negative crossed SLR and passive SLR       Additional Tests Details  - Supine to sit test; R LE slightly shorter than L in supine /long sit   - Piriformis test B ; "good stretch" only     SLR 90*+ on L, 85-90* on R; no sx ; "good stretch" only     Will cont to assess         Flowsheet Rows      Most Recent Value   PT/OT G-Codes   Current Score  72   Projected Score  78   Assessment Type  Evaluation   G code set  Other PT/OT Primary   Other PT Primary Current Status ()  CJ   Other PT Primary Goal Status ()  CJ          Precautions: Hx Depression, HTN    Re-eval Date:     Date 9/10       Visit Count 1       FOTO 9/10       Pain In 1/10       Pain Out 1/10           Daily Treatment Diary     Manual  9/10                   Exercise Diary  9/10       Elliptical/ TM warm up         HS stretch 2x30"       Piriformis stretch 2x30"       Hip flexor/quad stretch NV       Repeated L/S ext standing        ISIDORO        PPU Reviewed HEP       TA activation supine 5x supine   Cues        TA with alt UE/LE raises supine        Dead bugs supine--> quadruped Modified planks        Side planks         Tband anti trunk rotation with tband        TA with SLRs         Bridges with hip abd/add        Clamshells         Lifting/body mechanics training        TA with heel slides supine 5x ea cues        TA with LE extensions 5x ea cues                    Modalities  9/10        prn                           9/10 - HEP was issued and reviewed this date for above noted exercises  Pt demonstrated understanding without incident and without questions/concerns  Will continue to update upcoming  Reviewed activity modification and postural awareness as well this date

## 2018-09-19 ENCOUNTER — APPOINTMENT (OUTPATIENT)
Dept: PHYSICAL THERAPY | Facility: CLINIC | Age: 43
End: 2018-09-19
Payer: COMMERCIAL

## 2018-09-24 ENCOUNTER — APPOINTMENT (OUTPATIENT)
Dept: PHYSICAL THERAPY | Facility: CLINIC | Age: 43
End: 2018-09-24
Payer: COMMERCIAL

## 2018-10-09 NOTE — PROGRESS NOTES
Paula Frank will be discharged from outpatient physical therapy care due to expiration of plan of care  Last attended tx session was on 9/10 ;did not return to therapy after this date  No objective measures updated, Paula Frank is not present at time of discharge

## 2018-12-11 DIAGNOSIS — F33.41 RECURRENT MAJOR DEPRESSIVE DISORDER, IN PARTIAL REMISSION (HCC): ICD-10-CM

## 2018-12-11 RX ORDER — CITALOPRAM 40 MG/1
40 TABLET ORAL DAILY
Qty: 30 TABLET | Refills: 5 | Status: SHIPPED | OUTPATIENT
Start: 2018-12-11 | End: 2019-11-20 | Stop reason: SDUPTHER

## 2019-03-13 ENCOUNTER — OFFICE VISIT (OUTPATIENT)
Dept: URGENT CARE | Facility: CLINIC | Age: 44
End: 2019-03-13
Payer: COMMERCIAL

## 2019-03-13 VITALS
SYSTOLIC BLOOD PRESSURE: 126 MMHG | DIASTOLIC BLOOD PRESSURE: 88 MMHG | HEIGHT: 63 IN | HEART RATE: 112 BPM | OXYGEN SATURATION: 97 % | RESPIRATION RATE: 18 BRPM | TEMPERATURE: 97.8 F | WEIGHT: 219 LBS | BODY MASS INDEX: 38.8 KG/M2

## 2019-03-13 DIAGNOSIS — J02.9 SORE THROAT: ICD-10-CM

## 2019-03-13 DIAGNOSIS — J06.9 ACUTE URI: Primary | ICD-10-CM

## 2019-03-13 LAB — S PYO AG THROAT QL: NEGATIVE

## 2019-03-13 PROCEDURE — S9088 SERVICES PROVIDED IN URGENT: HCPCS | Performed by: PHYSICIAN ASSISTANT

## 2019-03-13 PROCEDURE — 87070 CULTURE OTHR SPECIMN AEROBIC: CPT | Performed by: PHYSICIAN ASSISTANT

## 2019-03-13 PROCEDURE — 99213 OFFICE O/P EST LOW 20 MIN: CPT | Performed by: PHYSICIAN ASSISTANT

## 2019-03-13 PROCEDURE — 87430 STREP A AG IA: CPT | Performed by: PHYSICIAN ASSISTANT

## 2019-03-13 NOTE — PROGRESS NOTES
[facility]    NAME: Giorgio Hollingsworth is a 41 y o  female  : 1975    MRN: [id number]  DATE: 2019  TIME: 2:22 PM    Assessment and Plan   Acute URI [J06 9]  1  Acute URI     2  Sore throat  POCT rapid strepA    Throat culture       Patient Instructions     Patient Instructions   Infection appears viral   Recommend symptomatic treatment  Can take ibuprofen or tylenol as needed for pain or fever  Over the counter cough and cold medications to help with symptoms  Use salt water gargles for sore throat and throat lozenges  Cough drops as needed  Wash hands frequently to prevent the spread of infection  If not improving over the next 7-10 days, follow up with PCP  Symptoms may persist for 10-14 days  Chief Complaint     Chief Complaint   Patient presents with    Sore Throat     Pt c/o sore throat and sinus pressure x 3 days  History of Present Illness   27-year-old female here with complaint of sore throat, nasal congestion and sinus congestion for the last 3 days  Needs a work note  No fever or chills  Has been very tired and fatigued  Has a slight cough from the postnasal drip  States that she feels itchy  Review of Systems   Review of Systems   Constitutional: Negative for activity change, appetite change, chills, diaphoresis, fatigue, fever and unexpected weight change  HENT: Positive for congestion, postnasal drip, sinus pressure and sore throat  Negative for dental problem, hearing loss, sneezing, tinnitus, trouble swallowing and voice change  Eyes: Negative for photophobia, redness and visual disturbance  Respiratory: Positive for cough  Negative for apnea, chest tightness, shortness of breath, wheezing and stridor  Cardiovascular: Negative for chest pain, palpitations and leg swelling  Gastrointestinal: Negative for abdominal distention, abdominal pain, blood in stool, constipation, diarrhea, nausea and vomiting     Endocrine: Negative for cold intolerance, heat intolerance, polydipsia, polyphagia and polyuria  Genitourinary: Negative for difficulty urinating, dysuria, flank pain, frequency, hematuria and urgency  Musculoskeletal: Negative for arthralgias, back pain, gait problem, joint swelling, myalgias, neck pain and neck stiffness  Skin: Negative for pallor, rash and wound  Neurological: Negative for dizziness, tremors, seizures, speech difficulty, weakness and headaches  Hematological: Negative for adenopathy  Does not bruise/bleed easily  Psychiatric/Behavioral: Negative for agitation, confusion, dysphoric mood and sleep disturbance  The patient is not nervous/anxious  All other systems reviewed and are negative        Current Medications     Current Outpatient Medications:     ALPRAZolam (XANAX) 0 5 mg tablet, Take 1 tablet by mouth 3 (three) times a day as needed, Disp: , Rfl:     citalopram (CeleXA) 40 mg tablet, Take 1 tablet (40 mg total) by mouth daily, Disp: 30 tablet, Rfl: 5    Current Allergies     Allergies as of 03/13/2019 - Reviewed 03/13/2019   Allergen Reaction Noted    Pollen extract Allergic Rhinitis 11/13/2012          The following portions of the patient's history were reviewed and updated as appropriate: allergies, current medications, past family history, past medical history, past social history, past surgical history and problem list    Past Medical History:   Diagnosis Date    Anxiety     Candidiasis of mouth     Last Assessed [id number]    Depression     Hypertension     Obesity     Sciatica     Last Assessed [id number]    Skin rash     Last Assessed [id number]    Urticaria     Last Assessed [id number]     Past Surgical History:   Procedure Laterality Date    DENTAL SURGERY       Family History   Problem Relation Age of Onset    Depression Mother     Osteoporosis Mother     Testicular cancer Father     Cancer Father     Hypertension Father     Cancer Family     Diabetes Family     Heart disease Family     Stroke Family         Stroke syndrome     Social History     Socioeconomic History    Marital status: Single     Spouse name: Not on file    Number of children: Not on file    Years of education: Not on file    Highest education level: Not on file   Occupational History    Not on file   Social Needs    Financial resource strain: Not on file    Food insecurity:     Worry: Not on file     Inability: Not on file    Transportation needs:     Medical: Not on file     Non-medical: Not on file   Tobacco Use    Smoking status: Never Smoker    Smokeless tobacco: Never Used   Substance and Sexual Activity    Alcohol use: Yes     Alcohol/week: 1 8 oz     Types: 3 Glasses of wine per week     Comment: Social    Drug use: No    Sexual activity: Yes   Lifestyle    Physical activity:     Days per week: Not on file     Minutes per session: Not on file    Stress: Not on file   Relationships    Social connections:     Talks on phone: Not on file     Gets together: Not on file     Attends Latter day service: Not on file     Active member of club or organization: Not on file     Attends meetings of clubs or organizations: Not on file     Relationship status: Not on file    Intimate partner violence:     Fear of current or ex partner: Not on file     Emotionally abused: Not on file     Physically abused: Not on file     Forced sexual activity: Not on file   Other Topics Concern    Not on file   Social History Narrative        Employed -  [employer] Full Time    Lives with spouse     Caffeine use    Uses safety equipment: seat belts     Medications have been verified  Objective   /88   Pulse (!) 112   Temp 97 8 °F (36 6 °C)   Resp 18   Ht 5' 3" (1 6 m)   Wt 99 3 kg (219 lb)   SpO2 97%   BMI 38 79 kg/m²      Physical Exam   Physical Exam   Constitutional: She appears well-developed and well-nourished  No distress  HENT:   Head: Normocephalic     Right Ear: Tympanic membrane and external ear normal    Left Ear: Tympanic membrane and external ear normal    Nose: Mucosal edema present  Mouth/Throat: Posterior oropharyngeal erythema present  No oropharyngeal exudate  Neck: Normal range of motion  Neck supple  Cardiovascular: Normal rate, regular rhythm and normal heart sounds  No murmur heard  Pulmonary/Chest: Effort normal and breath sounds normal  No respiratory distress  She has no wheezes  She has no rales  Abdominal: Soft  Bowel sounds are normal  There is no tenderness  Musculoskeletal: Normal range of motion  Lymphadenopathy:     She has no cervical adenopathy  Skin: Skin is warm  No rash noted  Nursing note and vitals reviewed

## 2019-03-13 NOTE — LETTER
March 13, 2019     Patient: Rolando Darling   YOB: 1975   Date of Visit: 3/13/2019       To Whom It May Concern: It is my medical opinion that Windy Samson is able to return to work on 3/14/19  If you have any questions or concerns, please don't hesitate to call           Sincerely,        Courtney Chavis PA-C    CC: Rolando Tipton

## 2019-03-15 LAB — BACTERIA THROAT CULT: NORMAL

## 2019-05-01 ENCOUNTER — OCCMED (OUTPATIENT)
Dept: URGENT CARE | Age: 44
End: 2019-05-01
Payer: OTHER MISCELLANEOUS

## 2019-05-01 DIAGNOSIS — S80.211A ABRASION OF RIGHT KNEE, INITIAL ENCOUNTER: ICD-10-CM

## 2019-05-01 DIAGNOSIS — S80.212A ABRASION OF LEFT KNEE, INITIAL ENCOUNTER: ICD-10-CM

## 2019-05-01 DIAGNOSIS — M25.571 ACUTE RIGHT ANKLE PAIN: Primary | ICD-10-CM

## 2019-05-01 PROCEDURE — G0382 LEV 3 HOSP TYPE B ED VISIT: HCPCS | Performed by: NURSE PRACTITIONER

## 2019-05-01 PROCEDURE — 99283 EMERGENCY DEPT VISIT LOW MDM: CPT | Performed by: NURSE PRACTITIONER

## 2019-10-16 ENCOUNTER — OFFICE VISIT (OUTPATIENT)
Dept: INTERNAL MEDICINE CLINIC | Facility: CLINIC | Age: 44
End: 2019-10-16
Payer: COMMERCIAL

## 2019-10-16 VITALS
WEIGHT: 211.2 LBS | DIASTOLIC BLOOD PRESSURE: 98 MMHG | OXYGEN SATURATION: 98 % | RESPIRATION RATE: 18 BRPM | HEART RATE: 75 BPM | SYSTOLIC BLOOD PRESSURE: 148 MMHG | TEMPERATURE: 98.2 F | BODY MASS INDEX: 37.42 KG/M2 | HEIGHT: 63 IN

## 2019-10-16 DIAGNOSIS — F33.41 RECURRENT MAJOR DEPRESSIVE DISORDER, IN PARTIAL REMISSION (HCC): Primary | ICD-10-CM

## 2019-10-16 DIAGNOSIS — Z13.29 SCREENING FOR THYROID DISORDER: ICD-10-CM

## 2019-10-16 DIAGNOSIS — Z13.0 SCREENING, ANEMIA, DEFICIENCY, IRON: ICD-10-CM

## 2019-10-16 DIAGNOSIS — Z13.1 SCREENING FOR DIABETES MELLITUS: ICD-10-CM

## 2019-10-16 DIAGNOSIS — Z23 NEED FOR INFLUENZA VACCINATION: ICD-10-CM

## 2019-10-16 DIAGNOSIS — Z13.220 SCREENING FOR HYPERLIPIDEMIA: ICD-10-CM

## 2019-10-16 DIAGNOSIS — E55.9 VITAMIN D DEFICIENCY: ICD-10-CM

## 2019-10-16 PROCEDURE — 3008F BODY MASS INDEX DOCD: CPT | Performed by: PHYSICIAN ASSISTANT

## 2019-10-16 PROCEDURE — 99213 OFFICE O/P EST LOW 20 MIN: CPT | Performed by: PHYSICIAN ASSISTANT

## 2019-10-16 PROCEDURE — 90471 IMMUNIZATION ADMIN: CPT | Performed by: PHYSICIAN ASSISTANT

## 2019-10-16 PROCEDURE — 90686 IIV4 VACC NO PRSV 0.5 ML IM: CPT | Performed by: PHYSICIAN ASSISTANT

## 2019-10-16 RX ORDER — IBUPROFEN 200 MG
TABLET ORAL EVERY 6 HOURS PRN
COMMUNITY

## 2019-10-16 RX ORDER — ALPRAZOLAM 0.5 MG/1
0.5 TABLET ORAL 3 TIMES DAILY PRN
Qty: 270 TABLET | Refills: 0 | Status: SHIPPED | OUTPATIENT
Start: 2019-10-16 | End: 2019-11-20 | Stop reason: SDUPTHER

## 2019-10-16 NOTE — ASSESSMENT & PLAN NOTE
Pt wishes to continue with her current regime  Work note provided for 1 week off to allow her to grieve  Routine labs ordered  Recheck 1 month to ensure improvement

## 2019-10-16 NOTE — PROGRESS NOTES
Assessment/Plan:  Problem List Items Addressed This Visit     None      Visit Diagnoses     Recurrent major depressive disorder, in partial remission ([facility] )    -  Primary    Relevant Medications    ALPRAZolam (XANAX) 0 5 mg tablet    Other Relevant Orders    CBC and differential    Screening, anemia, deficiency, iron        Relevant Orders    CBC and differential    Screening for diabetes mellitus        Relevant Orders    Comprehensive metabolic panel    Screening for hyperlipidemia        Relevant Orders    Lipid Panel with Direct LDL reflex    Vitamin D deficiency        Relevant Orders    Vitamin D 25 hydroxy    Screening for thyroid disorder        Relevant Orders    TSH, 3rd generation with Free T4 reflex    Need for influenza vaccination        Relevant Orders    FLUZONE: influenza vaccine, quadrivalent, 0 5 mL (Completed)           Diagnoses and all orders for this visit:    Recurrent major depressive disorder, in partial remission ([facility] )  -     CBC and differential  -     ALPRAZolam (XANAX) 0 5 mg tablet; Take 1 tablet (0 5 mg total) by mouth 3 (three) times a day as needed (as neded)    Screening, anemia, deficiency, iron  -     CBC and differential    Screening for diabetes mellitus  -     Comprehensive metabolic panel    Screening for hyperlipidemia  -     Lipid Panel with Direct LDL reflex    Vitamin D deficiency  -     Vitamin D 25 hydroxy    Screening for thyroid disorder  -     TSH, 3rd generation with Free T4 reflex    Need for influenza vaccination  -     FLUZONE: influenza vaccine, quadrivalent, 0 5 mL    Other orders  -     ibuprofen (MOTRIN) 200 mg tablet; Take by mouth every 6 (six) hours as needed for mild pain        No problem-specific Assessment & Plan notes found for this encounter  Subjective:      Patient ID: Rosa Valles is a 45 y o  female  Pt presents for evaluation of worsening depressive symptoms   She notes stressors amongst family members as well as her dog recently passing away unexpectedly  She felt that her current regime has been working well until she had more stressors  She is tearful easily and frequently  She feels that she cannot appropriately perform her job at this time due to being distracted and so distraught and requests a short time off of work  The following portions of the patient's history were reviewed and updated as appropriate:   She has a past medical history of Anxiety, Candidiasis of mouth, Depression, Hypertension, Obesity, Sciatica, Skin rash, and Urticaria ,  does not have any pertinent problems on file  ,   has a past surgical history that includes Dental surgery  ,  family history includes Cancer in her family and father; Depression in her mother; Diabetes in her family; Heart disease in her family; Hypertension in her father; Osteoporosis in her mother; Stroke in her family; Testicular cancer in her father  ,   reports that she has never smoked  She has never used smokeless tobacco  She reports that she drinks about 3 0 standard drinks of alcohol per week  She reports that she does not use drugs  ,  is allergic to pollen extract     Current Outpatient Medications   Medication Sig Dispense Refill    ALPRAZolam (XANAX) 0 5 mg tablet Take 1 tablet (0 5 mg total) by mouth 3 (three) times a day as needed (as neded) 270 tablet 0    citalopram (CeleXA) 40 mg tablet Take 1 tablet (40 mg total) by mouth daily 30 tablet 5    ibuprofen (MOTRIN) 200 mg tablet Take by mouth every 6 (six) hours as needed for mild pain       No current facility-administered medications for this visit  Review of Systems   Constitutional: Negative for chills and fever  HENT: Negative for congestion, ear pain, hearing loss, postnasal drip, rhinorrhea, sinus pressure, sinus pain, sore throat and trouble swallowing  Eyes: Negative for pain and visual disturbance  Respiratory: Negative for cough, chest tightness, shortness of breath and wheezing      Cardiovascular: Negative  Negative for chest pain, palpitations and leg swelling  Gastrointestinal: Negative for abdominal pain, blood in stool, constipation, diarrhea, nausea and vomiting  Endocrine: Negative for cold intolerance, heat intolerance, polydipsia, polyphagia and polyuria  Genitourinary: Negative for difficulty urinating, dysuria, flank pain and urgency  Musculoskeletal: Negative for arthralgias, back pain, gait problem and myalgias  Skin: Negative for rash  Allergic/Immunologic: Negative  Neurological: Negative for dizziness, weakness, light-headedness and headaches  Hematological: Negative  Psychiatric/Behavioral: Positive for decreased concentration and dysphoric mood  Negative for behavioral problems and sleep disturbance  The patient is nervous/anxious  PHQ-9 Depression Screening    PHQ-9:    Frequency of the following problems over the past two weeks:       Little interest or pleasure in doing things:  3 - nearly every day  Feeling down, depressed, or hopeless:  3 - nearly every day  Trouble falling or staying asleep, or sleeping too much:  3 - nearly every day  Feeling tired or having little energy:  3 - nearly every day  Poor appetite or overeating:  3 - nearly every day  Feeling bad about yourself - or that you are a failure or have let yourself or your family down:  0 - not at all  Trouble concentrating on things, such as reading the newspaper or watching television:  3 - nearly every day  Moving or speaking so slowly that other people could have noticed   Or the opposite - being so fidgety or restless that you have been moving around a lot more than usual:  3 - nearly every day  Thoughts that you would be better off dead, or of hurting yourself in some way:  0 - not at all  PHQ-2 Score:  6  PHQ-9 Score:  21          Objective:  Vitals:    10/16/19 1517   BP: 148/98   BP Location: Right arm   Patient Position: Sitting   Cuff Size: Adult   Pulse: 75   Resp: 18   Temp: 98 2 °F (36 8 °C)   TempSrc: Tympanic   SpO2: 98%   Weight: 95 8 kg (211 lb 3 2 oz)   Height: 5' 3" (1 6 m)     Body mass index is 37 41 kg/m²  Physical Exam   Constitutional: She is oriented to person, place, and time  She appears well-developed and well-nourished  No distress  HENT:   Head: Normocephalic and atraumatic  Right Ear: External ear normal    Left Ear: External ear normal    Nose: Nose normal    Mouth/Throat: Oropharynx is clear and moist  No oropharyngeal exudate  Eyes: Pupils are equal, round, and reactive to light  Conjunctivae and EOM are normal  Right eye exhibits no discharge  Left eye exhibits no discharge  No scleral icterus  Neck: Normal range of motion  Neck supple  No thyromegaly present  Cardiovascular: Normal rate, regular rhythm and normal heart sounds  Exam reveals no gallop and no friction rub  No murmur heard  Pulmonary/Chest: Effort normal and breath sounds normal  No respiratory distress  She has no wheezes  She has no rales  Abdominal: Soft  Bowel sounds are normal  She exhibits no distension  There is no tenderness  Musculoskeletal: Normal range of motion  She exhibits no edema, tenderness or deformity  Neurological: She is alert and oriented to person, place, and time  No cranial nerve deficit  Skin: Skin is warm and dry  She is not diaphoretic  Psychiatric: Her behavior is normal  Judgment and thought content normal  Her mood appears anxious  She exhibits a depressed mood  Depression Screening Follow-up Plan: Patient's depression screening was positive with a PHQ-2 score of 6  Their PHQ-9 score was 21  Continue regular follow-up with their psychologist/therapist/psychiatrist who is managing their mental health condition(s)  BMI Counseling: Body mass index is 37 41 kg/m²  The BMI is above normal  Nutrition recommendations include reducing portion sizes

## 2019-11-20 ENCOUNTER — OFFICE VISIT (OUTPATIENT)
Dept: INTERNAL MEDICINE CLINIC | Facility: CLINIC | Age: 44
End: 2019-11-20
Payer: COMMERCIAL

## 2019-11-20 VITALS
BODY MASS INDEX: 37.17 KG/M2 | HEIGHT: 63 IN | RESPIRATION RATE: 16 BRPM | SYSTOLIC BLOOD PRESSURE: 124 MMHG | OXYGEN SATURATION: 98 % | WEIGHT: 209.8 LBS | DIASTOLIC BLOOD PRESSURE: 86 MMHG | TEMPERATURE: 99.1 F | HEART RATE: 88 BPM

## 2019-11-20 DIAGNOSIS — F33.41 RECURRENT MAJOR DEPRESSIVE DISORDER, IN PARTIAL REMISSION (HCC): ICD-10-CM

## 2019-11-20 DIAGNOSIS — F33.1 RECURRENT MAJOR DEPRESSIVE EPISODES, MODERATE (HCC): Primary | ICD-10-CM

## 2019-11-20 PROCEDURE — 99213 OFFICE O/P EST LOW 20 MIN: CPT | Performed by: PHYSICIAN ASSISTANT

## 2019-11-20 PROCEDURE — 1036F TOBACCO NON-USER: CPT | Performed by: PHYSICIAN ASSISTANT

## 2019-11-20 RX ORDER — CITALOPRAM 40 MG/1
40 TABLET ORAL DAILY
Qty: 30 TABLET | Refills: 0 | Status: SHIPPED | OUTPATIENT
Start: 2019-11-20 | End: 2019-11-20

## 2019-11-20 RX ORDER — ALPRAZOLAM 0.5 MG/1
0.5 TABLET ORAL 3 TIMES DAILY PRN
Qty: 90 TABLET | Refills: 0 | Status: SHIPPED | OUTPATIENT
Start: 2019-11-20 | End: 2021-09-29 | Stop reason: SDUPTHER

## 2019-11-20 NOTE — PROGRESS NOTES
Assessment/Plan:  Problem List Items Addressed This Visit        Other    Recurrent major depressive episodes, moderate (HCC) - Primary    Relevant Medications    vortioxetine (TRINTELLIX) 10 MG tablet    ALPRAZolam (XANAX) 0 5 mg tablet    Recurrent major depressive disorder, in partial remission (HCC)     Will taper and discontinue celexa in favor of trintellix  Directions for use and possible side effects discussed and patient verbalized understanding of these  Relevant Medications    vortioxetine (TRINTELLIX) 10 MG tablet    ALPRAZolam (XANAX) 0 5 mg tablet           Diagnoses and all orders for this visit:    Recurrent major depressive episodes, moderate (HCC)  -     vortioxetine (TRINTELLIX) 10 MG tablet; Take 1 tablet (10 mg total) by mouth daily    Recurrent major depressive disorder, in partial remission (HCC)  -     Discontinue: citalopram (CeleXA) 40 mg tablet; Take 1 tablet (40 mg total) by mouth daily  -     ALPRAZolam (XANAX) 0 5 mg tablet; Take 1 tablet (0 5 mg total) by mouth 3 (three) times a day as needed (as neded)        Recurrent major depressive disorder, in partial remission (HCC)  Will taper and discontinue celexa in favor of trintellix  Directions for use and possible side effects discussed and patient verbalized understanding of these  Subjective:      Patient ID: Jake Vernon is a 45 y o  female  Pt presents for follow up on her depressive symptoms  She is noting improvement but is still not doing great  She notes that the time off of work helped  She feels her stressors are very situational  She is open to trying options to see if she could get better results  She previously tried augmentation with wellbutrin, abilify, rexulti and vraylar         The following portions of the patient's history were reviewed and updated as appropriate:   She has a past medical history of Anxiety, Candidiasis of mouth, Depression, Hypertension, Obesity, Sciatica, Skin rash, and Urticaria ,  does not have any pertinent problems on file  ,   has a past surgical history that includes Dental surgery  ,  family history includes Cancer in her family and father; Depression in her mother; Diabetes in her family; Heart disease in her family; Hypertension in her father; Osteoporosis in her mother; Stroke in her family; Testicular cancer in her father  ,   reports that she has never smoked  She has never used smokeless tobacco  She reports that she drinks about 3 0 standard drinks of alcohol per week  She reports that she does not use drugs  ,  is allergic to pollen extract     Current Outpatient Medications   Medication Sig Dispense Refill    ALPRAZolam (XANAX) 0 5 mg tablet Take 1 tablet (0 5 mg total) by mouth 3 (three) times a day as needed (as neded) 90 tablet 0    ibuprofen (MOTRIN) 200 mg tablet Take by mouth every 6 (six) hours as needed for mild pain      vortioxetine (TRINTELLIX) 10 MG tablet Take 1 tablet (10 mg total) by mouth daily 30 tablet 2     No current facility-administered medications for this visit  Review of Systems   Constitutional: Negative for chills and fever  HENT: Negative for congestion, ear pain, hearing loss, postnasal drip, rhinorrhea, sinus pressure, sinus pain, sore throat and trouble swallowing  Eyes: Negative for pain and visual disturbance  Respiratory: Negative for cough, chest tightness, shortness of breath and wheezing  Cardiovascular: Negative  Negative for chest pain, palpitations and leg swelling  Gastrointestinal: Negative for abdominal pain, blood in stool, constipation, diarrhea, nausea and vomiting  Endocrine: Negative for cold intolerance, heat intolerance, polydipsia, polyphagia and polyuria  Genitourinary: Negative for difficulty urinating, dysuria, flank pain and urgency  Musculoskeletal: Negative for arthralgias, back pain, gait problem and myalgias  Skin: Negative for rash  Allergic/Immunologic: Negative  Neurological: Negative for dizziness, weakness, light-headedness and headaches  Hematological: Negative  Psychiatric/Behavioral: Positive for dysphoric mood  Negative for behavioral problems and sleep disturbance  The patient is not nervous/anxious  PHQ-9 Depression Screening    PHQ-9:    Frequency of the following problems over the past two weeks:       Little interest or pleasure in doing things:  2 - more than half the days  Feeling down, depressed, or hopeless:  3 - nearly every day  Trouble falling or staying asleep, or sleeping too much:  2 - more than half the days  Feeling tired or having little energy:  2 - more than half the days  Poor appetite or overeatin - not at all  Feeling bad about yourself - or that you are a failure or have let yourself or your family down:  0 - not at all  Trouble concentrating on things, such as reading the newspaper or watching television:  0 - not at all  Moving or speaking so slowly that other people could have noticed  Or the opposite - being so fidgety or restless that you have been moving around a lot more than usual:  0 - not at all  Thoughts that you would be better off dead, or of hurting yourself in some way:  0 - not at all  PHQ-2 Score:  5  PHQ-9 Score:  9          Objective:  Vitals:    19 0817   BP: 124/86   BP Location: Left arm   Patient Position: Sitting   Cuff Size: Large   Pulse: 88   Resp: 16   Temp: 99 1 °F (37 3 °C)   SpO2: 98%   Weight: 95 2 kg (209 lb 12 8 oz)   Height: 5' 3" (1 6 m)     Body mass index is 37 16 kg/m²  Physical Exam   Constitutional: She is oriented to person, place, and time  She appears well-developed and well-nourished  No distress  HENT:   Head: Normocephalic and atraumatic  Right Ear: External ear normal    Left Ear: External ear normal    Nose: Nose normal    Mouth/Throat: Oropharynx is clear and moist  No oropharyngeal exudate  Eyes: Pupils are equal, round, and reactive to light   Conjunctivae and EOM are normal  Right eye exhibits no discharge  Left eye exhibits no discharge  No scleral icterus  Neck: Normal range of motion  Neck supple  No thyromegaly present  Cardiovascular: Normal rate, regular rhythm and normal heart sounds  Exam reveals no gallop and no friction rub  No murmur heard  Pulmonary/Chest: Effort normal and breath sounds normal  No respiratory distress  She has no wheezes  She has no rales  Abdominal: Soft  Bowel sounds are normal  She exhibits no distension  There is no tenderness  Musculoskeletal: Normal range of motion  She exhibits no edema, tenderness or deformity  Neurological: She is alert and oriented to person, place, and time  No cranial nerve deficit  Skin: Skin is warm and dry  She is not diaphoretic  Psychiatric: Her behavior is normal  Judgment and thought content normal  Her mood appears anxious  She exhibits a depressed mood

## 2019-11-20 NOTE — ASSESSMENT & PLAN NOTE
Will taper and discontinue celexa in favor of trintellix  Directions for use and possible side effects discussed and patient verbalized understanding of these

## 2019-12-03 ENCOUNTER — APPOINTMENT (OUTPATIENT)
Dept: LAB | Facility: CLINIC | Age: 44
End: 2019-12-03
Payer: COMMERCIAL

## 2019-12-03 LAB
25(OH)D3 SERPL-MCNC: 21.1 NG/ML (ref 30–100)
ALBUMIN SERPL BCP-MCNC: 3.6 G/DL (ref 3.5–5)
ALP SERPL-CCNC: 80 U/L (ref 46–116)
ALT SERPL W P-5'-P-CCNC: 31 U/L (ref 12–78)
ANION GAP SERPL CALCULATED.3IONS-SCNC: 4 MMOL/L (ref 4–13)
AST SERPL W P-5'-P-CCNC: 16 U/L (ref 5–45)
BASOPHILS # BLD AUTO: 0.03 THOUSANDS/ΜL (ref 0–0.1)
BASOPHILS NFR BLD AUTO: 0 % (ref 0–1)
BILIRUB SERPL-MCNC: 0.53 MG/DL (ref 0.2–1)
BUN SERPL-MCNC: 12 MG/DL (ref 5–25)
CALCIUM SERPL-MCNC: 9 MG/DL (ref 8.3–10.1)
CHLORIDE SERPL-SCNC: 106 MMOL/L (ref 100–108)
CHOLEST SERPL-MCNC: 227 MG/DL (ref 50–200)
CO2 SERPL-SCNC: 27 MMOL/L (ref 21–32)
CREAT SERPL-MCNC: 0.68 MG/DL (ref 0.6–1.3)
EOSINOPHIL # BLD AUTO: 0.15 THOUSAND/ΜL (ref 0–0.61)
EOSINOPHIL NFR BLD AUTO: 2 % (ref 0–6)
ERYTHROCYTE [DISTWIDTH] IN BLOOD BY AUTOMATED COUNT: 12.6 % (ref 11.6–15.1)
GFR SERPL CREATININE-BSD FRML MDRD: 108 ML/MIN/1.73SQ M
GLUCOSE P FAST SERPL-MCNC: 91 MG/DL (ref 65–99)
HCT VFR BLD AUTO: 41.8 % (ref 34.8–46.1)
HDLC SERPL-MCNC: 40 MG/DL
HGB BLD-MCNC: 13.6 G/DL (ref 11.5–15.4)
IMM GRANULOCYTES # BLD AUTO: 0.02 THOUSAND/UL (ref 0–0.2)
IMM GRANULOCYTES NFR BLD AUTO: 0 % (ref 0–2)
LDLC SERPL CALC-MCNC: 168 MG/DL (ref 0–100)
LYMPHOCYTES # BLD AUTO: 1.77 THOUSANDS/ΜL (ref 0.6–4.47)
LYMPHOCYTES NFR BLD AUTO: 23 % (ref 14–44)
MCH RBC QN AUTO: 30.2 PG (ref 26.8–34.3)
MCHC RBC AUTO-ENTMCNC: 32.5 G/DL (ref 31.4–37.4)
MCV RBC AUTO: 93 FL (ref 82–98)
MONOCYTES # BLD AUTO: 0.38 THOUSAND/ΜL (ref 0.17–1.22)
MONOCYTES NFR BLD AUTO: 5 % (ref 4–12)
NEUTROPHILS # BLD AUTO: 5.44 THOUSANDS/ΜL (ref 1.85–7.62)
NEUTS SEG NFR BLD AUTO: 70 % (ref 43–75)
NRBC BLD AUTO-RTO: 0 /100 WBCS
PLATELET # BLD AUTO: 419 THOUSANDS/UL (ref 149–390)
PMV BLD AUTO: 9.6 FL (ref 8.9–12.7)
POTASSIUM SERPL-SCNC: 4 MMOL/L (ref 3.5–5.3)
PROT SERPL-MCNC: 7.5 G/DL (ref 6.4–8.2)
RBC # BLD AUTO: 4.5 MILLION/UL (ref 3.81–5.12)
SODIUM SERPL-SCNC: 137 MMOL/L (ref 136–145)
TRIGL SERPL-MCNC: 97 MG/DL
TSH SERPL DL<=0.05 MIU/L-ACNC: 3.14 UIU/ML (ref 0.36–3.74)
WBC # BLD AUTO: 7.79 THOUSAND/UL (ref 4.31–10.16)

## 2019-12-03 PROCEDURE — 85025 COMPLETE CBC W/AUTO DIFF WBC: CPT | Performed by: PHYSICIAN ASSISTANT

## 2019-12-03 PROCEDURE — 84443 ASSAY THYROID STIM HORMONE: CPT | Performed by: PHYSICIAN ASSISTANT

## 2019-12-03 PROCEDURE — 36415 COLL VENOUS BLD VENIPUNCTURE: CPT | Performed by: PHYSICIAN ASSISTANT

## 2019-12-03 PROCEDURE — 80061 LIPID PANEL: CPT | Performed by: PHYSICIAN ASSISTANT

## 2019-12-03 PROCEDURE — 80053 COMPREHEN METABOLIC PANEL: CPT | Performed by: PHYSICIAN ASSISTANT

## 2019-12-03 PROCEDURE — 82306 VITAMIN D 25 HYDROXY: CPT | Performed by: PHYSICIAN ASSISTANT

## 2019-12-12 ENCOUNTER — TELEPHONE (OUTPATIENT)
Dept: INTERNAL MEDICINE CLINIC | Facility: CLINIC | Age: 44
End: 2019-12-12

## 2019-12-12 NOTE — TELEPHONE ENCOUNTER
Patient called back re the Trintellix-this was denied by her insurance-please look at the letter from Emily Viera in her chart -it has alternatives for you to rx  in place of it  She also said she does not want to start a statin-she will watch her diet   She started OTC Vit D (due to the low result )    thanks

## 2019-12-17 DIAGNOSIS — M54.41 ACUTE RIGHT-SIDED LOW BACK PAIN WITH RIGHT-SIDED SCIATICA: ICD-10-CM

## 2019-12-17 DIAGNOSIS — F33.1 RECURRENT MAJOR DEPRESSIVE EPISODES, MODERATE (HCC): Primary | ICD-10-CM

## 2019-12-17 RX ORDER — DULOXETIN HYDROCHLORIDE 30 MG/1
30 CAPSULE, DELAYED RELEASE ORAL DAILY
Qty: 30 CAPSULE | Refills: 5 | Status: SHIPPED | OUTPATIENT
Start: 2019-12-17 | End: 2020-02-26

## 2020-01-21 RX ORDER — CITALOPRAM 40 MG/1
40 TABLET ORAL DAILY
Refills: 0 | COMMUNITY
Start: 2019-11-20 | End: 2020-03-06 | Stop reason: SDUPTHER

## 2020-01-22 ENCOUNTER — OFFICE VISIT (OUTPATIENT)
Dept: INTERNAL MEDICINE CLINIC | Facility: CLINIC | Age: 45
End: 2020-01-22
Payer: COMMERCIAL

## 2020-01-22 VITALS
RESPIRATION RATE: 16 BRPM | BODY MASS INDEX: 38.27 KG/M2 | HEIGHT: 63 IN | WEIGHT: 216 LBS | SYSTOLIC BLOOD PRESSURE: 130 MMHG | DIASTOLIC BLOOD PRESSURE: 80 MMHG | HEART RATE: 94 BPM | TEMPERATURE: 98.1 F

## 2020-01-22 DIAGNOSIS — F33.41 RECURRENT MAJOR DEPRESSIVE DISORDER, IN PARTIAL REMISSION (HCC): Primary | ICD-10-CM

## 2020-01-22 PROCEDURE — 99213 OFFICE O/P EST LOW 20 MIN: CPT | Performed by: PHYSICIAN ASSISTANT

## 2020-01-22 NOTE — ASSESSMENT & PLAN NOTE
Start cymbalta as previously recommended  Therapy contacts provided  Encouraged healthy diet and exercise as well

## 2020-01-22 NOTE — PROGRESS NOTES
Assessment/Plan:  Problem List Items Addressed This Visit     None           There are no diagnoses linked to this encounter  No problem-specific Assessment & Plan notes found for this encounter  Subjective:      Patient ID: Jake Vernon is a 45 y o  female  Pt presents for 1 month follow up  She never started the cymbalta and states she just never went to pick it up for no apparent reason  She is agreeable to starting it as discussed at last visit  If she has a hard time tolerating this we can then try for Trintellix again  She is interested in seeing a therapist for help with her situational stressors as well  The following portions of the patient's history were reviewed and updated as appropriate:   She has a past medical history of Anxiety, Candidiasis of mouth, Depression, Hypertension, Obesity, Sciatica, Skin rash, and Urticaria ,  does not have any pertinent problems on file  ,   has a past surgical history that includes Dental surgery  ,  family history includes Cancer in her family and father; Depression in her mother; Diabetes in her family; Heart disease in her family; Hypertension in her father; Osteoporosis in her mother; Stroke in her family; Testicular cancer in her father  ,   reports that she has never smoked  She has never used smokeless tobacco  She reports that she drinks about 3 0 standard drinks of alcohol per week  She reports that she does not use drugs  ,  is allergic to pollen extract     Current Outpatient Medications   Medication Sig Dispense Refill    ALPRAZolam (XANAX) 0 5 mg tablet Take 1 tablet (0 5 mg total) by mouth 3 (three) times a day as needed (as neded) 90 tablet 0    citalopram (CeleXA) 40 mg tablet Take 40 mg by mouth daily  0    ibuprofen (MOTRIN) 200 mg tablet Take by mouth every 6 (six) hours as needed for mild pain      DULoxetine (CYMBALTA) 30 mg delayed release capsule Take 1 capsule (30 mg total) by mouth daily (Patient not taking: Reported on 1/22/2020) 30 capsule 5     No current facility-administered medications for this visit  Review of Systems   Constitutional: Negative for chills and fever  HENT: Negative for congestion, ear pain, hearing loss, postnasal drip, rhinorrhea, sinus pressure, sinus pain, sore throat and trouble swallowing  Eyes: Negative for pain and visual disturbance  Respiratory: Negative for cough, chest tightness, shortness of breath and wheezing  Cardiovascular: Negative  Negative for chest pain, palpitations and leg swelling  Gastrointestinal: Negative for abdominal pain, blood in stool, constipation, diarrhea, nausea and vomiting  Endocrine: Negative for cold intolerance, heat intolerance, polydipsia, polyphagia and polyuria  Genitourinary: Negative for difficulty urinating, dysuria, flank pain and urgency  Musculoskeletal: Negative for arthralgias, back pain, gait problem and myalgias  Skin: Negative for rash  Allergic/Immunologic: Negative  Neurological: Negative for dizziness, weakness, light-headedness and headaches  Hematological: Negative  Psychiatric/Behavioral: Positive for dysphoric mood  Negative for behavioral problems and sleep disturbance  The patient is not nervous/anxious  PHQ-9 Depression Screening    PHQ-9:    Frequency of the following problems over the past two weeks:               Objective:  Vitals:    01/22/20 0804   BP: 130/80   Pulse: 94   Resp: 16   Temp: 98 1 °F (36 7 °C)   TempSrc: Tympanic   Weight: 98 kg (216 lb)   Height: 5' 3" (1 6 m)     Body mass index is 38 26 kg/m²  Physical Exam   Constitutional: She is oriented to person, place, and time  She appears well-developed and well-nourished  No distress  HENT:   Head: Normocephalic and atraumatic  Right Ear: External ear normal    Left Ear: External ear normal    Nose: Nose normal    Mouth/Throat: Oropharynx is clear and moist  No oropharyngeal exudate     Eyes: Pupils are equal, round, and reactive to light  Conjunctivae and EOM are normal  Right eye exhibits no discharge  Left eye exhibits no discharge  No scleral icterus  Neck: Normal range of motion  Neck supple  No thyromegaly present  Cardiovascular: Normal rate, regular rhythm and normal heart sounds  Exam reveals no gallop and no friction rub  No murmur heard  Pulmonary/Chest: Effort normal and breath sounds normal  No respiratory distress  She has no wheezes  She has no rales  Abdominal: Soft  Bowel sounds are normal  She exhibits no distension  There is no tenderness  Musculoskeletal: Normal range of motion  She exhibits no edema, tenderness or deformity  Neurological: She is alert and oriented to person, place, and time  No cranial nerve deficit  Skin: Skin is warm and dry  She is not diaphoretic  Psychiatric: Her behavior is normal  Judgment and thought content normal  She exhibits a depressed mood  BMI Counseling: Body mass index is 38 26 kg/m²  The BMI is above normal  Nutrition recommendations include decreasing portion sizes, consuming healthier snacks and limiting drinks that contain sugar

## 2020-02-26 ENCOUNTER — OFFICE VISIT (OUTPATIENT)
Dept: INTERNAL MEDICINE CLINIC | Facility: CLINIC | Age: 45
End: 2020-02-26
Payer: COMMERCIAL

## 2020-02-26 VITALS
DIASTOLIC BLOOD PRESSURE: 86 MMHG | WEIGHT: 221.4 LBS | RESPIRATION RATE: 16 BRPM | SYSTOLIC BLOOD PRESSURE: 122 MMHG | BODY MASS INDEX: 39.23 KG/M2 | TEMPERATURE: 97.8 F | HEART RATE: 70 BPM | HEIGHT: 63 IN | OXYGEN SATURATION: 98 %

## 2020-02-26 DIAGNOSIS — F33.41 RECURRENT MAJOR DEPRESSIVE DISORDER, IN PARTIAL REMISSION (HCC): ICD-10-CM

## 2020-02-26 DIAGNOSIS — F33.1 RECURRENT MAJOR DEPRESSIVE EPISODES, MODERATE (HCC): Primary | ICD-10-CM

## 2020-02-26 PROBLEM — L30.4 INTERTRIGO: Status: RESOLVED | Noted: 2018-08-14 | Resolved: 2020-02-26

## 2020-02-26 PROCEDURE — 3008F BODY MASS INDEX DOCD: CPT | Performed by: PHYSICIAN ASSISTANT

## 2020-02-26 PROCEDURE — 3074F SYST BP LT 130 MM HG: CPT | Performed by: PHYSICIAN ASSISTANT

## 2020-02-26 PROCEDURE — 1036F TOBACCO NON-USER: CPT | Performed by: PHYSICIAN ASSISTANT

## 2020-02-26 PROCEDURE — 3079F DIAST BP 80-89 MM HG: CPT | Performed by: PHYSICIAN ASSISTANT

## 2020-02-26 PROCEDURE — 99214 OFFICE O/P EST MOD 30 MIN: CPT | Performed by: PHYSICIAN ASSISTANT

## 2020-02-26 RX ORDER — BUPROPION HYDROCHLORIDE 150 MG/1
150 TABLET ORAL EVERY MORNING
Qty: 30 TABLET | Refills: 5 | Status: SHIPPED | OUTPATIENT
Start: 2020-02-26 | End: 2020-04-08

## 2020-02-26 RX ORDER — OMEPRAZOLE 20 MG/1
20 TABLET, DELAYED RELEASE ORAL DAILY
COMMUNITY

## 2020-02-26 NOTE — ASSESSMENT & PLAN NOTE
Continue celexa at present dose and augment with wellbutrin   Directions for use and possible side effects discussed and patient verbalized understanding of these

## 2020-02-26 NOTE — PROGRESS NOTES
Assessment/Plan:  Problem List Items Addressed This Visit        Other    Recurrent major depressive episodes, moderate (HCC) - Primary     Continue celexa at present dose and augment with wellbutrin   Directions for use and possible side effects discussed and patient verbalized understanding of these  Relevant Medications    buPROPion (WELLBUTRIN XL) 150 mg 24 hr tablet    RESOLVED: Recurrent major depressive disorder, in partial remission (HCC)    Relevant Medications    buPROPion (WELLBUTRIN XL) 150 mg 24 hr tablet           Diagnoses and all orders for this visit:    Recurrent major depressive episodes, moderate (HCC)  -     buPROPion (WELLBUTRIN XL) 150 mg 24 hr tablet; Take 1 tablet (150 mg total) by mouth every morning    Recurrent major depressive disorder, in partial remission ([facility] [address] )    Other orders  -     VITAMIN D PO; Take 1,000 Units by mouth daily  -     omeprazole (PriLOSEC OTC) 20 MG tablet; Take 20 mg by mouth daily        Recurrent major depressive episodes, moderate (HCC)  Continue celexa at present dose and augment with wellbutrin   Directions for use and possible side effects discussed and patient verbalized understanding of these  Subjective:      Patient ID: Sathish Silveira is a 45 y o  female  Pt presents for routine visit  She notes that she tried cymbalta but had a hard time tolerating it due to GI side effects so she discontinued this after several days  She previously tried and failed celexa, rexulti and vraylar  She went back on celexa which helps but not enough  She continues with poor energy and motivation and loss of interest        The following portions of the patient's history were reviewed and updated as appropriate:   She has a past medical history of Anxiety, Candidiasis of mouth, Depression, Hypertension, Obesity, Sciatica, Skin rash, and Urticaria ,  does not have any pertinent problems on file  ,   has a past surgical history that includes Dental surgery  ,  family history includes Cancer in her family and father; Depression in her mother; Diabetes in her family; Heart disease in her family; Hypertension in her father; Osteoporosis in her mother; Stroke in her family; Testicular cancer in her father  ,   reports that she has never smoked  She has never used smokeless tobacco  She reports that she drinks about 3 0 standard drinks of alcohol per week  She reports that she does not use drugs  ,  is allergic to pollen extract     Current Outpatient Medications   Medication Sig Dispense Refill    ALPRAZolam (XANAX) 0 5 mg tablet Take 1 tablet (0 5 mg total) by mouth 3 (three) times a day as needed (as neded) 90 tablet 0    citalopram (CeleXA) 40 mg tablet Take 40 mg by mouth daily  0    ibuprofen (MOTRIN) 200 mg tablet Take by mouth every 6 (six) hours as needed for mild pain      omeprazole (PriLOSEC OTC) 20 MG tablet Take 20 mg by mouth daily      VITAMIN D PO Take 1,000 Units by mouth daily      buPROPion (WELLBUTRIN XL) 150 mg 24 hr tablet Take 1 tablet (150 mg total) by mouth every morning 30 tablet 5     No current facility-administered medications for this visit  Review of Systems   Constitutional: Negative for chills and fever  HENT: Negative for congestion, ear pain, hearing loss, postnasal drip, rhinorrhea, sinus pressure, sinus pain, sore throat and trouble swallowing  Eyes: Negative for pain and visual disturbance  Respiratory: Negative for cough, chest tightness, shortness of breath and wheezing  Cardiovascular: Negative  Negative for chest pain, palpitations and leg swelling  Gastrointestinal: Negative for abdominal pain, blood in stool, constipation, diarrhea, nausea and vomiting  Endocrine: Negative for cold intolerance, heat intolerance, polydipsia, polyphagia and polyuria  Genitourinary: Negative for difficulty urinating, dysuria, flank pain and urgency     Musculoskeletal: Negative for arthralgias, back pain, gait problem and myalgias  Skin: Negative for rash  Allergic/Immunologic: Negative  Neurological: Negative for dizziness, weakness, light-headedness and headaches  Hematological: Negative  Psychiatric/Behavioral: Positive for dysphoric mood  Negative for behavioral problems and sleep disturbance  The patient is nervous/anxious  PHQ-9 Depression Screening    PHQ-9:    Frequency of the following problems over the past two weeks:               Objective:  Vitals:    02/26/20 0825   BP: 122/86   BP Location: Left arm   Patient Position: Sitting   Cuff Size: Large   Pulse: 70   Resp: 16   Temp: 97 8 °F (36 6 °C)   SpO2: 98%   Weight: 100 kg (221 lb 6 4 oz)   Height: 5' 3" (1 6 m)     Body mass index is 39 22 kg/m²  Physical Exam   Constitutional: She is oriented to person, place, and time  She appears well-developed and well-nourished  No distress  HENT:   Head: Normocephalic and atraumatic  Right Ear: External ear normal    Left Ear: External ear normal    Nose: Nose normal    Mouth/Throat: Oropharynx is clear and moist  No oropharyngeal exudate  Eyes: Pupils are equal, round, and reactive to light  Conjunctivae and EOM are normal  Right eye exhibits no discharge  Left eye exhibits no discharge  No scleral icterus  Neck: Normal range of motion  Neck supple  No thyromegaly present  Cardiovascular: Normal rate, regular rhythm and normal heart sounds  Exam reveals no gallop and no friction rub  No murmur heard  Pulmonary/Chest: Effort normal and breath sounds normal  No respiratory distress  She has no wheezes  She has no rales  Abdominal: Soft  Bowel sounds are normal  She exhibits no distension  There is no tenderness  Musculoskeletal: Normal range of motion  She exhibits no edema, tenderness or deformity  Neurological: She is alert and oriented to person, place, and time  No cranial nerve deficit  Skin: Skin is warm and dry  She is not diaphoretic     Psychiatric: Her behavior is normal  Judgment and thought content normal  Her mood appears anxious  She exhibits a depressed mood

## 2020-03-05 DIAGNOSIS — F33.1 RECURRENT MAJOR DEPRESSIVE EPISODES, MODERATE (HCC): Primary | ICD-10-CM

## 2020-03-06 DIAGNOSIS — F33.1 RECURRENT MAJOR DEPRESSIVE EPISODES, MODERATE (HCC): Primary | ICD-10-CM

## 2020-03-06 RX ORDER — CITALOPRAM 40 MG/1
40 TABLET ORAL DAILY
Qty: 90 TABLET | Refills: 1 | Status: SHIPPED | OUTPATIENT
Start: 2020-03-06 | End: 2021-02-24 | Stop reason: SDUPTHER

## 2020-04-08 ENCOUNTER — TELEMEDICINE (OUTPATIENT)
Dept: INTERNAL MEDICINE CLINIC | Facility: CLINIC | Age: 45
End: 2020-04-08
Payer: COMMERCIAL

## 2020-04-08 VITALS — HEIGHT: 63 IN | WEIGHT: 215 LBS | BODY MASS INDEX: 38.09 KG/M2

## 2020-04-08 DIAGNOSIS — F33.1 RECURRENT MAJOR DEPRESSIVE EPISODES, MODERATE (HCC): Primary | ICD-10-CM

## 2020-04-08 PROCEDURE — 99212 OFFICE O/P EST SF 10 MIN: CPT | Performed by: PHYSICIAN ASSISTANT

## 2020-04-08 RX ORDER — QUETIAPINE FUMARATE 50 MG/1
50 TABLET, EXTENDED RELEASE ORAL
Qty: 30 TABLET | Refills: 2 | Status: SHIPPED | OUTPATIENT
Start: 2020-04-08 | End: 2020-09-22

## 2020-04-15 ENCOUNTER — TELEMEDICINE (OUTPATIENT)
Dept: INTERNAL MEDICINE CLINIC | Facility: CLINIC | Age: 45
End: 2020-04-15
Payer: COMMERCIAL

## 2020-04-15 VITALS
HEART RATE: 66 BPM | WEIGHT: 217 LBS | DIASTOLIC BLOOD PRESSURE: 102 MMHG | BODY MASS INDEX: 38.44 KG/M2 | SYSTOLIC BLOOD PRESSURE: 178 MMHG

## 2020-04-15 DIAGNOSIS — F33.1 RECURRENT MAJOR DEPRESSIVE EPISODES, MODERATE (HCC): Primary | ICD-10-CM

## 2020-04-15 PROCEDURE — 99213 OFFICE O/P EST LOW 20 MIN: CPT | Performed by: PHYSICIAN ASSISTANT

## 2020-06-25 ENCOUNTER — TELEPHONE (OUTPATIENT)
Dept: INTERNAL MEDICINE CLINIC | Facility: CLINIC | Age: 45
End: 2020-06-25

## 2020-06-25 DIAGNOSIS — F33.1 RECURRENT MAJOR DEPRESSIVE EPISODES, MODERATE (HCC): Primary | ICD-10-CM

## 2020-06-26 ENCOUNTER — TELEPHONE (OUTPATIENT)
Dept: PSYCHIATRY | Facility: CLINIC | Age: 45
End: 2020-06-26

## 2020-09-22 ENCOUNTER — APPOINTMENT (EMERGENCY)
Dept: RADIOLOGY | Facility: HOSPITAL | Age: 45
End: 2020-09-22
Payer: COMMERCIAL

## 2020-09-22 ENCOUNTER — HOSPITAL ENCOUNTER (EMERGENCY)
Facility: HOSPITAL | Age: 45
Discharge: HOME | End: 2020-09-22
Attending: EMERGENCY MEDICINE | Admitting: EMERGENCY MEDICINE
Payer: COMMERCIAL

## 2020-09-22 VITALS
SYSTOLIC BLOOD PRESSURE: 153 MMHG | WEIGHT: 173 LBS | BODY MASS INDEX: 30.65 KG/M2 | OXYGEN SATURATION: 98 % | DIASTOLIC BLOOD PRESSURE: 79 MMHG | RESPIRATION RATE: 18 BRPM | TEMPERATURE: 97 F | HEIGHT: 63 IN | HEART RATE: 69 BPM

## 2020-09-22 DIAGNOSIS — M25.50 ARTHRALGIA: ICD-10-CM
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
## 2020-09-22 DIAGNOSIS — M25.572 LEFT ANKLE PAIN: Primary | ICD-10-CM

## 2020-09-22 PROCEDURE — 99282 EMERGENCY DEPT VISIT SF MDM: CPT | Performed by: EMERGENCY MEDICINE

## 2020-09-22 PROCEDURE — 99283 EMERGENCY DEPT VISIT LOW MDM: CPT

## 2020-09-22 PROCEDURE — 73610 X-RAY EXAM OF ANKLE: CPT

## 2020-09-22 NOTE — Clinical Note
Marcia Juniorанна was seen and treated in our emergency department on 9/22/2020  Diagnosis:     Ino Zambrano    She may return on this date: 09/24/2020         If you have any questions or concerns, please don't hesitate to call        Pat Client, DO    ______________________________           _______________          _______________  Hospital Representative                              Date                                Time

## 2020-09-22 NOTE — ED PROVIDER NOTES
History  Chief Complaint   Patient presents with    Ankle Pain     left ankle pain, swelling, redness, and warmth  worse at night  better in the morning  Started Friday, has had similar flares in the past, but not diagnosed with anything  Patient is a 45yof presenting with left ankle pain which started over the weekend and started to improve  Patient went for a run Friday night and hand mild ankle discomfort, worsened over the weekend  She has a photo on her phone which shows a mildly swollen left ankle which has since resolved  She still has some residual pain but much improved  Denies fever, chills, n/v/d, surgery  Patient has had soreness on that ankle in the past, usually worse with exertion  Ankle Pain   Location:  Ankle  Time since incident:  2 days  Injury: no    Ankle location:  L ankle  Pain details:     Quality:  Dull    Radiates to:  Does not radiate    Severity:  Mild    Onset quality:  Gradual    Timing:  Constant    Progression:  Partially resolved  Chronicity:  Recurrent  Dislocation: no    Foreign body present:  No foreign bodies  Prior injury to area:  No  Relieved by:  Acetaminophen and NSAIDs  Worsened by:  Nothing  Associated symptoms: no back pain and no fever        Prior to Admission Medications   Prescriptions Last Dose Informant Patient Reported? Taking?    ALPRAZolam (XANAX) 0 5 mg tablet Past Week at Unknown time  No Yes   Sig: Take 1 tablet (0 5 mg total) by mouth 3 (three) times a day as needed (as neded)   VITAMIN D PO 9/22/2020 at Unknown time  Yes Yes   Sig: Take 1,000 Units by mouth daily   citalopram (CeleXA) 40 mg tablet 9/22/2020 at Unknown time  No Yes   Sig: Take 1 tablet (40 mg total) by mouth daily   ibuprofen (MOTRIN) 200 mg tablet Past Week at Unknown time  Yes Yes   Sig: Take by mouth every 6 (six) hours as needed for mild pain   omeprazole (PriLOSEC OTC) 20 MG tablet 9/22/2020 at Unknown time  Yes Yes   Sig: Take 20 mg by mouth daily      Facility-Administered Medications: None       Past Medical History:   Diagnosis Date    Anxiety     Candidiasis of mouth     Last Assessed [id number]    Depression     Hypertension     Obesity     Sciatica     Last Assessed [id number]    Skin rash     Last Assessed [id number]    Urticaria     Last Assessed [id number]       Past Surgical History:   Procedure Laterality Date    DENTAL SURGERY         Family History   Problem Relation Age of Onset    Depression Mother     Osteoporosis Mother     Testicular cancer Father     Cancer Father     Hypertension Father     Cancer Family     Diabetes Family     Heart disease Family     Stroke Family         Stroke syndrome     I have reviewed and agree with the history as documented  E-Cigarette/Vaping    E-Cigarette Use Never User      E-Cigarette/Vaping Substances    Nicotine No     THC No     CBD No     Flavoring No     Other No     Unknown No      Social History     Tobacco Use    Smoking status: Never Smoker    Smokeless tobacco: Never Used   Substance Use Topics    Alcohol use: Yes     Alcohol/week: 3 0 standard drinks     Types: 3 Glasses of wine per week     Comment: Social    Drug use: No       Review of Systems   Constitutional: Negative for chills and fever  HENT: Negative for congestion, nosebleeds, rhinorrhea and sore throat  Eyes: Negative for pain and visual disturbance  Respiratory: Negative for cough and wheezing  Cardiovascular: Negative for chest pain and leg swelling  Gastrointestinal: Negative for abdominal distention, abdominal pain, diarrhea, nausea and vomiting  Genitourinary: Negative for dysuria and frequency  Musculoskeletal: Negative for back pain and joint swelling  Skin: Negative for rash and wound  Neurological: Negative for weakness and numbness  Psychiatric/Behavioral: Negative for decreased concentration and suicidal ideas  Physical Exam  Physical Exam  Vitals signs and nursing note reviewed     Constitutional: Appearance: She is well-developed  HENT:      Head: Normocephalic and atraumatic  Eyes:      Conjunctiva/sclera: Conjunctivae normal       Pupils: Pupils are equal, round, and reactive to light  Neck:      Musculoskeletal: Normal range of motion and neck supple  Trachea: No tracheal deviation  Cardiovascular:      Rate and Rhythm: Normal rate and regular rhythm  Heart sounds: Normal heart sounds  No murmur  Pulmonary:      Effort: Pulmonary effort is normal  No respiratory distress  Breath sounds: Normal breath sounds  No wheezing or rales  Abdominal:      General: Bowel sounds are normal  There is no distension  Palpations: Abdomen is soft  Tenderness: There is no abdominal tenderness  Musculoskeletal:         General: No swelling, deformity or signs of injury  Right lower leg: No edema  Comments: FROM bilateral LE  Right ankle no swelling, no erythema  Some tenderness on passive ROM   Skin:     General: Skin is warm and dry  Capillary Refill: Capillary refill takes less than 2 seconds  Neurological:      Mental Status: She is alert and oriented to person, place, and time  Sensory: No sensory deficit  Psychiatric:         Judgment: Judgment normal          Vital Signs  ED Triage Vitals [09/22/20 0715]   Temperature Pulse Respirations Blood Pressure SpO2   (!) 97 °F (36 1 °C) 69 18 153/79 98 %      Temp Source Heart Rate Source Patient Position - Orthostatic VS BP Location FiO2 (%)   Temporal Monitor Sitting Right arm --      Pain Score       --           Vitals:    09/22/20 0715   BP: 153/79   Pulse: 69   Patient Position - Orthostatic VS: Sitting         Visual Acuity      ED Medications  Medications - No data to display    Diagnostic Studies  Results Reviewed     None                 XR ankle 3+ views LEFT   Final Result by Tina Zelaya MD (09/22 7037)      No acute osseous abnormality  Mild soft tissue swelling        Workstation performed: [id number]                    Procedures  Procedures         ED Course  ED Course as of Sep 22 0808   Tue Sep 22, 2020   0807 XR mild soft tissue swelling, no osseous abnormality as interpreted by me  Will use ACE, recommend RICE to patient  Ortho f/u  She is comfortable with this plan  MDM  Number of Diagnoses or Management Options  Left ankle pain: new and requires workup  Diagnosis management comments: Patient with gradual onset left ankle pain after exercise  Likely osteoarthritis vs stress injury  Not consistent with septic arthritis  Could be gout however mostly resolved  Will xray r/u occult fracture  Recommend RICE therapy +/- NSAID       Amount and/or Complexity of Data Reviewed  Review and summarize past medical records: yes  Independent visualization of images, tracings, or specimens: yes    Risk of Complications, Morbidity, and/or Mortality  Presenting problems: moderate  Diagnostic procedures: minimal  Management options: minimal        Disposition  Final diagnoses:   Left ankle pain   Overuse injury   Arthralgia     Time reflects when diagnosis was documented in both MDM as applicable and the Disposition within this note     Time User Action Codes Description Comment    9/22/2020  7:26 AM Wendy Box Add [M25 572] Left ankle pain     9/22/2020  7:32 AM Wendy Box Add [X50  3XXA] Overuse injury     9/22/2020  7:32 AM Wendy Box Add [M25 50] Arthralgia       ED Disposition     ED Disposition Condition Date/Time Comment    Discharge Stable Tue Sep 22, 2020  7:55 AM Sruthi Byers discharge to home/self care              Follow-up Information     Follow up With Specialties Details Why Contact Info    Ginger Marks PA-C Family Medicine, Internal Medicine, Physician Assistant  As needed [address]      Jane Barrios,  Orthopedic Surgery Schedule an appointment as soon as possible for a visit in 2 days  [address]  [address]            Patient's Medications   Discharge Prescriptions    No medications on file     No discharge procedures on file      PDMP Review       Value Time User    PDMP Reviewed  Yes 10/16/2019  3:25 PM Martin Alberto PA-C          ED Provider  Electronically Signed by           Gloria Rosenberg DO  09/22/20 8917

## 2020-09-22 NOTE — Clinical Note
Americo Tapia was seen and treated in our emergency department on 9/22/2020  Diagnosis:     Tim Erickson    She may return on this date: 09/24/2020         If you have any questions or concerns, please don't hesitate to call        Ramirez Nelson, DO    ______________________________           _______________          _______________  Hospital Representative                              Date                                Time

## 2020-09-22 NOTE — DISCHARGE INSTRUCTIONS
You were seen in the emergency department for left ankle pain which appears to be improving  Your Xrays were reassuring  As we discussed, your symptoms are likely due to overuse/stress, and possibly some underlying arthritis  Follow up with orthopedic surgery for further management  We recommend RICE therapy (Rest, Ice, Compression, and Elevation)  Return if your symptoms worsen or if new symptoms develop, if you develop signs of infection, or any additional concerns

## 2020-09-23 ENCOUNTER — OFFICE VISIT (OUTPATIENT)
Dept: OBGYN CLINIC | Facility: CLINIC | Age: 45
End: 2020-09-23
Payer: COMMERCIAL

## 2020-09-23 VITALS
BODY MASS INDEX: 30.83 KG/M2 | TEMPERATURE: 98.4 F | HEIGHT: 63 IN | DIASTOLIC BLOOD PRESSURE: 62 MMHG | SYSTOLIC BLOOD PRESSURE: 120 MMHG | WEIGHT: 174 LBS

## 2020-09-23 DIAGNOSIS — M25.472 PAIN AND SWELLING OF ANKLE, LEFT: Primary | ICD-10-CM

## 2020-09-23 DIAGNOSIS — M25.572 PAIN AND SWELLING OF ANKLE, LEFT: Primary | ICD-10-CM

## 2020-09-23 DIAGNOSIS — S96.819A STRAIN OF EXTENSOR TENDON OF FOOT: ICD-10-CM

## 2020-09-23 PROCEDURE — 99213 OFFICE O/P EST LOW 20 MIN: CPT | Performed by: FAMILY MEDICINE

## 2020-09-23 RX ORDER — INDOMETHACIN 50 MG/1
50 CAPSULE ORAL 2 TIMES DAILY WITH MEALS
Qty: 45 CAPSULE | Refills: 0 | Status: SHIPPED | OUTPATIENT
Start: 2020-09-23 | End: 2020-09-23

## 2020-09-23 NOTE — PROGRESS NOTES
[facility] Ignacia Joaquin  [address]  [address] [place] [address] [phone]      Chief Complaint:  Chief Complaint   Patient presents with    Left Ankle - Pain       Vitals:  /62 (BP Location: Right arm, Patient Position: Sitting, Cuff Size: Standard)   Temp 98 4 °F (36 9 °C) (Temporal)   Ht 5' 3" (1 6 m)   Wt 78 9 kg (174 lb)   LMP 09/01/2020 (Approximate)   BMI 30 82 kg/m²     The following portions of the patient's history were reviewed and updated as appropriate: allergies, current medications, past family history, past medical history, past social history, past surgical history, and problem list       Subjective:   Patient ID: Edson Brice is a 39 y o  female  Here c/o L ankle pain  Started Friday while working  No injury  Felt a dull ache  She runs 2-3 miles 2-3 x per week  She ran on Friday with the pain  Worse after running 2 miles  Swelled up  Worse at night  Seen in ER yesterday- XR was normal   Thinks she has hx of gout in the ankle joint- red, painful, swelling- cant put sheets on and then resolved in 2-3 days with ibuprofen 400 mg daily- no dx by medical provider  Present for about 5 days  Less pain in the day, worse at night  She was taking ibuprofen/tylenol pm at night  Horrible at night  Happens every 1-2 years since she was a kid  No injury    LEFT ANKLE     INDICATION:   ankle pain      COMPARISON:  None     VIEWS:  XR ANKLE 3+ VW LEFT   Images: 3     FINDINGS:     There is no acute fracture or dislocation      No significant degenerative changes      No lytic or blastic osseous lesion      Mild soft tissue swelling anteriorly      IMPRESSION:     No acute osseous abnormality  Mild soft tissue swelling      Workstation performed: [id number]         Review of Systems   Constitutional: Negative for fatigue and fever  Respiratory: Negative for shortness of breath  Cardiovascular: Negative for chest pain     Gastrointestinal: Negative for abdominal pain and nausea  Musculoskeletal: Positive for arthralgias, gait problem and joint swelling  Skin: Negative for rash and wound  Neurological: Negative for weakness and headaches  Objective:  Left Ankle Exam     Tenderness   Left ankle tenderness location: anterior ankle TTP, warm  pain wtih movement  Swelling: mild    Range of Motion   Dorsiflexion: abnormal   Plantar flexion: abnormal   Eversion: abnormal   Inversion: abnormal     Other   Erythema: absent  Pulse: present            Physical Exam  Vitals signs and nursing note reviewed  Constitutional:       Appearance: Normal appearance  She is well-developed  HENT:      Head: Normocephalic  Mouth/Throat:      Mouth: Mucous membranes are moist    Eyes:      Extraocular Movements: Extraocular movements intact  Neck:      Musculoskeletal: Normal range of motion  Cardiovascular:      Rate and Rhythm: Normal rate and regular rhythm  Heart sounds: Normal heart sounds  Pulmonary:      Effort: Pulmonary effort is normal       Breath sounds: Normal breath sounds  Abdominal:      General: Bowel sounds are normal       Palpations: Abdomen is soft  Musculoskeletal:         General: Tenderness present  Skin:     General: Skin is warm and dry  Neurological:      General: No focal deficit present  Mental Status: She is alert and oriented to person, place, and time  Psychiatric:         Mood and Affect: Mood normal          Behavior: Behavior normal          Thought Content: Thought content normal          I have personally reviewed pertinent films in PACS  Assessment/Plan:  Assessment/Plan   Diagnoses and all orders for this visit:    Pain and swelling of ankle, left  -     Cancel: Uric acid; Future  -     Discontinue: indomethacin (INDOCIN) 50 mg capsule;  Take 1 capsule (50 mg total) by mouth 2 (two) times a day with meals  -     Cam Boot    Strain of extensor tendon of foot        Return in about 1 week (around 9/30/2020) for Bhupinder Obrien MD

## 2020-09-23 NOTE — PATIENT INSTRUCTIONS
F/u 1 wk  Most likely extensor hallicus longus tendon strain  Will try walking boot  Icing/Ibuprofen 600 mg TID for  1 wk  Take foot out of boot 4 x per day  Dont sleep with boot  Ankle Exercises   AMBULATORY CARE:   What you need to know about ankle exercises: Ankle exercises help strengthen your ankle and improve its function after injury  These are beginning exercises  Ask your healthcare provider if you need to see a physical therapist for more advanced exercises  · Do these exercises 3 to 5 days a week , or as directed by your healthcare provider  Ask if you should perform the exercises on each ankle  · Do the exercises in the order that your healthcare provider recommends  This will help prevent swelling, chronic pain, and reinjury  Start with range of motion exercises  Then progress to strengthening exercises, and finally to balancing exercises  · Warm up before you do ankle exercises  Walk or ride a stationary bike for 5 to 10 minutes to prepare your ankle for movement  · Stop if you feel pain  It is normal to feel some discomfort at first  Regular exercise will help decrease your discomfort over time  How to perform range of motion exercises safely:  Begin with range of motion exercises to improve flexibility  Ask your healthcare provider when you can progress to strengthening exercises  · Ankle alphabet:  Sit on a chair so that your feet do not touch the floor  Use your big toe to write each letter of the alphabet  Use only your foot and ankle, and keep your movements small  Do 2 sets  · Calf stretches:      ¨ Sitting calf stretches with a towel:  Sit on the floor with both legs out straight in front of you  Loop a towel around the ball of your injured foot  Grasp the ends of the towel and pull it toward you  Keep your leg and back straight  Do not lean forward as you pull the towel  Hold for 30 seconds  Then relax for 30 seconds  Do 2 sets of 10             ¨ Standing calf stretches:  Stand facing a wall with the foot that is not injured forward and your knee slightly bent  Keep the leg with the injured foot straight and behind you with your toes pointed in slightly  With both heels flat on the floor, press your hips forward  Do not arch your back  Hold for 30 seconds, and then relax for 30 seconds  Do 2 sets of 10  Repeat with your leg bent  Do 2 sets of 10  How to perform strengthening exercises safely:  After you can perform range of motion exercises without pain, you may begin strengthening exercises  Ask your healthcare provider when you can progress to balancing exercises  · Ankle movement in 4 directions:  Sit on the floor with your legs straight in front of you  Keep your heels on the floor for support  ¨ Dorsiflexion:  Begin with your toes pointing straight up  Pull your toes toward your body  Slowly return to the starting position  Do 3 sets of 5      ¨ Plantar flexion:  Begin with your toes pointing straight up  Push your toes away from your body  Slowly return to the starting position  Do 3 sets of 5            ¨ Inversion:  Begin with your toes pointing straight up  Push your toes inward, toward each other  Slowly return to the starting position  Do 3 sets of 5      ¨ Eversion:  Begin with your toes pointing straight up  Push your toes outward, away from each other  Slowly return to the starting position  Do 3 sets of 5          · Toe curls with a towel:  Sit on a chair so that both of your feet are flat on the floor  Place a small towel on the floor in front of your injured foot  Grab the center of the towel with your toes and curl the towel toward you  Relax and repeat  Do 1 set of 5            · Sunbright pick-ups:  Sit on a chair so that both of your feet are flat on the floor  Place 20 marbles on the floor in front of your injured foot  Use your toes to  one marble at a time and place it into a bowl  Repeat until you have picked up all the marbles   Do 1 set      · Heel raises:      ¨ Single leg heel raises:  Stand with your weight evenly on both feet  Hold on to a chair or a wall for balance  Lift the foot that is not injured off the floor so all your weight is placed on your injured foot  Raise the heel of your injured foot as high as you can  Slowly lower your heel to the floor  Do 1 set of 10  ¨ Double leg heel raises:  Stand with your weight evenly on both feet  Hold on to a chair or a wall for balance  Raise both of your heels as high as you can  Slowly lower your heels to the floor  Do 1 set of 10  · Heel and toe walks:      ¨ Heel walks:  Begin in a standing position  Lift your toes off the floor and walk on your heels  Keep your toes lifted as high as possible  Do 2 sets of 10  ¨ Toe walks:  Begin in a standing position  Lift your heels off the floor and walk on the balls and toes of your feet  Keep your heels lifted as high as possible  Do 2 sets of 10  How to perform a balance exercise safely:  After you can perform strengthening exercises without pain, you may do this beginning balancing exercise  Ask your healthcare provider for more advanced balance exercises  · Single leg stance:  Stand with your weight evenly on both feet, or hold on to a chair or a wall  Do not lean to the side  Lift the foot that is not injured off the floor so all your weight is placed on your injured foot  Balance on your injured foot  Ask your healthcare provider how long to hold this position  Contact your healthcare provider if:   · Your pain becomes worse  · You have new pain  · You have questions or concerns about your condition, care, or exercise program   © 2017 [facility] Information is for End User's use only and may not be sold, redistributed or otherwise used for commercial purposes   All illustrations and images included in CareNotes® are the copyrighted property of [employer]  or [employer]  The above information is an  only  It is not intended as medical advice for individual conditions or treatments  Talk to your doctor, nurse or pharmacist before following any medical regimen to see if it is safe and effective for you

## 2020-11-24 ENCOUNTER — TELEPHONE (OUTPATIENT)
Dept: INTERNAL MEDICINE CLINIC | Facility: CLINIC | Age: 45
End: 2020-11-24

## 2020-12-08 NOTE — TELEPHONE ENCOUNTER
Spoke with pt and she is willing to try cymbalta  She would like the lowest dose possible and uses [id number]  High Dose Vitamin A Counseling: Side effects reviewed, pt to contact office should one occur.

## 2020-12-12 ENCOUNTER — TELEPHONE (OUTPATIENT)
Dept: INTERNAL MEDICINE CLINIC | Facility: CLINIC | Age: 45
End: 2020-12-12

## 2020-12-12 DIAGNOSIS — Z20.822 EXPOSURE TO COVID-19 VIRUS: Primary | ICD-10-CM

## 2020-12-15 DIAGNOSIS — Z20.822 EXPOSURE TO COVID-19 VIRUS: ICD-10-CM

## 2020-12-15 PROCEDURE — U0003 INFECTIOUS AGENT DETECTION BY NUCLEIC ACID (DNA OR RNA); SEVERE ACUTE RESPIRATORY SYNDROME CORONAVIRUS 2 (SARS-COV-2) (CORONAVIRUS DISEASE [COVID-19]), AMPLIFIED PROBE TECHNIQUE, MAKING USE OF HIGH THROUGHPUT TECHNOLOGIES AS DESCRIBED BY CMS-2020-01-R: HCPCS | Performed by: PHYSICIAN ASSISTANT

## 2020-12-16 ENCOUNTER — TELEMEDICINE (OUTPATIENT)
Dept: INTERNAL MEDICINE CLINIC | Facility: CLINIC | Age: 45
End: 2020-12-16
Payer: COMMERCIAL

## 2020-12-16 VITALS
HEART RATE: 88 BPM | SYSTOLIC BLOOD PRESSURE: 136 MMHG | HEIGHT: 63 IN | TEMPERATURE: 97.5 F | DIASTOLIC BLOOD PRESSURE: 87 MMHG | BODY MASS INDEX: 30.82 KG/M2

## 2020-12-16 DIAGNOSIS — U07.1 COVID-19: Primary | ICD-10-CM

## 2020-12-16 LAB — SARS-COV-2 RNA SPEC QL NAA+PROBE: DETECTED

## 2020-12-16 PROCEDURE — 99213 OFFICE O/P EST LOW 20 MIN: CPT | Performed by: PHYSICIAN ASSISTANT

## 2020-12-18 ENCOUNTER — TELEMEDICINE (OUTPATIENT)
Dept: INTERNAL MEDICINE CLINIC | Facility: CLINIC | Age: 45
End: 2020-12-18
Payer: COMMERCIAL

## 2020-12-18 VITALS
WEIGHT: 158 LBS | HEART RATE: 62 BPM | BODY MASS INDEX: 28 KG/M2 | SYSTOLIC BLOOD PRESSURE: 105 MMHG | TEMPERATURE: 98.5 F | DIASTOLIC BLOOD PRESSURE: 70 MMHG | HEIGHT: 63 IN

## 2020-12-18 DIAGNOSIS — U07.1 COVID-19: Primary | ICD-10-CM

## 2020-12-18 PROCEDURE — 99213 OFFICE O/P EST LOW 20 MIN: CPT | Performed by: PHYSICIAN ASSISTANT

## 2020-12-21 ENCOUNTER — TELEMEDICINE (OUTPATIENT)
Dept: INTERNAL MEDICINE CLINIC | Facility: CLINIC | Age: 45
End: 2020-12-21
Payer: COMMERCIAL

## 2020-12-21 VITALS — BODY MASS INDEX: 27.46 KG/M2 | TEMPERATURE: 99.1 F | HEIGHT: 63 IN | WEIGHT: 155 LBS

## 2020-12-21 DIAGNOSIS — U07.1 COVID-19: Primary | ICD-10-CM

## 2020-12-21 PROCEDURE — 99213 OFFICE O/P EST LOW 20 MIN: CPT | Performed by: PHYSICIAN ASSISTANT

## 2020-12-22 ENCOUNTER — APPOINTMENT (OUTPATIENT)
Dept: RADIOLOGY | Facility: CLINIC | Age: 45
End: 2020-12-22
Payer: COMMERCIAL

## 2020-12-22 ENCOUNTER — TELEMEDICINE (OUTPATIENT)
Dept: INTERNAL MEDICINE CLINIC | Facility: CLINIC | Age: 45
End: 2020-12-22
Payer: COMMERCIAL

## 2020-12-22 VITALS — BODY MASS INDEX: 27.46 KG/M2 | TEMPERATURE: 97.6 F | WEIGHT: 155 LBS | HEIGHT: 63 IN

## 2020-12-22 DIAGNOSIS — U07.1 COVID-19: ICD-10-CM

## 2020-12-22 DIAGNOSIS — U07.1 COVID-19: Primary | ICD-10-CM

## 2020-12-22 PROCEDURE — 71046 X-RAY EXAM CHEST 2 VIEWS: CPT

## 2020-12-22 PROCEDURE — 99213 OFFICE O/P EST LOW 20 MIN: CPT | Performed by: PHYSICIAN ASSISTANT

## 2020-12-23 ENCOUNTER — TELEMEDICINE (OUTPATIENT)
Dept: INTERNAL MEDICINE CLINIC | Facility: CLINIC | Age: 45
End: 2020-12-23
Payer: COMMERCIAL

## 2020-12-23 VITALS
HEIGHT: 63 IN | WEIGHT: 155 LBS | BODY MASS INDEX: 27.46 KG/M2 | HEART RATE: 115 BPM | OXYGEN SATURATION: 97 % | TEMPERATURE: 99.1 F

## 2020-12-23 DIAGNOSIS — U07.1 COVID-19: Primary | ICD-10-CM

## 2020-12-23 PROCEDURE — 99213 OFFICE O/P EST LOW 20 MIN: CPT | Performed by: PHYSICIAN ASSISTANT

## 2020-12-24 ENCOUNTER — TELEMEDICINE (OUTPATIENT)
Dept: INTERNAL MEDICINE CLINIC | Facility: CLINIC | Age: 45
End: 2020-12-24
Payer: COMMERCIAL

## 2020-12-24 VITALS — BODY MASS INDEX: 27.46 KG/M2 | TEMPERATURE: 98.1 F | WEIGHT: 155 LBS | HEIGHT: 63 IN

## 2020-12-24 DIAGNOSIS — U07.1 COVID-19: Primary | ICD-10-CM

## 2020-12-24 PROCEDURE — 99213 OFFICE O/P EST LOW 20 MIN: CPT | Performed by: PHYSICIAN ASSISTANT

## 2020-12-25 ENCOUNTER — TELEMEDICINE (OUTPATIENT)
Dept: INTERNAL MEDICINE CLINIC | Facility: CLINIC | Age: 45
End: 2020-12-25
Payer: COMMERCIAL

## 2020-12-25 DIAGNOSIS — U07.1 COVID-19: Primary | ICD-10-CM

## 2020-12-25 PROCEDURE — 99213 OFFICE O/P EST LOW 20 MIN: CPT | Performed by: PHYSICIAN ASSISTANT

## 2020-12-28 ENCOUNTER — TELEMEDICINE (OUTPATIENT)
Dept: INTERNAL MEDICINE CLINIC | Facility: CLINIC | Age: 45
End: 2020-12-28
Payer: COMMERCIAL

## 2020-12-28 VITALS — BODY MASS INDEX: 27.11 KG/M2 | HEIGHT: 63 IN | TEMPERATURE: 96.9 F | WEIGHT: 153 LBS

## 2020-12-28 DIAGNOSIS — U07.1 COVID-19: Primary | ICD-10-CM

## 2020-12-28 PROCEDURE — 99213 OFFICE O/P EST LOW 20 MIN: CPT | Performed by: PHYSICIAN ASSISTANT

## 2020-12-30 ENCOUNTER — TELEMEDICINE (OUTPATIENT)
Dept: INTERNAL MEDICINE CLINIC | Facility: CLINIC | Age: 45
End: 2020-12-30
Payer: COMMERCIAL

## 2020-12-30 VITALS — OXYGEN SATURATION: 98 % | HEART RATE: 66 BPM | TEMPERATURE: 97.8 F

## 2020-12-30 DIAGNOSIS — U07.1 COVID-19: Primary | ICD-10-CM

## 2020-12-30 PROCEDURE — 99213 OFFICE O/P EST LOW 20 MIN: CPT | Performed by: PHYSICIAN ASSISTANT

## 2021-01-01 ENCOUNTER — TELEMEDICINE (OUTPATIENT)
Dept: INTERNAL MEDICINE CLINIC | Facility: CLINIC | Age: 46
End: 2021-01-01
Payer: COMMERCIAL

## 2021-01-01 DIAGNOSIS — E78.2 MIXED HYPERLIPIDEMIA: ICD-10-CM

## 2021-01-01 DIAGNOSIS — E55.9 VITAMIN D DEFICIENCY: ICD-10-CM

## 2021-01-01 DIAGNOSIS — Z13.29 SCREENING FOR THYROID DISORDER: ICD-10-CM

## 2021-01-01 DIAGNOSIS — U07.1 COVID-19: Primary | ICD-10-CM

## 2021-01-01 DIAGNOSIS — Z13.0 SCREENING FOR DEFICIENCY ANEMIA: ICD-10-CM

## 2021-01-01 DIAGNOSIS — I10 ESSENTIAL HYPERTENSION: ICD-10-CM

## 2021-01-01 PROCEDURE — 99213 OFFICE O/P EST LOW 20 MIN: CPT | Performed by: PHYSICIAN ASSISTANT

## 2021-01-01 NOTE — PROGRESS NOTES
COVID-19 Virtual Visit     Assessment/Plan:    Problem List Items Addressed This Visit        Other    COVID-19 - Primary         Disposition:     I recommended continued isolation until at least 24 hours have passed since recovery defined as resolution of fever without the use of fever-reducing medications and improvement in respiratory symptoms (e g , cough, shortness of breath) AND 10 days have passed since onset of symptoms  I have spent 15 minutes directly with the patient  Greater than 50% of this time was spent in counseling/coordination of care regarding: prognosis, risks and benefits of treatment options, instructions for management, patient and family education, importance of treatment compliance, risk factor reductions and impressions  Encounter provider Citlalli Davis PA-C    Provider located at [address] [place] [phone]    Recent Visits  Date Type Provider Dept   12/30/20 Telemedicine [facility]   12/28/20 Telemedicine [facility]   12/25/20 [facility]medicine [facility]   Showing recent visits within past 7 days and meeting all other requirements     Today's Visits  Date Type Provider Dept   01/01/21 Telemedicine JUAN JOSE Jim[facility]nez [facility] today's visits and meeting all other requirements     Future Appointments  No visits were found meeting these conditions  Showing future appointments within next 150 days and meeting all other requirements      This virtual check-in was done via [employer] and patient was informed that this is a secure, HIPAA-compliant platform  She agrees to proceed  Patient agrees to participate in a virtual check in via telephone or video visit instead of presenting to the office to address urgent/immediate medical needs  Patient is aware this is a billable service      After connecting through [facility], the patient was identified by name and date of birth  Puma Leger was informed that this was a telemedicine visit and that the exam was being conducted confidentially over secure lines  Puma Leger acknowledged consent and understanding of privacy and security of the telemedicine visit  I informed the patient that I have reviewed her record in Epic and presented the opportunity for her to ask any questions regarding the visit today  The patient agreed to participate  Subjective:   Puma Leger is a 39 y o  female who has been screened for COVID-19  Symptom change since last report: unchanged  Date of symptom onset: 12/16/2020    Patient's symptoms include fatigue, anosmia, loss of taste and myalgias  Patient denies fever, chills, congestion, rhinorrhea, sore throat, cough, shortness of breath, chest tightness, abdominal pain, nausea, vomiting, diarrhea and headaches  Chelsi Guzman has been staying home and has isolated themselves in her home  She is taking care to not share personal items and is cleaning all surfaces that are touched often, like counters, tabletops, and doorknobs using household cleaning sprays or wipes  She is wearing a mask when she leaves her room       Lab Results   Component Value Date    SARSCOV2 Detected (A) 12/15/2020     Past Medical History:   Diagnosis Date    Anxiety     Candidiasis of mouth     Last Assessed [id number]    Depression     Hypertension     Obesity     Sciatica     Last Assessed [id number]    Skin rash     Last Assessed [id number]    Urticaria     Last Assessed [id number]     Past Surgical History:   Procedure Laterality Date   [facility] [place] [facility]       Current Outpatient Medications   Medication Sig Dispense Refill    ALPRAZolam (XANAX) 0 5 mg tablet Take 1 tablet (0 5 mg total) by mouth 3 (three) times a day as needed (as neded) 90 tablet 0    citalopram (CeleXA) 40 mg tablet Take 1 tablet (40 mg total) by mouth daily 90 tablet 1    ibuprofen (MOTRIN) 200 mg tablet Take by mouth every 6 (six) hours as needed for mild pain      omeprazole (PriLOSEC OTC) 20 MG tablet Take 20 mg by mouth daily      VITAMIN D PO Take 1,000 Units by mouth daily       No current facility-administered medications for this visit  Allergies   Allergen Reactions    Pollen Extract Allergic Rhinitis       Review of Systems   Constitutional: Positive for fatigue  Negative for chills and fever  HENT: Negative for congestion, ear pain, hearing loss, postnasal drip, rhinorrhea, sinus pressure, sinus pain, sore throat and trouble swallowing  Eyes: Negative for pain and visual disturbance  Respiratory: Negative for cough, chest tightness, shortness of breath and wheezing  Cardiovascular: Negative  Negative for chest pain, palpitations and leg swelling  Gastrointestinal: Negative for abdominal pain, blood in stool, constipation, diarrhea, nausea and vomiting  Endocrine: Negative for cold intolerance, heat intolerance, polydipsia, polyphagia and polyuria  Genitourinary: Negative for difficulty urinating, dysuria, flank pain and urgency  Musculoskeletal: Positive for myalgias  Negative for arthralgias, back pain and gait problem  Skin: Negative for rash  Allergic/Immunologic: Negative  Neurological: Positive for dizziness and light-headedness  Negative for weakness and headaches  Hematological: Negative  Psychiatric/Behavioral: Negative for behavioral problems, dysphoric mood and sleep disturbance  The patient is not nervous/anxious  Objective: There were no vitals filed for this visit  Physical Exam  Constitutional:       Appearance: She is well-developed  HENT:      Head: Normocephalic and atraumatic  Nose: Nose normal    Eyes:      Conjunctiva/sclera: Conjunctivae normal    Neck:      Musculoskeletal: Normal range of motion     Pulmonary:      Effort: Pulmonary effort is normal    Musculoskeletal: Normal range of motion  Neurological:      Mental Status: She is alert and oriented to person, place, and time  Psychiatric:         Behavior: Behavior normal          Thought Content: Thought content normal          Judgment: Judgment normal        VIRTUAL VISIT DISCLAIMER    Kyleangeliqueashok Pearson acknowledges that she has consented to an online visit or consultation  She understands that the online visit is based solely on information provided by her, and that, in the absence of a face-to-face physical evaluation by the physician, the diagnosis she receives is both limited and provisional in terms of accuracy and completeness  This is not intended to replace a full medical face-to-face evaluation by the physician  Gemma Pearson understands and accepts these terms

## 2021-01-03 ENCOUNTER — TELEMEDICINE (OUTPATIENT)
Dept: INTERNAL MEDICINE CLINIC | Facility: CLINIC | Age: 46
End: 2021-01-03
Payer: COMMERCIAL

## 2021-01-03 DIAGNOSIS — U07.1 COVID-19: Primary | ICD-10-CM

## 2021-01-03 PROCEDURE — 99213 OFFICE O/P EST LOW 20 MIN: CPT | Performed by: PHYSICIAN ASSISTANT

## 2021-01-03 NOTE — PROGRESS NOTES
COVID-19 Virtual Visit     Assessment/Plan:    Problem List Items Addressed This Visit        Other    COVID-19 - Primary         Disposition:     I recommended continued isolation until at least 24 hours have passed since recovery defined as resolution of fever without the use of fever-reducing medications and improvement in respiratory symptoms (e g , cough, shortness of breath) AND 10 days have passed since onset of symptoms  I have spent 15 minutes directly with the patient  Greater than 50% of this time was spent in counseling/coordination of care regarding: risks and benefits of treatment options, instructions for management, patient and family education, importance of treatment compliance, risk factor reductions and impressions  Encounter provider Marv Comer PA-C    Provider located at [address] [place] [phone]    Recent Visits  Date Type Provider Dept   01/01/21 Telemedicine Marv Comer PA-C [facility]   12/30/20 Telemedicine Marv Comer PA-C [facility]   12/28/20 Telemedicine Marv Comer PA-C [facility]   Showing recent visits within past 7 days and meeting all other requirements     Today's Visits  Date Type Provider Dept   01/03/21 Telemedicine JUAN JOSE Jim[facility]nez [facility] today's visits and meeting all other requirements     Future Appointments  No visits were found meeting these conditions  Showing future appointments within next 150 days and meeting all other requirements      This virtual check-in was done via [employer] and patient was informed that this is a secure, HIPAA-compliant platform  She agrees to proceed  Patient agrees to participate in a virtual check in via telephone or video visit instead of presenting to the office to address urgent/immediate medical needs  Patient is aware this is a billable service      After connecting through [facility], the patient was identified by name and date of birth  Marlo Lutz was informed that this was a telemedicine visit and that the exam was being conducted confidentially over secure lines  Marlo Lutz acknowledged consent and understanding of privacy and security of the telemedicine visit  I informed the patient that I have reviewed her record in Epic and presented the opportunity for her to ask any questions regarding the visit today  The patient agreed to participate  Subjective:   Marlo Lutz is a 39 y o  female who has been screened for COVID-19  Symptom change since last report: improving  Date of symptom onset: 12/15/2020    Patient's symptoms include fatigue  Patient denies fever, chills, congestion, rhinorrhea, sore throat, cough, shortness of breath, chest tightness, abdominal pain, nausea, vomiting, diarrhea, myalgias and headaches  Staying home and isolating themselves?: has not      Taking care not to share personal items?: is not      Cleaning all surfaces that are touched often?: is not      Wearing a mask when leaving room?: is not      Pt is on day 19  She is steadily improving  She continues with episodes of dizziness, continues with intense fatigue at times       Lab Results   Component Value Date    SARSCOV2 Detected (A) 12/15/2020     Past Medical History:   Diagnosis Date    Anxiety     Candidiasis of mouth     Last Assessed [id number]    Depression     Hypertension     Obesity     Sciatica     Last Assessed [id number]    Skin rash     Last Assessed [id number]    Urticaria     Last Assessed [id number]     Past Surgical History:   Procedure Laterality Date   [facility] [place] [facility]       Current Outpatient Medications   Medication Sig Dispense Refill    ALPRAZolam (XANAX) 0 5 mg tablet Take 1 tablet (0 5 mg total) by mouth 3 (three) times a day as needed (as neded) 90 tablet 0    citalopram (CeleXA) 40 mg tablet Take 1 tablet (40 mg total) by mouth daily 90 tablet 1    ibuprofen (MOTRIN) 200 mg tablet Take by mouth every 6 (six) hours as needed for mild pain      omeprazole (PriLOSEC OTC) 20 MG tablet Take 20 mg by mouth daily      VITAMIN D PO Take 1,000 Units by mouth daily       No current facility-administered medications for this visit  Allergies   Allergen Reactions    Pollen Extract Allergic Rhinitis       Review of Systems   Constitutional: Positive for fatigue  Negative for chills and fever  HENT: Negative for congestion, ear pain, hearing loss, postnasal drip, rhinorrhea, sinus pressure, sinus pain, sore throat and trouble swallowing  Eyes: Negative for pain and visual disturbance  Respiratory: Negative for cough, chest tightness, shortness of breath and wheezing  Cardiovascular: Negative  Negative for chest pain, palpitations and leg swelling  Gastrointestinal: Negative for abdominal pain, blood in stool, constipation, diarrhea, nausea and vomiting  Endocrine: Negative for cold intolerance, heat intolerance, polydipsia, polyphagia and polyuria  Genitourinary: Negative for difficulty urinating, dysuria, flank pain and urgency  Musculoskeletal: Negative for arthralgias, back pain, gait problem and myalgias  Skin: Negative for rash  Allergic/Immunologic: Negative  Neurological: Negative for dizziness, weakness, light-headedness and headaches  Hematological: Negative  Psychiatric/Behavioral: Negative for behavioral problems, dysphoric mood and sleep disturbance  The patient is not nervous/anxious  Objective: There were no vitals filed for this visit  Physical Exam  Constitutional:       Appearance: She is well-developed  HENT:      Head: Normocephalic and atraumatic  Nose: Nose normal    Eyes:      Conjunctiva/sclera: Conjunctivae normal    Neck:      Musculoskeletal: Normal range of motion  Pulmonary:      Effort: Pulmonary effort is normal    Musculoskeletal: Normal range of motion     Neurological: Mental Status: She is alert and oriented to person, place, and time  Psychiatric:         Behavior: Behavior normal          Thought Content: Thought content normal          Judgment: Judgment normal        VIRTUAL VISIT DISCLAIMER    Claudette Halter acknowledges that she has consented to an online visit or consultation  She understands that the online visit is based solely on information provided by her, and that, in the absence of a face-to-face physical evaluation by the physician, the diagnosis she receives is both limited and provisional in terms of accuracy and completeness  This is not intended to replace a full medical face-to-face evaluation by the physician  Claudette Halter understands and accepts these terms

## 2021-01-04 ENCOUNTER — LAB (OUTPATIENT)
Dept: LAB | Facility: CLINIC | Age: 46
End: 2021-01-04
Payer: COMMERCIAL

## 2021-01-04 DIAGNOSIS — E55.9 VITAMIN D DEFICIENCY: ICD-10-CM

## 2021-01-04 DIAGNOSIS — I10 ESSENTIAL HYPERTENSION: ICD-10-CM

## 2021-01-04 DIAGNOSIS — U07.1 COVID-19: ICD-10-CM

## 2021-01-04 DIAGNOSIS — Z13.0 SCREENING FOR DEFICIENCY ANEMIA: ICD-10-CM

## 2021-01-04 DIAGNOSIS — E78.2 MIXED HYPERLIPIDEMIA: ICD-10-CM

## 2021-01-04 DIAGNOSIS — Z13.29 SCREENING FOR THYROID DISORDER: ICD-10-CM

## 2021-01-04 LAB
25(OH)D3 SERPL-MCNC: 26.3 NG/ML (ref 30–100)
ALBUMIN SERPL BCP-MCNC: 3.1 G/DL (ref 3.5–5)
ALP SERPL-CCNC: 67 U/L (ref 46–116)
ALT SERPL W P-5'-P-CCNC: 29 U/L (ref 12–78)
ANION GAP SERPL CALCULATED.3IONS-SCNC: 4 MMOL/L (ref 4–13)
AST SERPL W P-5'-P-CCNC: 14 U/L (ref 5–45)
BASOPHILS # BLD AUTO: 0.03 THOUSANDS/ΜL (ref 0–0.1)
BASOPHILS NFR BLD AUTO: 0 % (ref 0–1)
BILIRUB SERPL-MCNC: 0.38 MG/DL (ref 0.2–1)
BUN SERPL-MCNC: 16 MG/DL (ref 5–25)
CALCIUM ALBUM COR SERPL-MCNC: 9.7 MG/DL (ref 8.3–10.1)
CALCIUM SERPL-MCNC: 9 MG/DL (ref 8.3–10.1)
CHLORIDE SERPL-SCNC: 111 MMOL/L (ref 100–108)
CHOLEST SERPL-MCNC: 183 MG/DL (ref 50–200)
CO2 SERPL-SCNC: 26 MMOL/L (ref 21–32)
CREAT SERPL-MCNC: 0.54 MG/DL (ref 0.6–1.3)
D DIMER PPP FEU-MCNC: 0.28 UG/ML FEU
EOSINOPHIL # BLD AUTO: 0.05 THOUSAND/ΜL (ref 0–0.61)
EOSINOPHIL NFR BLD AUTO: 1 % (ref 0–6)
ERYTHROCYTE [DISTWIDTH] IN BLOOD BY AUTOMATED COUNT: 12.1 % (ref 11.6–15.1)
GFR SERPL CREATININE-BSD FRML MDRD: 116 ML/MIN/1.73SQ M
GLUCOSE P FAST SERPL-MCNC: 89 MG/DL (ref 65–99)
HCT VFR BLD AUTO: 40.2 % (ref 34.8–46.1)
HDLC SERPL-MCNC: 34 MG/DL
HGB BLD-MCNC: 12.6 G/DL (ref 11.5–15.4)
IMM GRANULOCYTES # BLD AUTO: 0.01 THOUSAND/UL (ref 0–0.2)
IMM GRANULOCYTES NFR BLD AUTO: 0 % (ref 0–2)
LDLC SERPL CALC-MCNC: 126 MG/DL (ref 0–100)
LYMPHOCYTES # BLD AUTO: 1.7 THOUSANDS/ΜL (ref 0.6–4.47)
LYMPHOCYTES NFR BLD AUTO: 23 % (ref 14–44)
MCH RBC QN AUTO: 30.1 PG (ref 26.8–34.3)
MCHC RBC AUTO-ENTMCNC: 31.3 G/DL (ref 31.4–37.4)
MCV RBC AUTO: 96 FL (ref 82–98)
MONOCYTES # BLD AUTO: 0.48 THOUSAND/ΜL (ref 0.17–1.22)
MONOCYTES NFR BLD AUTO: 6 % (ref 4–12)
NEUTROPHILS # BLD AUTO: 5.19 THOUSANDS/ΜL (ref 1.85–7.62)
NEUTS SEG NFR BLD AUTO: 70 % (ref 43–75)
NONHDLC SERPL-MCNC: 149 MG/DL
NRBC BLD AUTO-RTO: 0 /100 WBCS
PLATELET # BLD AUTO: 479 THOUSANDS/UL (ref 149–390)
PMV BLD AUTO: 9.6 FL (ref 8.9–12.7)
POTASSIUM SERPL-SCNC: 4.4 MMOL/L (ref 3.5–5.3)
PROT SERPL-MCNC: 6.6 G/DL (ref 6.4–8.2)
RBC # BLD AUTO: 4.19 MILLION/UL (ref 3.81–5.12)
SODIUM SERPL-SCNC: 141 MMOL/L (ref 136–145)
TRIGL SERPL-MCNC: 116 MG/DL
TSH SERPL DL<=0.05 MIU/L-ACNC: 2.19 UIU/ML (ref 0.36–3.74)
WBC # BLD AUTO: 7.46 THOUSAND/UL (ref 4.31–10.16)

## 2021-01-04 PROCEDURE — 85025 COMPLETE CBC W/AUTO DIFF WBC: CPT

## 2021-01-04 PROCEDURE — 80061 LIPID PANEL: CPT

## 2021-01-04 PROCEDURE — 85379 FIBRIN DEGRADATION QUANT: CPT

## 2021-01-04 PROCEDURE — 80053 COMPREHEN METABOLIC PANEL: CPT

## 2021-01-04 PROCEDURE — 84443 ASSAY THYROID STIM HORMONE: CPT

## 2021-01-04 PROCEDURE — 36415 COLL VENOUS BLD VENIPUNCTURE: CPT

## 2021-01-04 PROCEDURE — 82306 VITAMIN D 25 HYDROXY: CPT

## 2021-01-05 ENCOUNTER — TELEMEDICINE (OUTPATIENT)
Dept: INTERNAL MEDICINE CLINIC | Facility: CLINIC | Age: 46
End: 2021-01-05
Payer: COMMERCIAL

## 2021-01-05 VITALS
BODY MASS INDEX: 28.7 KG/M2 | SYSTOLIC BLOOD PRESSURE: 100 MMHG | WEIGHT: 162 LBS | DIASTOLIC BLOOD PRESSURE: 76 MMHG | HEART RATE: 70 BPM | TEMPERATURE: 98 F | OXYGEN SATURATION: 96 % | HEIGHT: 63 IN

## 2021-01-05 DIAGNOSIS — U07.1 COVID-19: Primary | ICD-10-CM

## 2021-01-05 PROCEDURE — 99213 OFFICE O/P EST LOW 20 MIN: CPT | Performed by: PHYSICIAN ASSISTANT

## 2021-01-05 NOTE — PROGRESS NOTES
COVID-19 Virtual Visit     Assessment/Plan:    Problem List Items Addressed This Visit        Other    COVID-19 - Primary         Disposition:     I recommended continued isolation until at least 24 hours have passed since recovery defined as resolution of fever without the use of fever-reducing medications and improvement in respiratory symptoms (e g , cough, shortness of breath) AND 10 days have passed since onset of symptoms  I have spent 15 minutes directly with the patient  Greater than 50% of this time was spent in counseling/coordination of care regarding: prognosis, risks and benefits of treatment options, instructions for management, patient and family education, importance of treatment compliance, risk factor reductions and impressions  Pt is interested in donating plasma once she fully recovers  Encounter provider Malinda Casey PA-C    Provider located at [address] [place] [phone]    Recent Visits  Date Type Provider Dept   01/03/21 [facility] JUAN JOSE Gonzalez [facility]   01/01/21 Telemedicine Evy Parnell PA-C [facility]   12/30/20 Telemedicine Evy Parnell PA-C [facility]   Showing recent visits within past 7 days and meeting all other requirements     Today's Visits  Date Type Provider Dept   01/05/21 Telemedicine JUAN JOSE Jiménez Pg today's visits and meeting all other requirements     Future Appointments  No visits were found meeting these conditions  Showing future appointments within next 150 days and meeting all other requirements      This virtual check-in was done via [employer] and patient was informed that this is a secure, HIPAA-compliant platform  She agrees to proceed      Patient agrees to participate in a virtual check in via telephone or video visit instead of presenting to the office to address urgent/immediate medical needs  Patient is aware this is a billable service  After connecting through [facility], the patient was identified by name and date of birth  Miguel Hammond was informed that this was a telemedicine visit and that the exam was being conducted confidentially over secure lines  Miguel Hammond acknowledged consent and understanding of privacy and security of the telemedicine visit  I informed the patient that I have reviewed her record in Epic and presented the opportunity for her to ask any questions regarding the visit today  The patient agreed to participate  Subjective:   Miguel Hammond is a 39 y o  female who has been screened for COVID-19  Symptom change since last report: improving  Date of symptom onset: 12/16/2020    Patient's symptoms include fatigue, nasal congestion, sore throat, cough and chest tightness  Patient denies fever, chills, rhinorrhea, shortness of breath, abdominal pain, nausea, vomiting, diarrhea, myalgias and headaches  Chris Perry has been staying home and has isolated themselves in her home  She is taking care to not share personal items and is cleaning all surfaces that are touched often, like counters, tabletops, and doorknobs using household cleaning sprays or wipes  She is wearing a mask when she leaves her room  Pt is slowly improving  She still has quite active symptoms of fatigue, dizziness, cough, and sore throat       Lab Results   Component Value Date    SARSCOV2 Detected (A) 12/15/2020     Past Medical History:   Diagnosis Date    Anxiety     Candidiasis of mouth     Last Assessed [id number]    Depression     Hypertension     Obesity     Sciatica     Last Assessed [id number]    Skin rash     Last Assessed [id number]    Urticaria     Last Assessed [id number]     Past Surgical History:   Procedure Laterality Date   [facility] [place] [facility]       Current Outpatient Medications   Medication Sig Dispense Refill    ALPRAZolam (XANAX) 0 5 mg tablet Take 1 tablet (0 5 mg total) by mouth 3 (three) times a day as needed (as neded) 90 tablet 0    citalopram (CeleXA) 40 mg tablet Take 1 tablet (40 mg total) by mouth daily 90 tablet 1    ibuprofen (MOTRIN) 200 mg tablet Take by mouth every 6 (six) hours as needed for mild pain      omeprazole (PriLOSEC OTC) 20 MG tablet Take 20 mg by mouth daily      VITAMIN D PO Take 1,000 Units by mouth daily       No current facility-administered medications for this visit  Allergies   Allergen Reactions    Pollen Extract Allergic Rhinitis       Review of Systems   Constitutional: Positive for fatigue  Negative for chills and fever  HENT: Positive for congestion and sore throat  Negative for ear pain, hearing loss, postnasal drip, rhinorrhea, sinus pressure, sinus pain and trouble swallowing  Eyes: Negative for pain and visual disturbance  Respiratory: Positive for cough and chest tightness  Negative for shortness of breath and wheezing  Cardiovascular: Negative  Negative for chest pain, palpitations and leg swelling  Gastrointestinal: Negative for abdominal pain, blood in stool, constipation, diarrhea, nausea and vomiting  Endocrine: Negative for cold intolerance, heat intolerance, polydipsia, polyphagia and polyuria  Genitourinary: Negative for difficulty urinating, dysuria, flank pain and urgency  Musculoskeletal: Negative for arthralgias, back pain, gait problem and myalgias  Skin: Negative for rash  Allergic/Immunologic: Negative  Neurological: Negative for dizziness, weakness, light-headedness and headaches  Hematological: Negative  Psychiatric/Behavioral: Negative for behavioral problems, dysphoric mood and sleep disturbance  The patient is not nervous/anxious        Objective:    Vitals:    01/05/21 1404 01/05/21 1405   BP: 112/74 100/76   Patient Position: Sitting Standing   Pulse: 70    Temp: 98 °F (36 7 °C)    SpO2: 96%    Weight: 73 5 kg (162 lb)    Height: 5' 3" (1 6 m)        Physical Exam  Constitutional:       Appearance: She is well-developed  She is ill-appearing  HENT:      Head: Normocephalic and atraumatic  Nose: Nose normal    Eyes:      Conjunctiva/sclera: Conjunctivae normal    Neck:      Musculoskeletal: Normal range of motion  Pulmonary:      Effort: Pulmonary effort is normal    Musculoskeletal: Normal range of motion  Neurological:      Mental Status: She is alert and oriented to person, place, and time  Psychiatric:         Behavior: Behavior normal          Thought Content: Thought content normal          Judgment: Judgment normal        VIRTUAL VISIT DISCLAIMER    Tiburcio Bassett acknowledges that she has consented to an online visit or consultation  She understands that the online visit is based solely on information provided by her, and that, in the absence of a face-to-face physical evaluation by the physician, the diagnosis she receives is both limited and provisional in terms of accuracy and completeness  This is not intended to replace a full medical face-to-face evaluation by the physician  Tiburcio Bassett understands and accepts these terms

## 2021-01-05 NOTE — LETTER
Dear Dr Adelso Robertson is interested in participating in the [facility] COVID-19 convalescent plasma collection program  Please see my attached note verifying eligibility  Sincerely,      Lindsey Sorto PA-C    Scott Menendez 39 y o  female   MRN: [id number]  1975    COVID-19 Convalescent Plasma Donor Attestation Verification of Eligibility     Scott Menendez is a 39 y o  female who was diagnosed with COVID-19 infection, has recovered from the illness and wishes to participate in the [facility] convalescent plasma donation program to treat critically ill COVID-19 patients  Donor understands that they will be screened by [address] [employer] and if deemed a suitable candidate, donation appointment time will be arranged directly through [address] [employer]  They were also informed that their plasma will be in a donor pool and cannot be directed to a specific patient  Patient tested positive for 2019 Novel Coronavirus 2019 (SARS-CoV-2 RNA) on ***      {Covid19 Plasma Donation - Symptoms:52080::"Patient is now recovered from COVID-19 and has been or will be symptom free for at least 28 days, effective ***/***/*** "}     To facilitate donation, eligibility form sent directly to [address] [employer]  Patients preferred contact number: ***    All of the patient's questions were answered via telephone  She is aware to call our office with any further questions or concerns as needed

## 2021-01-07 ENCOUNTER — TELEMEDICINE (OUTPATIENT)
Dept: INTERNAL MEDICINE CLINIC | Facility: CLINIC | Age: 46
End: 2021-01-07
Payer: COMMERCIAL

## 2021-01-07 VITALS
OXYGEN SATURATION: 98 % | WEIGHT: 162 LBS | TEMPERATURE: 96.2 F | DIASTOLIC BLOOD PRESSURE: 72 MMHG | SYSTOLIC BLOOD PRESSURE: 105 MMHG | HEART RATE: 68 BPM | HEIGHT: 63 IN | BODY MASS INDEX: 28.7 KG/M2

## 2021-01-07 DIAGNOSIS — U07.1 COVID-19: ICD-10-CM

## 2021-01-07 DIAGNOSIS — R05.9 COUGH: Primary | ICD-10-CM

## 2021-01-07 PROCEDURE — 99213 OFFICE O/P EST LOW 20 MIN: CPT | Performed by: PHYSICIAN ASSISTANT

## 2021-01-08 ENCOUNTER — TELEPHONE (OUTPATIENT)
Dept: INTERNAL MEDICINE CLINIC | Facility: CLINIC | Age: 46
End: 2021-01-08

## 2021-01-08 NOTE — LETTER
January 8, 2021     Patient: Austen Gentile   YOB: 1975   Date of Visit: 1/8/2021       To Whom it May Concern:    Dustin Nuñezdaija is under my professional care  She was seen in my office on 1/8/2021  She may return to work on 01/11/2021  If you have any questions or concerns, please don't hesitate to call           Sincerely,          Evy Parnell PA-C        CC: No Recipients

## 2021-01-08 NOTE — TELEPHONE ENCOUNTER
Pt called stating that a work note was suppose to be emailed to her yesterday  I don't see any note in her chart

## 2021-01-08 NOTE — PROGRESS NOTES
COVID-19 Virtual Visit     Assessment/Plan:    Problem List Items Addressed This Visit        Other    COVID-19      Other Visit Diagnoses     Cough    -  Primary    Relevant Orders    XR chest pa & lateral         Disposition:     I recommended continued isolation until at least 24 hours have passed since recovery defined as resolution of fever without the use of fever-reducing medications AND improvement in COVID symptoms AND 10 days have passed since onset of symptoms (or 10 days have passed since date of first positive viral diagnostic test for asymptomatic patients)  I have spent 15 minutes directly with the patient  Greater than 50% of this time was spent in counseling/coordination of care regarding: diagnostic results, prognosis, risks and benefits of treatment options, instructions for management, patient and family education, importance of treatment compliance, risk factor reductions and impressions  Encounter provider Lane Ty PA-C    Provider located at [address] [place] [phone]    Recent Visits  Date Type Provider Dept   01/07/21 Lamont Gonzalez PA-C [facility]   [facility]   01/03/21 Telemedicine Eyv Parnell PA-C [facility]   01/01/21 Telemedicine Evy Parnell PA-C [facility]   Showing recent visits within past 7 days and meeting all other requirements     Future Appointments  No visits were found meeting these conditions  Showing future appointments within next 150 days and meeting all other requirements      This virtual check-in was done via [employer] and patient was informed that this is a secure, HIPAA-compliant platform  She agrees to proceed      Patient agrees to participate in a virtual check in via telephone or video visit instead of presenting to the office to address urgent/immediate medical needs  Patient is aware this is a billable service  After connecting through [facility], the patient was identified by name and date of birth  Jake Vernon was informed that this was a telemedicine visit and that the exam was being conducted confidentially over secure lines  Jake Vernon acknowledged consent and understanding of privacy and security of the telemedicine visit  I informed the patient that I have reviewed her record in Epic and presented the opportunity for her to ask any questions regarding the visit today  The patient agreed to participate  Subjective:   Jake Vernon is a 39 y o  female who has been screened for COVID-19  Symptom change since last report: improving  Patient's symptoms include fatigue, cough and shortness of breath  Patient denies fever, chills, congestion, rhinorrhea, sore throat, chest tightness, abdominal pain, nausea, vomiting, diarrhea, myalgias and headaches  Denisse Covington has been staying home and has isolated themselves in her home  She is taking care to not share personal items and is cleaning all surfaces that are touched often, like counters, tabletops, and doorknobs using household cleaning sprays or wipes  She is wearing a mask when she leaves her room  Date of symptom onset: 12/16/2020  Date of positive COVID-19 PCR: 12/15/2020    Pt is noting an overall improvement  Her course has been long and slow  She is feeling relieved to have symptoms finally improving  Defers further virtual visits secondary to improvement   Will be returning to work 1/11/21    Lab Results   Component Value Date    SARSCOV2 Detected (A) 12/15/2020     Past Medical History:   Diagnosis Date    Anxiety     Candidiasis of mouth     Last Assessed [id number]    Depression     Hypertension     Obesity     Sciatica     Last Assessed [id number]    Skin rash     Last Assessed [id number]    Urticaria     Last Assessed [id number]     Past Surgical History:   Procedure Laterality Date    DENTAL SURGERY       Current Outpatient Medications   Medication Sig Dispense Refill    ALPRAZolam (XANAX) 0 5 mg tablet Take 1 tablet (0 5 mg total) by mouth 3 (three) times a day as needed (as neded) 90 tablet 0    citalopram (CeleXA) 40 mg tablet Take 1 tablet (40 mg total) by mouth daily 90 tablet 1    ibuprofen (MOTRIN) 200 mg tablet Take by mouth every 6 (six) hours as needed for mild pain      omeprazole (PriLOSEC OTC) 20 MG tablet Take 20 mg by mouth daily      VITAMIN D PO Take 1,000 Units by mouth daily       No current facility-administered medications for this visit  Allergies   Allergen Reactions    Pollen Extract Allergic Rhinitis       Review of Systems   Constitutional: Positive for fatigue  Negative for chills and fever  HENT: Negative for congestion, ear pain, hearing loss, postnasal drip, rhinorrhea, sinus pressure, sinus pain, sore throat and trouble swallowing  Eyes: Negative for pain and visual disturbance  Respiratory: Positive for cough and shortness of breath  Negative for chest tightness and wheezing  Cardiovascular: Negative  Negative for chest pain, palpitations and leg swelling  Gastrointestinal: Negative for abdominal pain, blood in stool, constipation, diarrhea, nausea and vomiting  Endocrine: Negative for cold intolerance, heat intolerance, polydipsia, polyphagia and polyuria  Genitourinary: Negative for difficulty urinating, dysuria, flank pain and urgency  Musculoskeletal: Negative for arthralgias, back pain, gait problem and myalgias  Skin: Negative for rash  Allergic/Immunologic: Negative  Neurological: Negative for dizziness, weakness, light-headedness and headaches  Hematological: Negative  Psychiatric/Behavioral: Negative for behavioral problems, dysphoric mood and sleep disturbance  The patient is not nervous/anxious        Objective:    Vitals:    01/07/21 1418   BP: 105/72   Pulse: 68   Temp: (!) 96 2 °F (35 7 °C)   SpO2: 98% Weight: 73 5 kg (162 lb)   Height: 5' 3" (1 6 m)       Physical Exam  Constitutional:       Appearance: She is well-developed  HENT:      Head: Normocephalic and atraumatic  Nose: Nose normal    Eyes:      Conjunctiva/sclera: Conjunctivae normal    Neck:      Musculoskeletal: Normal range of motion  Pulmonary:      Effort: Pulmonary effort is normal    Musculoskeletal: Normal range of motion  Neurological:      Mental Status: She is alert and oriented to person, place, and time  Psychiatric:         Behavior: Behavior normal          Thought Content: Thought content normal          Judgment: Judgment normal        VIRTUAL VISIT DISCLAIMER    Ton Biswas acknowledges that she has consented to an online visit or consultation  She understands that the online visit is based solely on information provided by her, and that, in the absence of a face-to-face physical evaluation by the physician, the diagnosis she receives is both limited and provisional in terms of accuracy and completeness  This is not intended to replace a full medical face-to-face evaluation by the physician  Camilo True understands and accepts these terms

## 2021-01-14 ENCOUNTER — APPOINTMENT (OUTPATIENT)
Dept: RADIOLOGY | Facility: CLINIC | Age: 46
End: 2021-01-14
Payer: COMMERCIAL

## 2021-01-14 DIAGNOSIS — R05.9 COUGH: ICD-10-CM

## 2021-01-14 PROCEDURE — 71046 X-RAY EXAM CHEST 2 VIEWS: CPT

## 2021-02-24 DIAGNOSIS — F33.1 RECURRENT MAJOR DEPRESSIVE EPISODES, MODERATE (HCC): ICD-10-CM

## 2021-02-24 RX ORDER — CITALOPRAM 40 MG/1
40 TABLET ORAL DAILY
Qty: 90 TABLET | Refills: 1 | Status: SHIPPED | OUTPATIENT
Start: 2021-02-24 | End: 2021-09-29 | Stop reason: SDUPTHER

## 2021-03-08 ENCOUNTER — TELEPHONE (OUTPATIENT)
Dept: PSYCHIATRY | Facility: CLINIC | Age: 46
End: 2021-03-08

## 2021-03-08 NOTE — TELEPHONE ENCOUNTER
Behavorial Health Outpatient Intake Questions    Referred by: PCP    Please advised interviewee that they need to answer all questions truthfully to allow for best care and any misrepresentations of information may affect their ability to be seen at this clinic   => Was this discussed? Yes     [facility]     Presenting Problem (in patient's words): ANXIETY AND DEPRESSION    Are there any developmental disabilities? ? If yes, can they speak to you on the phone? If they are too limited to speak to you on phone, refer out No    Are you taking any psychiatric medications? Yes    => If yes, who prescribes? If yes, CELEXA 40 mg, xanax 0 5 as needed  are they injectable medications? no    Does the patient have a language barrier or hearing impairment? No    Have you been treated at [facility] by a therapist or a doctor in the past? If yes, who? Yes    Has the patient been hospitalized for mental health? No   If yes, how long ago was last hospitalization and where was it? Do you actively use alcohol or marijuana or illegal substances? If yes, what and how much - refer out to Drug and alcohol treatment if use is excessive or daily use of illegal substances No concerns of substance abuse are reported  Do you have a community treatment team or ? No    Legal History-     Does the patient have any history of arrests, MCFP/prison time, or DUIs? No  If Yes-  1) What types of charges? 2) When were they last incarcerated? 3) Are they currently on parole or probation? Minor Child-    Who has custody of the child? Is there a custody agreement? If there is a custody agreement remind parent that they must bring a copy to the first appt or they will not be seen       Intake Team, please check with provider before scheduling if flags come up such as:  - complex case  - legal history (other than DUI)  - communication barrier concerns are present  - if, in your judgment, this needs further review    ACCEPTED as a patient   => Appointment Date: TBD    Referred Elsewhere? No    Name of Insurance Co: [facility] ID# [id number]  [id number] Phone #  If ins is primary or secondary  If patient is a minor, parents information such as Name, D  O B of guarantor

## 2021-03-12 ENCOUNTER — IMMUNIZATIONS (OUTPATIENT)
Dept: FAMILY MEDICINE CLINIC | Facility: HOSPITAL | Age: 46
End: 2021-03-12

## 2021-03-12 DIAGNOSIS — Z23 ENCOUNTER FOR IMMUNIZATION: Primary | ICD-10-CM

## 2021-03-12 PROCEDURE — 0011A SARS-COV-2 / COVID-19 MRNA VACCINE (MODERNA) 100 MCG: CPT

## 2021-03-12 PROCEDURE — 91301 SARS-COV-2 / COVID-19 MRNA VACCINE (MODERNA) 100 MCG: CPT

## 2021-04-07 ENCOUNTER — PATIENT MESSAGE (OUTPATIENT)
Dept: INTERNAL MEDICINE CLINIC | Facility: CLINIC | Age: 46
End: 2021-04-07

## 2021-04-07 ENCOUNTER — APPOINTMENT (OUTPATIENT)
Dept: LAB | Facility: CLINIC | Age: 46
End: 2021-04-07
Payer: COMMERCIAL

## 2021-04-07 ENCOUNTER — TRANSCRIBE ORDERS (OUTPATIENT)
Dept: LAB | Facility: CLINIC | Age: 46
End: 2021-04-07

## 2021-04-07 ENCOUNTER — OFFICE VISIT (OUTPATIENT)
Dept: INTERNAL MEDICINE CLINIC | Facility: CLINIC | Age: 46
End: 2021-04-07
Payer: COMMERCIAL

## 2021-04-07 VITALS
RESPIRATION RATE: 14 BRPM | DIASTOLIC BLOOD PRESSURE: 80 MMHG | HEART RATE: 52 BPM | SYSTOLIC BLOOD PRESSURE: 120 MMHG | OXYGEN SATURATION: 99 % | HEIGHT: 63 IN | WEIGHT: 168 LBS | TEMPERATURE: 97.6 F | BODY MASS INDEX: 29.77 KG/M2

## 2021-04-07 DIAGNOSIS — F33.1 RECURRENT MAJOR DEPRESSIVE EPISODES, MODERATE (HCC): ICD-10-CM

## 2021-04-07 DIAGNOSIS — Z12.31 ENCOUNTER FOR SCREENING MAMMOGRAM FOR MALIGNANT NEOPLASM OF BREAST: ICD-10-CM

## 2021-04-07 DIAGNOSIS — R26.89 BALANCE DISORDER: ICD-10-CM

## 2021-04-07 DIAGNOSIS — R53.1 WEAKNESS: ICD-10-CM

## 2021-04-07 DIAGNOSIS — Z00.8 HEALTH EXAMINATION IN POPULATION SURVEY: ICD-10-CM

## 2021-04-07 DIAGNOSIS — M54.2 NECK PAIN: Primary | ICD-10-CM

## 2021-04-07 DIAGNOSIS — R41.3 MEMORY LOSS: ICD-10-CM

## 2021-04-07 DIAGNOSIS — Z00.8 HEALTH EXAMINATION IN POPULATION SURVEY: Primary | ICD-10-CM

## 2021-04-07 DIAGNOSIS — R41.3 MEMORY LOSS: Primary | ICD-10-CM

## 2021-04-07 DIAGNOSIS — M54.2 NECK PAIN: ICD-10-CM

## 2021-04-07 PROBLEM — Z86.16 HISTORY OF COVID-19: Status: ACTIVE | Noted: 2020-12-18

## 2021-04-07 LAB
CRP SERPL QL: <3 MG/L
EST. AVERAGE GLUCOSE BLD GHB EST-MCNC: 100 MG/DL
HBA1C MFR BLD: 5.1 %

## 2021-04-07 PROCEDURE — 86140 C-REACTIVE PROTEIN: CPT

## 2021-04-07 PROCEDURE — 99214 OFFICE O/P EST MOD 30 MIN: CPT | Performed by: PHYSICIAN ASSISTANT

## 2021-04-07 PROCEDURE — 83036 HEMOGLOBIN GLYCOSYLATED A1C: CPT

## 2021-04-07 PROCEDURE — 86618 LYME DISEASE ANTIBODY: CPT

## 2021-04-07 PROCEDURE — 84165 PROTEIN E-PHORESIS SERUM: CPT | Performed by: PATHOLOGY

## 2021-04-07 PROCEDURE — 36415 COLL VENOUS BLD VENIPUNCTURE: CPT

## 2021-04-07 PROCEDURE — 84165 PROTEIN E-PHORESIS SERUM: CPT

## 2021-04-07 PROCEDURE — 83519 RIA NONANTIBODY: CPT

## 2021-04-07 PROCEDURE — 84238 ASSAY NONENDOCRINE RECEPTOR: CPT

## 2021-04-07 NOTE — ASSESSMENT & PLAN NOTE
Pt with memory loss, weakness, balance disturbance and tremor  No significant neurologic family hsitory  Will get MRI brain  Will check lyme, CRP, SPEP, and myasthenia panel

## 2021-04-07 NOTE — PROGRESS NOTES
Assessment/Plan:  Problem List Items Addressed This Visit        Other    Recurrent major depressive episodes, moderate (HCC)     Symptoms worsening  Denies SI/HI  Continue celexa  Pt has appt with psychiatry next week  Memory loss     Pt with memory loss, weakness, balance disturbance and tremor  No significant neurologic family hsitory  Will get MRI brain  Will check lyme, CRP, SPEP, and myasthenia panel  Relevant Orders    MRI brain wo contrast    Protein electrophoresis, serum    Myasthenia Gravis Panel 1    Lyme Antibody Profile with reflex to WB    MRI brain w wo contrast    C-reactive protein    Neck pain - Primary     Will get xray and refer to PT  Relevant Orders    XR spine cervical complete 4 or 5 vw non injury    Ambulatory referral to Physical Therapy    C-reactive protein    Balance disorder    Relevant Orders    MRI brain wo contrast    Protein electrophoresis, serum    Myasthenia Gravis Panel 1    Lyme Antibody Profile with reflex to WB    MRI brain w wo contrast    C-reactive protein    Weakness    Relevant Orders    MRI brain wo contrast    Protein electrophoresis, serum    Myasthenia Gravis Panel 1    Lyme Antibody Profile with reflex to WB    MRI brain w wo contrast    C-reactive protein      Other Visit Diagnoses     Encounter for screening mammogram for malignant neoplasm of breast        Relevant Orders    Mammo screening bilateral w 3d & cad           Diagnoses and all orders for this visit:    Neck pain  -     XR spine cervical complete 4 or 5 vw non injury; Future  -     Ambulatory referral to Physical Therapy; Future  -     C-reactive protein; Future    Encounter for screening mammogram for malignant neoplasm of breast  -     Mammo screening bilateral w 3d & cad; Future    Memory loss  -     MRI brain wo contrast; Future  -     Protein electrophoresis, serum; Future  -     Myasthenia Gravis Panel 1; Future  -     Lyme Antibody Profile with reflex to WB;  Future  - MRI brain w wo contrast; Future  -     C-reactive protein; Future    Balance disorder  -     MRI brain wo contrast; Future  -     Protein electrophoresis, serum; Future  -     Myasthenia Gravis Panel 1; Future  -     Lyme Antibody Profile with reflex to WB; Future  -     MRI brain w wo contrast; Future  -     C-reactive protein; Future    Weakness  -     MRI brain wo contrast; Future  -     Protein electrophoresis, serum; Future  -     Myasthenia Gravis Panel 1; Future  -     Lyme Antibody Profile with reflex to WB; Future  -     MRI brain w wo contrast; Future  -     C-reactive protein; Future    Recurrent major depressive episodes, moderate (HCC)        Neck pain  Will get xray and refer to PT  Memory loss  Pt with memory loss, weakness, balance disturbance and tremor  No significant neurologic family hsitory  Will get MRI brain  Will check lyme, CRP, SPEP, and myasthenia panel  Recurrent major depressive episodes, moderate (HCC)  Symptoms worsening  Denies SI/HI  Continue celexa  Pt has appt with psychiatry next week  Subjective:      Patient ID: Ericka Julian is a 39 y o  female  Pt presents for appt with multiple concerns  She relates muscle weakness, balance disturbance, muscle aches, memory loss and tremor  She states many of these symptoms have been present for several months but feels they have worsened  She notes symptoms prior to Covid but since covid they have worsened  Denies headaches or visual issues  She often feels like she has to hold on to things to prevent falling  She feels clumsy and like she drops things a lot  She relates neck pain with some numbness and tingling  Denies injury or symptoms radiating into the upper extremities  She relates worsening depression and anxiety partially due to being worried about a potential significant diagnosis being associated with her symptoms  She has a psychiatry appt next week         The following portions of the patient's history were reviewed and updated as appropriate:   She has a past medical history of Anxiety, Candidiasis of mouth, Depression, Hypertension, Memory loss (4/7/2021), Obesity, Sciatica, Skin rash, and Urticaria ,  does not have any pertinent problems on file  ,   has a past surgical history that includes Dental surgery  ,  family history includes Cancer in her family and father; Depression in her mother; Diabetes in her family; Heart disease in her family; Hypertension in her father; Osteoporosis in her mother; Stroke in her family; Testicular cancer in her father  ,   reports that she has never smoked  She has never used smokeless tobacco  She reports current alcohol use of about 3 0 standard drinks of alcohol per week  She reports that she does not use drugs  ,  is allergic to pollen extract     Current Outpatient Medications   Medication Sig Dispense Refill    ALPRAZolam (XANAX) 0 5 mg tablet Take 1 tablet (0 5 mg total) by mouth 3 (three) times a day as needed (as neded) 90 tablet 0    citalopram (CeleXA) 40 mg tablet Take 1 tablet (40 mg total) by mouth daily 90 tablet 1    ibuprofen (MOTRIN) 200 mg tablet Take by mouth every 6 (six) hours as needed for mild pain      omeprazole (PriLOSEC OTC) 20 MG tablet Take 20 mg by mouth daily      VITAMIN D PO Take 1,000 Units by mouth daily       No current facility-administered medications for this visit  Review of Systems   Constitutional: Positive for fatigue  Negative for chills and fever  HENT: Negative for congestion, ear pain, hearing loss, postnasal drip, rhinorrhea, sinus pressure, sinus pain, sore throat and trouble swallowing  Eyes: Negative for pain and visual disturbance  Respiratory: Negative for cough, chest tightness, shortness of breath and wheezing  Cardiovascular: Negative  Negative for chest pain, palpitations and leg swelling  Gastrointestinal: Negative for abdominal pain, blood in stool, constipation, diarrhea, nausea and vomiting  Endocrine: Negative for cold intolerance, heat intolerance, polydipsia, polyphagia and polyuria  Genitourinary: Negative for difficulty urinating, dysuria, flank pain and urgency  Musculoskeletal: Positive for gait problem and neck pain  Negative for arthralgias, back pain and myalgias  Skin: Negative for rash  Allergic/Immunologic: Negative  Neurological: Positive for tremors and weakness  Negative for dizziness, light-headedness and headaches  Hematological: Negative  Psychiatric/Behavioral: Positive for decreased concentration and dysphoric mood  Negative for behavioral problems and sleep disturbance  The patient is nervous/anxious  PHQ-9 Depression Screening    PHQ-9:   Frequency of the following problems over the past two weeks:              Objective:  Vitals:    04/07/21 0953   BP: 120/80   Pulse: (!) 52   Resp: 14   Temp: 97 6 °F (36 4 °C)   TempSrc: Tympanic   SpO2: 99%   Weight: 76 2 kg (168 lb)   Height: 5' 3" (1 6 m)     Body mass index is 29 76 kg/m²  Physical Exam  Nursing note reviewed  Constitutional:       General: She is not in acute distress  Appearance: She is well-developed  She is not diaphoretic  HENT:      Head: Normocephalic and atraumatic  Right Ear: External ear normal       Left Ear: External ear normal       Nose: Nose normal       Mouth/Throat:      Pharynx: No oropharyngeal exudate  Eyes:      General: No scleral icterus  Right eye: No discharge  Left eye: No discharge  Conjunctiva/sclera: Conjunctivae normal       Pupils: Pupils are equal, round, and reactive to light  Neck:      Musculoskeletal: Normal range of motion and neck supple  Thyroid: No thyromegaly  Cardiovascular:      Rate and Rhythm: Normal rate and regular rhythm  Heart sounds: Normal heart sounds  No murmur  No friction rub  No gallop  Pulmonary:      Effort: Pulmonary effort is normal  No respiratory distress        Breath sounds: Normal breath sounds  No wheezing or rales  Abdominal:      General: Bowel sounds are normal  There is no distension  Palpations: Abdomen is soft  Tenderness: There is no abdominal tenderness  Musculoskeletal: Normal range of motion  General: No tenderness or deformity  Cervical back: She exhibits pain  Skin:     General: Skin is warm and dry  Neurological:      Mental Status: She is alert and oriented to person, place, and time  Cranial Nerves: No cranial nerve deficit  Motor: Weakness and tremor present  Psychiatric:         Mood and Affect: Mood is anxious and depressed  Affect is tearful  Behavior: Behavior normal          Thought Content: Thought content normal          Judgment: Judgment normal        BMI Counseling: Body mass index is 29 76 kg/m²  The BMI is above normal  Nutrition recommendations include decreasing portion sizes, consuming healthier snacks and limiting drinks that contain sugar

## 2021-04-08 LAB
ALBUMIN SERPL ELPH-MCNC: 4.33 G/DL (ref 3.5–5)
ALBUMIN SERPL ELPH-MCNC: 61 % (ref 52–65)
ALPHA1 GLOB SERPL ELPH-MCNC: 0.31 G/DL (ref 0.1–0.4)
ALPHA1 GLOB SERPL ELPH-MCNC: 4.4 % (ref 2.5–5)
ALPHA2 GLOB SERPL ELPH-MCNC: 0.68 G/DL (ref 0.4–1.2)
ALPHA2 GLOB SERPL ELPH-MCNC: 9.6 % (ref 7–13)
B BURGDOR IGG+IGM SER-ACNC: 17
BETA GLOB ABNORMAL SERPL ELPH-MCNC: 0.37 G/DL (ref 0.4–0.8)
BETA1 GLOB SERPL ELPH-MCNC: 5.2 % (ref 5–13)
BETA2 GLOB SERPL ELPH-MCNC: 4.4 % (ref 2–8)
BETA2+GAMMA GLOB SERPL ELPH-MCNC: 0.31 G/DL (ref 0.2–0.5)
GAMMA GLOB ABNORMAL SERPL ELPH-MCNC: 1.09 G/DL (ref 0.5–1.6)
GAMMA GLOB SERPL ELPH-MCNC: 15.4 % (ref 12–22)
IGG/ALB SER: 1.56 {RATIO} (ref 1.1–1.8)
PROT PATTERN SERPL ELPH-IMP: ABNORMAL
PROT SERPL-MCNC: 7.1 G/DL (ref 6.4–8.2)

## 2021-04-13 LAB
ACHR BIND AB SER-SCNC: <0.03 NMOL/L (ref 0–0.24)
ACHR BLOCK AB/ACHR TOTAL SFR SER: 9 % (ref 0–25)

## 2021-04-14 ENCOUNTER — IMMUNIZATIONS (OUTPATIENT)
Dept: FAMILY MEDICINE CLINIC | Facility: HOSPITAL | Age: 46
End: 2021-04-14

## 2021-04-14 DIAGNOSIS — Z23 ENCOUNTER FOR IMMUNIZATION: Primary | ICD-10-CM

## 2021-04-14 LAB — ACHR MOD AB/ACHR TOTAL SFR SER: <12 % (ref 0–20)

## 2021-04-14 PROCEDURE — 91301 SARS-COV-2 / COVID-19 MRNA VACCINE (MODERNA) 100 MCG: CPT

## 2021-04-14 PROCEDURE — 0012A SARS-COV-2 / COVID-19 MRNA VACCINE (MODERNA) 100 MCG: CPT

## 2021-04-16 ENCOUNTER — SOCIAL WORK (OUTPATIENT)
Dept: BEHAVIORAL/MENTAL HEALTH CLINIC | Facility: CLINIC | Age: 46
End: 2021-04-16
Payer: COMMERCIAL

## 2021-04-16 ENCOUNTER — HOSPITAL ENCOUNTER (OUTPATIENT)
Dept: MRI IMAGING | Facility: HOSPITAL | Age: 46
Discharge: HOME | End: 2021-04-16
Payer: COMMERCIAL

## 2021-04-16 DIAGNOSIS — R53.1 WEAKNESS: ICD-10-CM

## 2021-04-16 DIAGNOSIS — F33.1 RECURRENT MAJOR DEPRESSIVE EPISODES, MODERATE (HCC): Primary | ICD-10-CM

## 2021-04-16 DIAGNOSIS — R26.89 BALANCE DISORDER: ICD-10-CM

## 2021-04-16 DIAGNOSIS — R41.3 MEMORY LOSS: ICD-10-CM

## 2021-04-16 PROCEDURE — A9585 GADOBUTROL INJECTION: HCPCS | Performed by: PHYSICIAN ASSISTANT

## 2021-04-16 PROCEDURE — G1004 CDSM NDSC: HCPCS

## 2021-04-16 PROCEDURE — 90791 PSYCH DIAGNOSTIC EVALUATION: CPT | Performed by: SOCIAL WORKER

## 2021-04-16 PROCEDURE — 70553 MRI BRAIN STEM W/O & W/DYE: CPT

## 2021-04-16 RX ADMIN — GADOBUTROL 7.5 ML: 604.72 INJECTION INTRAVENOUS at 19:30
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
## 2021-04-16 NOTE — BH TREATMENT PLAN
Keenan Boothe  1975       Date of Initial Treatment Plan: 4/16/21  Date of Current Treatment Plan: 04/16/21    Treatment Plan Number Initial (1)     Strengths/Personal Resources for Self Care: Very kind and caring, loves animals (rescues), very goal oriented    Diagnosis:   1  Recurrent major depressive episodes, moderate ([facility] [address] )         Area of Needs:  Anxiety/"Get over the past"      Long Term Goal 1: "I would like to feel at peace"    Target Date: 9/16/21  Completion Date: To be determined by therapist and Debby Sick Term Objectives for Goal 1:        1  Tamikamelida Kelsie will come to therapy and process thoughts and feelings       2  Bonifacio Iglesias will identify triggers for anxiety and learn at least 3 skills for coping with it       3  Bonifacio Iglesias will learn about the influence of thoughts on mood - and work to replace thoughts that are supporting the presence of symptoms       4  Bonifacio Iglesias will consider how her experience of growing up in family of origin affects her functioning today and reprocess as necessary       5  Bonifacio Iglesias will explore current stressors and problem solve as she is able       6  Bonifacio Iglesias will continue to work with PCP for purposes of med management     Responsible:  Bonifacio Iglesias and Therapist      GOAL 1: Modality: Individual/weekly/client centered, CBT, mindfulness, trauma informed, solution focused      [address]: Diagnosis and Treatment Plan explained to Lul Natarajan relates understanding diagnosis and is agreeable to Treatment Plan   YES      Client Comments : Please share your thoughts, feelings, need and/or experiences regarding your treatment plan: Bonifacio Iglesias gave verbal consent for this plan due to COVID restrictions

## 2021-04-20 ENCOUNTER — SOCIAL WORK (OUTPATIENT)
Dept: BEHAVIORAL/MENTAL HEALTH CLINIC | Facility: CLINIC | Age: 46
End: 2021-04-20
Payer: COMMERCIAL

## 2021-04-20 DIAGNOSIS — F33.1 RECURRENT MAJOR DEPRESSIVE EPISODES, MODERATE (HCC): Primary | ICD-10-CM

## 2021-04-20 PROCEDURE — 90834 PSYTX W PT 45 MINUTES: CPT | Performed by: SOCIAL WORKER
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
## 2021-05-10 ENCOUNTER — SOCIAL WORK (OUTPATIENT)
Dept: BEHAVIORAL/MENTAL HEALTH CLINIC | Facility: CLINIC | Age: 46
End: 2021-05-10
Payer: COMMERCIAL

## 2021-05-10 DIAGNOSIS — F33.1 RECURRENT MAJOR DEPRESSIVE EPISODES, MODERATE (HCC): Primary | ICD-10-CM

## 2021-05-10 PROCEDURE — 90834 PSYTX W PT 45 MINUTES: CPT | Performed by: SOCIAL WORKER

## 2021-05-10 NOTE — PSYCH
Psychotherapy Provided: Individual Psychotherapy 50 minutes     Length of time in session: 50 minutes, follow up in 2 weeks    Encounter Diagnosis     ICD-10-CM    1  Recurrent major depressive episodes, moderate ([facility] [address] )  F33 1        Goals addressed in session: Goal 1     Data:  Chu Shah presents today stating that she had a surprisingly good day with her mother yesterday on Mother's Day  She feels guilty now for sharing in session last time that her mother was "horrible" and invalidating  Therapist balanced this concern by pointing out that Chu Shah was engaging in black and white thinking, ie  This therapist recognizes that people are not all bad or all good  Therapist urged Chu Shah to consider how she can be a caring daughter while simultaneously intolerant of her mother's poor treatment/not believing what her mother's believes aobut her  Therapist then revisited the CBT triangle and assisted Chu Shah to examine several of her thoughts (e g  "I am a complete idiot"  "I am a bad daughter  ") with her associated experience of anxiety  We considered if there was evidence for what she was thinking and considered what thoughts she might substitute  Chu Shah concluded the session by 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 in her household, and what she has come to believe about herself  Chu Shah admits that her self-esteem is low, though she coming to recognize that she needs to accept herself as she is  Chu Shah reports that her MRI last week came back normal   She has continued concerns around forgetfulness/muscle tightness/being "scatter brained "  Therapist validated Brandie's experience and recommended that she look into Arpan Noam and [facility]  Assessment:  Brandie's mood is anxious    She confides that she is anxious about coming to session because she is afraid she will be judged "as an idiot "  She fears that someone in the Network will read her chart even though she recognizes that her chart is protected by "glass "  Brandie's thoughts are otherwise logical and goal oriented  But she maintains very self critical thoughts about herself  She is cooperative today and engages easily  She denies thoughts of self harm/SI/HI  Plan:  Freddrick Aschoff will return for a follow up in two weeks  We will revisit cognitive distortions and in particular talk about coping with anxiety  Pain:      none    0    Current suicide risk : Low         Behavioral Health Treatment Plan [facility]: Diagnosis and Treatment Plan explained to Radha Wang relates understanding diagnosis and is agreeable to Treatment Plan   YES

## 2021-06-02 ENCOUNTER — SOCIAL WORK (OUTPATIENT)
Dept: BEHAVIORAL/MENTAL HEALTH CLINIC | Facility: CLINIC | Age: 46
End: 2021-06-02
Payer: COMMERCIAL

## 2021-06-02 DIAGNOSIS — F33.1 RECURRENT MAJOR DEPRESSIVE EPISODES, MODERATE (HCC): Primary | ICD-10-CM

## 2021-06-02 PROCEDURE — 90834 PSYTX W PT 45 MINUTES: CPT | Performed by: SOCIAL WORKER

## 2021-06-03 NOTE — PSYCH
This note was not shared with the patient due to this is a psychotherapy note  Psychotherapy Provided: Individual Psychotherapy 50 minutes     Length of time in session: 50 minutes, follow up in 1 week    Encounter Diagnosis     ICD-10-CM    1  Recurrent major depressive episodes, moderate ([facility] [address] )  F33 1        Goals addressed in session: Goal 1     Data:  Annabella Stevens arrives today in session stating that she is very tired  She got home late last night because her little dog removed the sutures from her recent surgery  Annabella Stevens had to [facility]  Annabella Stevens is scared that the cancer has spread through her body; she had her toe amputated last week  Annabella Stevens states that she has 5 dogs and 3 cats, and they are like her children  She adds that her  does not feel as strongly about them and often invalidates Brandie's strong commitment to them  This leaves her feeling sad  Using CBT interventions, therapist assisted Annabella Stevens to further explore her feelings about her  with whom she has been for 17 years  She shared that he cheated on her in 2016, and this hurt her greatly  She states that she will never trust him fully again  She adds that he wants sex all the time, and she does not feel emotionally connected enough with him to enjoy it  She states "I don't like to be touched by him "  Therapist used the miracle question and asked Annabella Stevens what it would take for her to feel better about sex  She reports that she needs to feel that her  respects her and validates her  Annabella Stevens adds that she has discussed this with him repeatedly and "he doesn't get it "  She states that they always wind up fighting  Annabella Stevens reports that she has accepted that he can't give her what she wants most, so she has been throwing herself into her job from which she gains a lot of satisfaction - also her dogs  Assessment:  Brandie's mood is depressed  Her affect is congruent    She engages cooperatively with therapist  Her thoughts are logical and goal oriented  She denies SI/HI  Denia Salo is growing in insight into what beliefs may be underlying her depression  She is disappointed by her relationships (from parents to her ) - and seems to be taking steps to find satisfaction/jack in other ways  Plan:  Denia Leong will return in one week  We will revisit beliefs and focus on identifying small, reasonable goals for realizing more jack  Pain:      none    0    Current suicide risk : Low         Behavioral Health Treatment Plan [facility]: Diagnosis and Treatment Plan explained to Claudio More relates understanding diagnosis and is agreeable to Treatment Plan   YES

## 2021-06-15 ENCOUNTER — SOCIAL WORK (OUTPATIENT)
Dept: BEHAVIORAL/MENTAL HEALTH CLINIC | Facility: CLINIC | Age: 46
End: 2021-06-15
Payer: COMMERCIAL

## 2021-06-15 DIAGNOSIS — F33.1 RECURRENT MAJOR DEPRESSIVE EPISODES, MODERATE (HCC): Primary | ICD-10-CM

## 2021-06-15 PROCEDURE — 90834 PSYTX W PT 45 MINUTES: CPT | Performed by: SOCIAL WORKER
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
## 2021-06-25 ENCOUNTER — TELEPHONE (OUTPATIENT)
Dept: INTERNAL MEDICINE CLINIC | Facility: CLINIC | Age: 46
End: 2021-06-25

## 2021-06-25 ENCOUNTER — OFFICE VISIT (OUTPATIENT)
Dept: INTERNAL MEDICINE CLINIC | Facility: CLINIC | Age: 46
End: 2021-06-25
Payer: COMMERCIAL

## 2021-06-25 VITALS
HEIGHT: 63 IN | OXYGEN SATURATION: 98 % | TEMPERATURE: 97.5 F | DIASTOLIC BLOOD PRESSURE: 78 MMHG | BODY MASS INDEX: 29.62 KG/M2 | SYSTOLIC BLOOD PRESSURE: 118 MMHG | WEIGHT: 167.2 LBS | HEART RATE: 88 BPM

## 2021-06-25 DIAGNOSIS — J01.10 ACUTE NON-RECURRENT FRONTAL SINUSITIS: Primary | ICD-10-CM

## 2021-06-25 PROCEDURE — 99213 OFFICE O/P EST LOW 20 MIN: CPT | Performed by: PHYSICIAN ASSISTANT

## 2021-06-25 RX ORDER — AMOXICILLIN AND CLAVULANATE POTASSIUM 875; 125 MG/1; MG/1
1 TABLET, FILM COATED ORAL EVERY 12 HOURS SCHEDULED
Qty: 14 TABLET | Refills: 0 | Status: SHIPPED | OUTPATIENT
Start: 2021-06-25 | End: 2021-07-02

## 2021-06-25 NOTE — LETTER
June 25, 2021     Patient: Kiera Hill   YOB: 1975   Date of Visit: 6/25/2021       To Whom it May Concern:    Belgica Santo is under my professional care  She was seen in my office on 6/25/2021  She may return to work on 6/28/21  If you have any questions or concerns, please don't hesitate to call           Sincerely,          Evy Parnell PA-C        CC: No Recipients

## 2021-06-25 NOTE — TELEPHONE ENCOUNTER
Pt  Asking if the Amoxicillin - clabulanante  Be rereouted to [employer], [place] [id number] [facility]  She doesn't feel well to go to [place]      ty

## 2021-06-25 NOTE — PROGRESS NOTES
Assessment/Plan:  Problem List Items Addressed This Visit        Respiratory    Acute non-recurrent frontal sinusitis - Primary     Start augmentin  Directions for use and possible side effects discussed and patient verbalized understanding of these  The patient was instructed to change their toothbrush to avoid reinfection  Relevant Medications    amoxicillin-clavulanate (AUGMENTIN) 875-125 mg per tablet           Diagnoses and all orders for this visit:    Acute non-recurrent frontal sinusitis  -     amoxicillin-clavulanate (AUGMENTIN) 875-125 mg per tablet; Take 1 tablet by mouth every 12 (twelve) hours for 7 days        Acute non-recurrent frontal sinusitis  Start augmentin  Directions for use and possible side effects discussed and patient verbalized understanding of these  The patient was instructed to change their toothbrush to avoid reinfection  Subjective:      Patient ID: Jessica Hua is a 42 y o  female  Sinus Problem  This is a new problem  The current episode started in the past 7 days  The problem is unchanged  There has been no fever  The pain is moderate  Associated symptoms include congestion, coughing, headaches, sinus pressure and a sore throat  Pertinent negatives include no chills, diaphoresis, ear pain, hoarse voice, neck pain or shortness of breath  Past treatments include nothing  The treatment provided mild relief  The following portions of the patient's history were reviewed and updated as appropriate:   She has a past medical history of Anxiety, Candidiasis of mouth, Depression, Hypertension, Memory loss (4/7/2021), Obesity, Sciatica, Skin rash, and Urticaria ,  does not have any pertinent problems on file  ,   has a past surgical history that includes Dental surgery  ,  family history includes Cancer in her family and father; Depression in her mother; Diabetes in her family; Heart disease in her family;  Hypertension in her father; Osteoporosis in her mother; Stroke in her family; Testicular cancer in her father  ,   reports that she has never smoked  She has never used smokeless tobacco  She reports current alcohol use of about 3 0 standard drinks of alcohol per week  She reports that she does not use drugs  ,  is allergic to pollen extract     Current Outpatient Medications   Medication Sig Dispense Refill    ALPRAZolam (XANAX) 0 5 mg tablet Take 1 tablet (0 5 mg total) by mouth 3 (three) times a day as needed (as neded) 90 tablet 0    citalopram (CeleXA) 40 mg tablet Take 1 tablet (40 mg total) by mouth daily 90 tablet 1    ibuprofen (MOTRIN) 200 mg tablet Take by mouth every 6 (six) hours as needed for mild pain      omeprazole (PriLOSEC OTC) 20 MG tablet Take 20 mg by mouth daily      VITAMIN D PO Take 1,000 Units by mouth daily      amoxicillin-clavulanate (AUGMENTIN) 875-125 mg per tablet Take 1 tablet by mouth every 12 (twelve) hours for 7 days 14 tablet 0     No current facility-administered medications for this visit  Review of Systems   Constitutional: Negative for chills, diaphoresis and fever  HENT: Positive for congestion, sinus pressure and sore throat  Negative for ear pain, hearing loss, hoarse voice, postnasal drip, rhinorrhea, sinus pain and trouble swallowing  Eyes: Negative for pain and visual disturbance  Respiratory: Positive for cough  Negative for chest tightness, shortness of breath and wheezing  Cardiovascular: Negative  Negative for chest pain, palpitations and leg swelling  Gastrointestinal: Negative for abdominal pain, blood in stool, constipation, diarrhea, nausea and vomiting  Endocrine: Negative for cold intolerance, heat intolerance, polydipsia, polyphagia and polyuria  Genitourinary: Negative for difficulty urinating, dysuria, flank pain and urgency  Musculoskeletal: Negative for arthralgias, back pain, gait problem, myalgias and neck pain  Skin: Negative for rash  Allergic/Immunologic: Negative  Neurological: Positive for headaches  Negative for dizziness, weakness and light-headedness  Hematological: Negative  Psychiatric/Behavioral: Negative for behavioral problems, dysphoric mood and sleep disturbance  The patient is not nervous/anxious  PHQ-9 Depression Screening    PHQ-9:   Frequency of the following problems over the past two weeks:              Objective:  Vitals:    06/25/21 0915   BP: 118/78   BP Location: Left arm   Pulse: 88   Temp: 97 5 °F (36 4 °C)   TempSrc: Temporal   SpO2: 98%   Weight: 75 8 kg (167 lb 3 2 oz)   Height: 5' 3" (1 6 m)     Body mass index is 29 62 kg/m²  Physical Exam  Constitutional:       General: She is not in acute distress  Appearance: She is well-developed  She is ill-appearing  She is not diaphoretic  HENT:      Head: Normocephalic and atraumatic  Right Ear: External ear normal       Left Ear: External ear normal       Nose: Congestion and rhinorrhea present  Right Turbinates: Swollen  Left Turbinates: Swollen  Mouth/Throat:      Pharynx: No oropharyngeal exudate  Eyes:      General: No scleral icterus  Right eye: No discharge  Left eye: No discharge  Conjunctiva/sclera: Conjunctivae normal       Pupils: Pupils are equal, round, and reactive to light  Neck:      Thyroid: No thyromegaly  Cardiovascular:      Rate and Rhythm: Normal rate and regular rhythm  Heart sounds: Normal heart sounds  No murmur heard  No friction rub  No gallop  Pulmonary:      Effort: Pulmonary effort is normal  No respiratory distress  Breath sounds: Normal breath sounds  No wheezing or rales  Abdominal:      General: Bowel sounds are normal  There is no distension  Palpations: Abdomen is soft  Tenderness: There is no abdominal tenderness  Musculoskeletal:         General: No tenderness or deformity  Normal range of motion  Cervical back: Normal range of motion and neck supple     Skin: General: Skin is warm and dry  Neurological:      Mental Status: She is alert and oriented to person, place, and time  Cranial Nerves: No cranial nerve deficit  Psychiatric:         Behavior: Behavior normal          Thought Content:  Thought content normal          Judgment: Judgment normal

## 2021-07-01 ENCOUNTER — SOCIAL WORK (OUTPATIENT)
Dept: BEHAVIORAL/MENTAL HEALTH CLINIC | Facility: CLINIC | Age: 46
End: 2021-07-01
Payer: COMMERCIAL

## 2021-07-01 DIAGNOSIS — F33.1 RECURRENT MAJOR DEPRESSIVE EPISODES, MODERATE (HCC): Primary | ICD-10-CM

## 2021-07-01 PROCEDURE — 90834 PSYTX W PT 45 MINUTES: CPT | Performed by: SOCIAL WORKER
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
## 2021-07-08 ENCOUNTER — SOCIAL WORK (OUTPATIENT)
Dept: BEHAVIORAL/MENTAL HEALTH CLINIC | Facility: CLINIC | Age: 46
End: 2021-07-08
Payer: COMMERCIAL

## 2021-07-08 DIAGNOSIS — F33.1 RECURRENT MAJOR DEPRESSIVE EPISODES, MODERATE (HCC): Primary | ICD-10-CM

## 2021-07-08 PROCEDURE — 90834 PSYTX W PT 45 MINUTES: CPT | Performed by: SOCIAL WORKER

## 2021-07-09 NOTE — PSYCH
Psychotherapy Provided: Individual Psychotherapy 50 minutes     Length of time in session: 50 minutes, follow up in 1 week    Encounter Diagnosis     ICD-10-CM    1  Recurrent major depressive episodes, moderate ([facility] [address] )  F33 1        Goals addressed in session: Goal 1     Data:  Jovani Ybarra shared that she remains largely depressed  She states that this time of the year is especially depressing as she knows that the fall and winter are coming  She does not like the cold, dark weather  Therapist recommended that she look into buying a lamp that simulates daylight and grow some plants over the winter months  She agreed  Today therapist focused on Brandie's admission during last session that she is an [facility] or Highly Sensitive Person  Therapist provided psycho-ed regarding the characteristics of individuals with this temperament and encouraged Jovani Ybarra to view these qualities as strengths  Jovani Ybarra confides that she considers it "a curse" and she tearfully shares that she is "tormented" by the thoughts in her head of death, the meaning of life, suffering  Therapist reassured her that these are "normal" given the fact that she seems to have higher than average intelligence  Therapist recommended that she read the book Quiet and she explore further what it means to be an introvert as Jovani Ybarra shares that she prefers to be alone  Therapist concluded by providing information about the subsets of OCD, and suggested to Jovani Ybarra that she appears to be suffering with a degree of obsessions/compulsions  She does confide that she is "a  "    Assessment:  Rubis mood is anxious  Her affect is congruent and tearful at times  She engages cooperatively with therapist and demonstrates fair insight into her thoughts/feelings/behaviors  Her thoughts are logical and goal oriented  She does confide that she has fleeting thoughts of suicide  She denies HI  Jovani Ybarra stresses that she has made no plans to act on SI    She states that she "just finds life overwhelming as a sensitive person "    Plan:  Kody Licking will return in one week  We will proceed with exploring her spiritual beliefs as she admits that she does not know what brings her life meaning  Pain:      none    0    Current suicide risk : Low         Behavioral Health Treatment Plan [facility]: Diagnosis and Treatment Plan explained to Mary Mckinley relates understanding diagnosis and is agreeable to Treatment Plan   YES

## 2021-07-11 ENCOUNTER — AMB VIDEO VISIT (OUTPATIENT)
Dept: OTHER | Facility: HOSPITAL | Age: 46
End: 2021-07-11
Payer: COMMERCIAL

## 2021-07-11 PROCEDURE — ECARE PR SL URGENT CARE VIRTUAL VISIT: Performed by: FAMILY MEDICINE

## 2021-07-11 NOTE — CARE ANYWHERE EVISITS
Visit Summary for Yousif Gloria - Gender: Female - Date of Birth: [id number]  Date: [id number] - Duration: 5 minutes  Patient: Yousif Gloria  Provider: Bipin Miller    Patient Contact Information  Address  [address] [place] [address]; [address]  [phone]    Visit Topics  I have thrush and a yeast infection  [Added By: Self - 2021-07-11]    Triage Questions   What is your current physical address in the event of a medical emergency? Answer []  Are you allergic to any medications? Answer []  Are you now or could you be pregnant? Answer []  Do you have any immune system compromise or chronic lung   disease? Answer []  Do you have any vulnerable family members in the home (infant, pregnant, cancer, elderly)? Answer []     Conversation Transcripts  [0A][0A] [Notification] You are connected with Bipin Miller, Family Physician [0A][Notification] Kandis Burnham is located in [place]  [0A][Notification] Kandis Burnham has shared health history  Francis Posadas  [0A][Notification] Bipin Miller has added a   prescription [0A][Notification] Bipin Miller has added a prescription [0A][Notification] Bipin Miller has added a diagnosis/procedure code  [0A][Notification] Bipin Miller has added a diagnosis/procedure code  [0A][Notification] Bipin Miller has   added a diagnosis/procedure code  [0A]    Diagnosis  Candidal stomatitis  Acute vaginitis    Procedures  Value: [id number] Code: CPT-4 OL DIG E/M SVC 11-20 MIN    Medications Prescribed    clotrimazole  Dose : 1 tablet  Route : mucous membrane  Frequency : 5 times a day  Patient Instructions : melt slowly in mouth  Refills : 0  Instructions to the Pharmacist : Substitutions allowed    Diflucan      Frequency :   Patient Instructions : 1 po prn antibioitic associated vaginitis     Refills : 0  Instructions to the Pharmacist : Substitutions allowed      Provider Notes  [0A][0A] The caller confirms they are the patient on the registration, and that they are calling from [place][0A]Visit Mode: Video[0A]HPI: Onset 5 days of sore throat now vaginal itching a course of augmentin for sinusitis  Similar with antibiotics in the   past  Diflucan and [0A]PMH: depression, GERD[0A]Meds:  citalopram, Omeprazole , vit D[0A]Allergies: NKDA[0A]Nonsmoking[0A]PSHx: none[0A]Not pregnant or nursing  LMP: 3 weeks ago  [0A]On video exam the patient appears in no distress  white coating to   tongue [0A]Assessment: thrush and vagintitis [0A]Plan: diflucan and clotrimazole [0A]See a doctor in person at any time If worsening or new symptoms , or if not improving as expected over 48 hrs  [0A]Discussed the differential diagnosis, risks/benefits   for treatment options and when to seek in person care  All questions were addressed  Patient voiced understanding and agreement with plan  [0A]=================================[0A]1  If you received a prescription at this visit and you have a question   or problem please call [phone] for prescription assistance[0A]2  Please print a copy of this note and send it to your regular doctor, or take it to your next visit so it may be included in your medical record[0A]3    Please see your primary care   provider on an annual basis or more frequently if directed[0A][0A]    Electronically signed by: Fabian De Los Santos(NPI [id number])

## 2021-07-20 ENCOUNTER — SOCIAL WORK (OUTPATIENT)
Dept: BEHAVIORAL/MENTAL HEALTH CLINIC | Facility: CLINIC | Age: 46
End: 2021-07-20
Payer: COMMERCIAL

## 2021-07-20 DIAGNOSIS — F33.1 RECURRENT MAJOR DEPRESSIVE EPISODES, MODERATE (HCC): Primary | ICD-10-CM

## 2021-07-20 PROCEDURE — 90834 PSYTX W PT 45 MINUTES: CPT | Performed by: SOCIAL WORKER

## 2021-07-20 NOTE — PSYCH
Psychotherapy Provided: Individual Psychotherapy 50 minutes     Length of time in session: 50 minutes, follow up in 1 week    Encounter Diagnosis     ICD-10-CM    1  Recurrent major depressive episodes, moderate (HCC)  F33 1        Goals addressed in session: Goal 1     Data:  Cassandra Casey presents tonight stating "nothing has changed "  She states that her anxiety continues to be "bad" and she feels that her mind just not stop racing  Cassandra Casey reports that she worries about "everything," often thinking about existential dilemmas (e g  why there is evil, where we go after we die, etc )  Cassandra Casey adds that she has no time to engage in self care - but she finds being in nature to be the only way to quiet her mind  Using CBT interventions, therapist focused on this fact today, pointing out to Cassandra Casey that she's needs to be intentional about caring for herself  Therapist assisted Cassandra Casey to explore what she needs to get done each day vs what she feels she should get done  Her  does not assist with any housework - and he keeps buying stuff that clutters their house  Cassandra Casey confides that she also hoards things  She notes that she also has 5 dogs and 4 cats that require a lot of care  Therapist suggested to Cassandra Casey that she make a list of what she must do - and consider if her standards for orderliness/cleanliness are too high  Therapist also recommended that she take a break from Facebook (which she enjoys at night) and start reading the books that she wishes she had time to read  Assessment:  Brandie's mood is anxious  Her affect is congruent  She engages cooperatively with therapist and seems to have good insight into her behaviors  She recognizes that she is very concerned about what others think of her and she would like to break this habit  Cassandra Casey denies SI/HI  Plan:  Cassandra Casey will follow up in 1 week  We will continue to explore her beliefs about herself      Pain:      none    0    Current suicide risk : Low Behavioral Health Treatment Plan [facility]: Diagnosis and Treatment Plan explained to Suad العلي relates understanding diagnosis and is agreeable to Treatment Plan   YES

## 2021-07-27 ENCOUNTER — SOCIAL WORK (OUTPATIENT)
Dept: BEHAVIORAL/MENTAL HEALTH CLINIC | Facility: CLINIC | Age: 46
End: 2021-07-27
Payer: COMMERCIAL

## 2021-07-27 DIAGNOSIS — F33.1 RECURRENT MAJOR DEPRESSIVE EPISODES, MODERATE (HCC): Primary | ICD-10-CM

## 2021-07-27 PROCEDURE — 90834 PSYTX W PT 45 MINUTES: CPT | Performed by: SOCIAL WORKER

## 2021-07-28 NOTE — PSYCH
Psychotherapy Provided: Individual Psychotherapy 60 minutes     Length of time in session: 60 minutes, follow up in 1 week    Encounter Diagnosis     ICD-10-CM    1  Recurrent major depressive episodes, moderate ([facility] [place] [address] )  F33 1        Goals addressed in session: Goal 1     Data:  Ramone Huynh reports today that nothing has changed  She still struggles daily with racing thoughts that she cannot slow down  She admits that she worries excessively about everything and she has not found containment to be helpful  She notes that she multi-tasks constantly - even when she is driving  Therapist recommended that she place her phones in the backseat and that she focus herself on driving only  Therapist also recommended that every time she returns to her car following a patient appointment that she take a moment or two to breathe  Ramone Huynh did share that she followed therapist's recommendation and did start reading a bit over the weekend  She also picked up a mindfulness notebook that she has begun working on  Nonetheless she felt overwhelmed again over the weekend and frustrated with the tons of unfinished projects that she has as well as the fact that her  is constantly on the go  She states in session that she wants so much to slow down and minimize the demands of others on her life  She is even considering moving out of her home but does not know where she would go  Therapist focused on provided psycho-ed around mindfulness, establishing priorities, and setting small reasonable goals for herself  Therapist assisted Ramone Huynh to set a new goal for this week - she will approach her  about agreeing to staying home one night per week with her to work on household tasks  In addition, she will propose to him that they both read an article each week on the environment/outdoors and discuss it    Ramone Huynh shares that while they do things together (e g  take the dogs on walks) they do not engage in the emotional intimacy that she craves  Assessment:  Brandie's reported mood is depressed  Her affect is congruent; she is tearful  Her thoughts are logical and goal oriented  However she confides at one point during our session that she has resolved that "[she] will just be miserable for the rest of [her] life "  Sharyle Riding feels strongly that she is alone in the world  "Most people are so shallow "    Plan:  Sharyle Riding will return in one week  We will review her homework  Pain:      none    0    Current suicide risk : Low         Behavioral Health Treatment Plan [facility]: Diagnosis and Treatment Plan explained to Cecilio Lewis relates understanding diagnosis and is agreeable to Treatment Plan   YES

## 2021-08-03 ENCOUNTER — SOCIAL WORK (OUTPATIENT)
Dept: BEHAVIORAL/MENTAL HEALTH CLINIC | Facility: CLINIC | Age: 46
End: 2021-08-03
Payer: COMMERCIAL

## 2021-08-03 DIAGNOSIS — F33.1 RECURRENT MAJOR DEPRESSIVE EPISODES, MODERATE (HCC): Primary | ICD-10-CM

## 2021-08-03 PROCEDURE — 90834 PSYTX W PT 45 MINUTES: CPT | Performed by: SOCIAL WORKER
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
## 2021-08-31 ENCOUNTER — SOCIAL WORK (OUTPATIENT)
Dept: BEHAVIORAL/MENTAL HEALTH CLINIC | Facility: CLINIC | Age: 46
End: 2021-08-31
Payer: COMMERCIAL

## 2021-08-31 DIAGNOSIS — F33.1 RECURRENT MAJOR DEPRESSIVE EPISODES, MODERATE (HCC): Primary | ICD-10-CM

## 2021-08-31 PROCEDURE — 90834 PSYTX W PT 45 MINUTES: CPT | Performed by: SOCIAL WORKER

## 2021-09-01 NOTE — PSYCH
Psychotherapy Provided: Individual Psychotherapy 55 minutes     Length of time in session: 55 minutes, follow up in 1 week    Encounter Diagnosis     ICD-10-CM    1  Recurrent major depressive episodes, moderate ([facility] [address] )  F33 1        Goals addressed in session: Goal 1     Data:  Sharyle Riding presents today stating that "things remain the same "  She notes that she still is not talking to her mother and finds it very difficult to be with her or her 's family  She feels that both sides of the family are very racist and politically conservative  For this reason, she generally feels very anxious when she is with them  She shares, however, that she is growing in accepting herself as she is - not trying to be someone she is not  Nonetheless, Sharyle Riding shares that she finds it "next to impossible" to meet like-minded people or to make time to pursue her interests  She states that she is always weighed down by work and house cleaning demands  Using CBT interventions, therapist assisted Sharyle Riding to further explore her beliefs about herself as "different," and pointed out to her that there are many people who share her perspectives politically and over all sense of empathy for others  Therapist encouraged Sharyle Riding to reach out to on-line communities and block toxic people on [employer]  Therapist also assisted Sharyle Riding to explore her thoughts/feelings around the loss of a 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

## 2021-09-07 ENCOUNTER — SOCIAL WORK (OUTPATIENT)
Dept: BEHAVIORAL/MENTAL HEALTH CLINIC | Facility: CLINIC | Age: 46
End: 2021-09-07
Payer: COMMERCIAL

## 2021-09-07 DIAGNOSIS — F33.1 RECURRENT MAJOR DEPRESSIVE EPISODES, MODERATE (HCC): Primary | ICD-10-CM

## 2021-09-07 PROCEDURE — 90834 PSYTX W PT 45 MINUTES: CPT | Performed by: SOCIAL WORKER

## 2021-09-08 NOTE — PSYCH
Psychotherapy Provided: Individual Psychotherapy 50 minutes     Length of time in session: 50 minutes, follow up in 1 week    Encounter Diagnosis     ICD-10-CM    1  Recurrent major depressive episodes, moderate ([facility])  F33 1        Goals addressed in session: Goal 1     Data:  Helen Luz reports tonight that her symptoms of depression and anxiety remain unchanged  She scores an 18 on the JOE-7 and the PHQ-9 tonight; this is worse than previously  Helen Luz remarks that she is more aware of what is bothering her since starting therapy; she also finds the therapy sessions to be triggering  Helen Luz shares that she is very stressed by work because the focus on management is on productivity rather than quality  She feels 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 and 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 to see the requisite number of patients each day  "I will never be a star employee "  Helen Luz adds that she will always work more than 40 hours because she finds that she has no time during the day to do her charting  Helen Luz also reports tonight that she has resolved that she will only "ever be happy if [she] moves out "  Helen Luz finds her home and her 's buying practices to be overwhelming  She speaks at length about all the clutter in their home and how she dedicates each weekend to cleaning  She adds that she cannot get more than the bare minimum done because there is just too much stuff  Helen Luz also points out that she has 5 dogs, and comments that she realizes more just how much work they are  Therapist focused on this admission tonight and suggested to Helen Caponeilene that she look at the literature regarding minimalism  The research demonstrates that having less un-complicates out lives  Using CBT interventions, therapist provided psycho-ed re: Motivation and thoughts that can shut down our efforts to make changes in our lives    Helen Luz agrees that she is generally "just overwhelmed "  We concluded by discussing partialization and small changes that [place] can make  We worked to identify one space that [place] can focus on keeping clean and ordered  [place] committed to going to her 3rd floor and starting by cleaning out the [place] decorations  Assessment:  Brandie's stated mood is depressed/anxious  Her affect is congruent  She is engaged and maintains good eye contact  Her thoughts are logical and goal oriented  She denies SI/HI  [place] appears to be slowly gaining insight into what she values most and how to improve her symptoms  She reports that she is growing in self confidence as she recognizes that she is not "odd "    Plan:  [place] will return in one week  We will review her homework  Pain:      none    0    Current suicide risk : Low         Behavioral Health Treatment Plan [facility]: Diagnosis and Treatment Plan explained to Alonzo Pascual relates understanding diagnosis and is agreeable to Treatment Plan   YES

## 2021-09-13 ENCOUNTER — SOCIAL WORK (OUTPATIENT)
Dept: BEHAVIORAL/MENTAL HEALTH CLINIC | Facility: CLINIC | Age: 46
End: 2021-09-13
Payer: COMMERCIAL

## 2021-09-13 DIAGNOSIS — F33.1 RECURRENT MAJOR DEPRESSIVE EPISODES, MODERATE (HCC): Primary | ICD-10-CM

## 2021-09-13 PROCEDURE — 90834 PSYTX W PT 45 MINUTES: CPT | Performed by: SOCIAL WORKER

## 2021-09-14 NOTE — PSYCH
Psychotherapy Provided: Individual Psychotherapy 50 minutes     Length of time in session: 50 minutes, follow up in 1 week    Encounter Diagnosis     ICD-10-CM    1  Recurrent major depressive episodes, moderate ([facility] [address] )  F33 1        Goals addressed in session: Goal 1     Data:  Sebas Boyer presents tonight in session "feed-up" with work  She states that she is overly frustrated with the expectations of management and she does not know what to do  She feels strongly that she is being forced to skimp on quality in order to meet expectations for the number of visits that she must complete each day  She notes also that she is being asked to drive throughout [facility] which she finds very taxing  Therapist validated Brandie's thoughts/feelings and encouraged her to work at accepting the limits of what she can feasibly accomplish each day given her scheduling restrictions  Sebas Boyer verbalized agreement  She also shared that she has decided to eliminate some of her self-imposed expectations for herself - such as jarring tomatoes this year and decorating for Halloween  Sebas Boyer states that she is realizing that she spends a lot of time trying "to do everything for everybody" and she now wants to shift her priorities to caring for herself  She did read about minimalism and spoke with her  about trying to adopt more of a minimalist approach  He was on board at first but then made fun of her for aiming to change their behavior  Sebas Boyer felt completely invalidated and angry  She shares that her  has historically always put himself and his family before her  Therapist concluded the session by updating Brandie's treatment plan  Assessment:  Brandie's stated mood is depressed  Her affect is congruent  She is cooperative and maintains good eye contact  Her thoughts are logical and goal oriented  She denies SI/HI  Sebas Boyer does appear to engage in all or nothing thinking    She seems to find it very difficult to identify small, reasonable changes that she can make to disrupt maladaptive patterns  She also seems to have little hope that her relationship with her  can improve  For this reason, she seems to frequently come to the conclusion that she needs "to resign" herself to things as they are  [place] helplessness? Plan:  Marah Aracely will return in one week  We will continue working at identifying and following through with making small, reasonable changes  Pain:      none    0    Current suicide risk : Low         Behavioral Health Treatment Plan [facility]: Diagnosis and Treatment Plan explained to Shoshana King relates understanding diagnosis and is agreeable to Treatment Plan   YES

## 2021-09-14 NOTE — BH TREATMENT PLAN
Dori Ayala  1975       Date of Initial Treatment Plan: 4/16/21   Date of Current Treatment Plan: 09/14/21    Treatment Plan Number 2    Helen Luz reports today that she has learn to be more accepting of herself as she is and to refrain from being overly self critical (apologizing for everything)  She adds that she is learning that she has limited energy and she wishes to expend her energy in ways that will bring her jack  Strengths/Personal Resources for Self Care: Very empathic, loves her animals, goal oriented    Diagnosis:   1  Recurrent major depressive episodes, moderate ([facility] [address] )         Area of Needs: Depression      Long Term Goal 1: "I would like to feel less overwhelmed and depressed "    Target Date: 2/14/22  Completion Date: To be determined by therapist and Gagan Gramajo Term Objectives for Goal 1:                     1   Helen Luz will come to therapy and process thoughts and feelings regarding current stressors                                                                2   Helen Luz will continue to examine her thoughts - and work to replace thoughts that are supporting the presence of symptoms (depression and anxiety)       3  Helen Luz will work to identify small, reasonable steps that she can take to minimize the chaos in her life (especially with regard to expectations that she holds for herself)       4  Helen Luz will continue to verbalize how she is feeling/thinking to her  and identify what she needs from him  5   Helen Luz will continue to participate in med management and follow recommendations                                                              Responsible:  Helen Caponeilene and Therapist        GOAL 1: Modality: Individual/weekly/client centered, CBT, mindfulness, trauma informed, solution focused        [address]: Diagnosis and Treatment Plan explained to Enzo David relates understanding diagnosis and is agreeable to Treatment Plan         Client Comments : Please share your thoughts, feelings, need and/or experiences regarding your treatment plan: Jovani Ybarra gave verbal consent for this plan due to COVID restrictions

## 2021-09-20 ENCOUNTER — SOCIAL WORK (OUTPATIENT)
Dept: BEHAVIORAL/MENTAL HEALTH CLINIC | Facility: CLINIC | Age: 46
End: 2021-09-20
Payer: COMMERCIAL

## 2021-09-20 DIAGNOSIS — F41.1 GENERALIZED ANXIETY DISORDER: ICD-10-CM

## 2021-09-20 DIAGNOSIS — F33.1 RECURRENT MAJOR DEPRESSIVE EPISODES, MODERATE (HCC): Primary | ICD-10-CM

## 2021-09-20 PROCEDURE — 90834 PSYTX W PT 45 MINUTES: CPT | Performed by: SOCIAL WORKER

## 2021-09-20 NOTE — PSYCH
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 and her focus on providing quality care  Nonah Cockayne has resolved this past weekend to return to school - 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  She is really excited about this but worries that she will become overwhelmed  Nonah Cockayne has taken therapist's recommendation to eliminate some of the demands that she places on herself - e g  to can vegetables, to maintain a garden and plant flowers, to decorate for every holiday - as she has concluded that they do not bring her jack  Nonah Cockayne states that being outdoors in nature and following her passion to care for patients is what brings her jack  Nonah Cockayne remarks that she has been sharing these new discoveries about herself with her  and he is being supportive  Therapist praised Nonah Cockayne for identifying what brings her happiness and taking steps to achieve this  Therapist also focused on gaining more history of Brandie's symptoms - and noted that Nonah Cockayne does report a history of mood swings, irritability, impulsiveness, and racing thoughts - albeit not moderately or severely disabling  Therapist shared with Nonah Cockayne that she suspects Bipolar Disorder with cycles of hypomania  Nonah Cockayne does note that she has "always been called high strung by [her] family "  She agrees to consult Caleb Harper in this office  Therapist assigned Nonah Cockayne the task of keeping track of her mood  Assessment:  Brandie's stated mood is anxious    Her affect is congruent  She is cooperative and maintains good eye contact  Brandie's thought are logical and goal oriented  She denies SI/HI  Manjinder Claros does appear to set high expectations for herself - and reportedly has difficulty relaxing  She is "always racing inside "  Manjinder Claros does confide that her uses alcohol to quiet her excessive thinking  Plan:  Manjinder Claros will return in one week  We will begin to explore her homework  Pain:      none    0    Current suicide risk : Low         Behavioral Health Treatment Plan [facility]: Diagnosis and Treatment Plan explained to Myrna Faith relates understanding diagnosis and is agreeable to Treatment Plan   YES

## 2021-09-27 ENCOUNTER — SOCIAL WORK (OUTPATIENT)
Dept: BEHAVIORAL/MENTAL HEALTH CLINIC | Facility: CLINIC | Age: 46
End: 2021-09-27
Payer: COMMERCIAL

## 2021-09-27 DIAGNOSIS — F41.1 GENERALIZED ANXIETY DISORDER: Primary | ICD-10-CM

## 2021-09-27 DIAGNOSIS — F33.1 RECURRENT MAJOR DEPRESSIVE EPISODES, MODERATE (HCC): ICD-10-CM

## 2021-09-27 PROCEDURE — 90834 PSYTX W PT 45 MINUTES: CPT | Performed by: SOCIAL WORKER
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
## 2021-09-29 DIAGNOSIS — F33.41 RECURRENT MAJOR DEPRESSIVE DISORDER, IN PARTIAL REMISSION (HCC): ICD-10-CM

## 2021-09-29 DIAGNOSIS — F33.1 RECURRENT MAJOR DEPRESSIVE EPISODES, MODERATE (HCC): ICD-10-CM

## 2021-09-29 NOTE — TELEPHONE ENCOUNTER
Requesting refills on xanax and celexa, to [address]  Refills ordered, please sign and send

## 2021-09-30 RX ORDER — ALPRAZOLAM 0.5 MG/1
0.5 TABLET ORAL 3 TIMES DAILY PRN
Qty: 90 TABLET | Refills: 0 | Status: SHIPPED | OUTPATIENT
Start: 2021-09-30

## 2021-09-30 RX ORDER — CITALOPRAM 40 MG/1
40 TABLET ORAL DAILY
Qty: 90 TABLET | Refills: 1 | Status: SHIPPED | OUTPATIENT
Start: 2021-09-30 | End: 2022-02-15 | Stop reason: SDUPTHER

## 2021-10-12 ENCOUNTER — SOCIAL WORK (OUTPATIENT)
Dept: BEHAVIORAL/MENTAL HEALTH CLINIC | Facility: CLINIC | Age: 46
End: 2021-10-12
Payer: COMMERCIAL

## 2021-10-12 DIAGNOSIS — F33.1 RECURRENT MAJOR DEPRESSIVE EPISODES, MODERATE (HCC): ICD-10-CM

## 2021-10-12 DIAGNOSIS — F41.1 GENERALIZED ANXIETY DISORDER: Primary | ICD-10-CM

## 2021-10-12 PROCEDURE — 90834 PSYTX W PT 45 MINUTES: CPT | Performed by: SOCIAL WORKER

## 2021-11-01 ENCOUNTER — TELEPHONE (OUTPATIENT)
Dept: INTERNAL MEDICINE CLINIC | Facility: CLINIC | Age: 46
End: 2021-11-01

## 2021-11-01 ENCOUNTER — TELEMEDICINE (OUTPATIENT)
Dept: INTERNAL MEDICINE CLINIC | Facility: CLINIC | Age: 46
End: 2021-11-01
Payer: COMMERCIAL

## 2021-11-01 ENCOUNTER — APPOINTMENT (OUTPATIENT)
Dept: RADIOLOGY | Facility: CLINIC | Age: 46
End: 2021-11-01
Payer: COMMERCIAL

## 2021-11-01 VITALS
OXYGEN SATURATION: 97 % | TEMPERATURE: 97.3 F | HEIGHT: 63 IN | HEART RATE: 68 BPM | BODY MASS INDEX: 29.59 KG/M2 | WEIGHT: 167 LBS

## 2021-11-01 DIAGNOSIS — J06.9 UPPER RESPIRATORY TRACT INFECTION, UNSPECIFIED TYPE: ICD-10-CM

## 2021-11-01 DIAGNOSIS — J06.9 UPPER RESPIRATORY TRACT INFECTION, UNSPECIFIED TYPE: Primary | ICD-10-CM

## 2021-11-01 PROCEDURE — 99213 OFFICE O/P EST LOW 20 MIN: CPT | Performed by: PHYSICIAN ASSISTANT

## 2021-11-01 PROCEDURE — 71046 X-RAY EXAM CHEST 2 VIEWS: CPT

## 2021-11-01 RX ORDER — AMOXICILLIN AND CLAVULANATE POTASSIUM 875; 125 MG/1; MG/1
1 TABLET, FILM COATED ORAL EVERY 12 HOURS SCHEDULED
Qty: 14 TABLET | Refills: 0 | Status: SHIPPED | OUTPATIENT
Start: 2021-11-01 | End: 2021-11-08

## 2021-11-01 RX ORDER — FLUCONAZOLE 150 MG/1
150 TABLET ORAL ONCE
Qty: 1 TABLET | Refills: 0 | Status: SHIPPED | OUTPATIENT
Start: 2021-11-01 | End: 2021-11-01

## 2021-11-09 ENCOUNTER — TELEPHONE (OUTPATIENT)
Dept: PSYCHIATRY | Facility: CLINIC | Age: 46
End: 2021-11-09

## 2021-11-22 ENCOUNTER — SOCIAL WORK (OUTPATIENT)
Dept: BEHAVIORAL/MENTAL HEALTH CLINIC | Facility: CLINIC | Age: 46
End: 2021-11-22
Payer: COMMERCIAL

## 2021-11-22 DIAGNOSIS — F33.1 RECURRENT MAJOR DEPRESSIVE EPISODES, MODERATE (HCC): ICD-10-CM

## 2021-11-22 DIAGNOSIS — F41.1 GENERALIZED ANXIETY DISORDER: Primary | ICD-10-CM

## 2021-11-22 PROCEDURE — 90834 PSYTX W PT 45 MINUTES: CPT | Performed by: SOCIAL WORKER

## 2021-11-29 ENCOUNTER — SOCIAL WORK (OUTPATIENT)
Dept: BEHAVIORAL/MENTAL HEALTH CLINIC | Facility: CLINIC | Age: 46
End: 2021-11-29
Payer: COMMERCIAL

## 2021-11-29 DIAGNOSIS — F33.1 RECURRENT MAJOR DEPRESSIVE EPISODES, MODERATE (HCC): ICD-10-CM

## 2021-11-29 DIAGNOSIS — F41.1 GENERALIZED ANXIETY DISORDER: Primary | ICD-10-CM

## 2021-11-29 PROCEDURE — 90834 PSYTX W PT 45 MINUTES: CPT | Performed by: SOCIAL WORKER

## 2021-12-07 ENCOUNTER — SOCIAL WORK (OUTPATIENT)
Dept: BEHAVIORAL/MENTAL HEALTH CLINIC | Facility: CLINIC | Age: 46
End: 2021-12-07
Payer: COMMERCIAL

## 2021-12-07 DIAGNOSIS — F41.1 GENERALIZED ANXIETY DISORDER: Primary | ICD-10-CM

## 2021-12-07 DIAGNOSIS — F33.1 RECURRENT MAJOR DEPRESSIVE EPISODES, MODERATE (HCC): ICD-10-CM

## 2021-12-07 PROCEDURE — 90834 PSYTX W PT 45 MINUTES: CPT | Performed by: SOCIAL WORKER

## 2021-12-21 ENCOUNTER — SOCIAL WORK (OUTPATIENT)
Dept: BEHAVIORAL/MENTAL HEALTH CLINIC | Facility: CLINIC | Age: 46
End: 2021-12-21
Payer: COMMERCIAL

## 2021-12-21 DIAGNOSIS — F33.1 RECURRENT MAJOR DEPRESSIVE EPISODES, MODERATE (HCC): ICD-10-CM

## 2021-12-21 DIAGNOSIS — F41.1 GENERALIZED ANXIETY DISORDER: Primary | ICD-10-CM

## 2021-12-21 PROCEDURE — 90834 PSYTX W PT 45 MINUTES: CPT | Performed by: SOCIAL WORKER

## 2021-12-28 ENCOUNTER — IMMUNIZATIONS (OUTPATIENT)
Dept: FAMILY MEDICINE CLINIC | Facility: HOSPITAL | Age: 46
End: 2021-12-28

## 2021-12-28 DIAGNOSIS — Z23 ENCOUNTER FOR IMMUNIZATION: Primary | ICD-10-CM

## 2021-12-28 PROCEDURE — 91306 COVID-19 MODERNA VACC 0.25 ML BOOSTER: CPT

## 2021-12-28 PROCEDURE — 0064A COVID-19 MODERNA VACC 0.25 ML BOOSTER: CPT

## 2022-01-01 NOTE — LETTER
October 16, 2019     Patient: Gemma Pearson   YOB: 1975   Date of Visit: 10/16/2019       To Whom it May Concern:    Juan David Amezquita is under my professional care  She was seen in my office on 10/16/2019  She may return to work on 10/28/2019  If you have any questions or concerns, please don't hesitate to call           Sincerely,          Evy Parnell PA-C        CC: No Recipients
yes

## 2022-01-10 ENCOUNTER — SOCIAL WORK (OUTPATIENT)
Dept: BEHAVIORAL/MENTAL HEALTH CLINIC | Facility: CLINIC | Age: 47
End: 2022-01-10
Payer: COMMERCIAL

## 2022-01-10 DIAGNOSIS — F41.1 GENERALIZED ANXIETY DISORDER: Primary | ICD-10-CM

## 2022-01-10 DIAGNOSIS — F33.1 RECURRENT MAJOR DEPRESSIVE EPISODES, MODERATE (HCC): ICD-10-CM

## 2022-01-10 PROCEDURE — 90834 PSYTX W PT 45 MINUTES: CPT | Performed by: SOCIAL WORKER
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
## 2022-01-11 ENCOUNTER — TELEPHONE (OUTPATIENT)
Dept: INTERNAL MEDICINE CLINIC | Facility: CLINIC | Age: 47
End: 2022-01-11

## 2022-01-11 NOTE — TELEPHONE ENCOUNTER
Pt was exposed to covid, only has a sore throat  Works at [employer], needs a neg test so she can go to work   Does she get a rapid test?

## 2022-01-17 ENCOUNTER — TELEMEDICINE (OUTPATIENT)
Dept: BEHAVIORAL/MENTAL HEALTH CLINIC | Facility: CLINIC | Age: 47
End: 2022-01-17
Payer: COMMERCIAL

## 2022-01-17 DIAGNOSIS — F41.1 GENERALIZED ANXIETY DISORDER: Primary | ICD-10-CM

## 2022-01-17 DIAGNOSIS — F33.1 RECURRENT MAJOR DEPRESSIVE EPISODES, MODERATE (HCC): ICD-10-CM

## 2022-01-17 PROCEDURE — 90834 PSYTX W PT 45 MINUTES: CPT | Performed by: SOCIAL WORKER

## 2022-01-18 NOTE — PSYCH
Psychotherapy Provided: Individual Psychotherapy 40 minutes     Length of time in session: 40 minutes, follow up in 1 week    Encounter Diagnosis     ICD-10-CM    1  Generalized anxiety disorder  F41 1    2  Recurrent major depressive episodes, moderate ([facility] [address] )  F33 1        Goals addressed in session: Goal 1     Data:  Alexia Browning presents this afternoon from her car  The connection is poor with therapist's voice delayed a few seconds  Alexia Browning finds this frustrating but agrees to proceed  She reports that she is talking daily with the man she has met  She states "I can't help how I feel," and shares that she plans to keep talking with him - essentially having an emotional affair  Therapist points this out, and assists Alexia Browning to explore her values and the morality of engaging in an affair  She resolves that she just is not happy with her ; she feels that the relationship is emotionally/spiritually stagnate despite all his efforts to improve things  Alexia العليdden concludes that "it just feels like a friendship "  Therapist, again, recommends reading the [employer] website for further insights into Brandie's own love language  Therapist concludes the session by reviewing safety - and encourages Alexia Browning to do some investigating into this man's background  We, then, role play how she might talk with her  about her intention to move out/separate from him and pursue a relationship with this man  Assessment:  Brandie's reported mood is euthymic  Her affect is congruent  She is torn, however, over what to do about her interest in the man she has met  "It's thrilling to talk to him "  Alexia Browning does appear enlivened by talking with him  She denies SI/HI  Plan:  Alexia Browning will follow up in a week  At that time, we will process the events of the week      Pain:      moderate to severe    2    Current suicide risk : [address]: Diagnosis and Treatment Plan explained to Alexia Browning, Pavan Olivares relates understanding diagnosis and is agreeable to Treatment Plan  YES    Virtual Regular Visit    Verification of patient location:    Patient is located in the following state in which I hold an active license [place]      Assessment/Plan:    Problem List Items Addressed This Visit        Other    Recurrent major depressive episodes, moderate ([address] )    Generalized anxiety disorder - Primary          Goals addressed in session: Goal 1          Reason for visit is   Chief Complaint   Patient presents with    Virtual Regular Visit        Encounter provider Jem Torres LCSW    Provider located at [address] [place] [id number] [phone]      Recent Visits  No visits were found meeting these conditions  Showing recent visits within past 7 days and meeting all other requirements  Future Appointments  No visits were found meeting these conditions  Showing future appointments within next 150 days and meeting all other requirements       The patient was identified by name and date of birth  Nasima Ying was informed that this is a telemedicine visit and that the visit is being conducted through[employer] Embed[employer] and patient was informed this is a secure, HIPAA-complaint platform  She agrees to proceed     My office door was closed  No one else was in the room  She acknowledged consent and understanding of privacy and security of the video platform  The patient has agreed to participate and understands they can discontinue the visit at any time  Patient is aware this is a billable service  Subjective  Nasima Ying is a 42 y o  female          HPI     Past Medical History:   Diagnosis Date    Anxiety     Candidiasis of mouth     Last Assessed [id number]    Depression     Hypertension     Memory loss 4/7/2021    Obesity     Sciatica     Last Assessed [id number]    Skin rash     Last Assessed [id number]    Urticaria     Last Assessed [id number]       Past Surgical History:   Procedure Laterality Date    DENTAL SURGERY         Current Outpatient Medications   Medication Sig Dispense Refill    ALPRAZolam (XANAX) 0 5 mg tablet Take 1 tablet (0 5 mg total) by mouth 3 (three) times a day as needed (as neded) 90 tablet 0    citalopram (CeleXA) 40 mg tablet Take 1 tablet (40 mg total) by mouth daily 90 tablet 1    ibuprofen (MOTRIN) 200 mg tablet Take by mouth every 6 (six) hours as needed for mild pain      omeprazole (PriLOSEC OTC) 20 MG tablet Take 20 mg by mouth daily      VITAMIN D PO Take 1,000 Units by mouth daily       No current facility-administered medications for this visit  Allergies   Allergen Reactions    Pollen Extract Allergic Rhinitis       Review of Systems    Video Exam    There were no vitals filed for this visit  Physical Exam     I spent 40 minutes directly with the patient during this visit    VIRTUAL VISIT Sommer Li  verbally agrees to participate in [facility]  Pt is aware that [facility] could be limited without vital signs or the ability to perform a full hands-on physical exam  Brandie Gloria understands she or the provider may request at any time to terminate the video visit and request the patient to seek care or treatment in person

## 2022-01-24 ENCOUNTER — TELEMEDICINE (OUTPATIENT)
Dept: INTERNAL MEDICINE CLINIC | Facility: CLINIC | Age: 47
End: 2022-01-24
Payer: COMMERCIAL

## 2022-01-24 ENCOUNTER — SOCIAL WORK (OUTPATIENT)
Dept: BEHAVIORAL/MENTAL HEALTH CLINIC | Facility: CLINIC | Age: 47
End: 2022-01-24
Payer: COMMERCIAL

## 2022-01-24 VITALS — BODY MASS INDEX: 29.58 KG/M2 | HEIGHT: 63 IN

## 2022-01-24 DIAGNOSIS — F33.1 RECURRENT MAJOR DEPRESSIVE EPISODES, MODERATE (HCC): Primary | ICD-10-CM

## 2022-01-24 DIAGNOSIS — F39 MOOD DISORDER (HCC): Primary | ICD-10-CM

## 2022-01-24 PROCEDURE — 99213 OFFICE O/P EST LOW 20 MIN: CPT | Performed by: PHYSICIAN ASSISTANT

## 2022-01-24 PROCEDURE — 90834 PSYTX W PT 45 MINUTES: CPT | Performed by: SOCIAL WORKER

## 2022-01-24 RX ORDER — FLUCONAZOLE 150 MG/1
TABLET ORAL
COMMUNITY
Start: 2021-11-01 | End: 2022-01-27

## 2022-01-24 NOTE — LETTER
January 24, 2022     Patient: Mervin Euceda   YOB: 1975   Date of Visit: 1/24/2022       To Whom it May Concern:    Yuridia Head is under my professional care  She was seen in my office on 1/24/2022  She may return to work on 1/31/22  If you have any questions or concerns, please don't hesitate to call           Sincerely,          Evy Parnell PA-C        CC: No Recipients

## 2022-01-25 ENCOUNTER — TELEPHONE (OUTPATIENT)
Dept: PSYCHOLOGY | Facility: CLINIC | Age: 47
End: 2022-01-25

## 2022-01-25 PROBLEM — F39 MOOD DISORDER (HCC): Status: ACTIVE | Noted: 2022-01-25
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
## 2022-01-25 NOTE — PROGRESS NOTES
Assessment/Plan:  Problem List Items Addressed This Visit        Other    Recurrent major depressive episodes, moderate ([address] ) - Primary     Continue current regime  Keep therapy appt  RTW 1/31/22  Diagnoses and all orders for this visit:    Recurrent major depressive episodes, moderate ([address] )    Other orders  -     fluconazole (DIFLUCAN) 150 mg tablet; take 1 tablet by mouth AS A ONE TIME DOSE (Patient not taking: Reported on 1/24/2022)        Recurrent major depressive episodes, moderate (HCC)  Continue current regime  Keep therapy appt  RTW 1/31/22  Subjective:      Patient ID: Marcia Johnson is a 42 y o  female  Pt presents for virtual same day to discuss worsening depression and anxiety  Pt is quite tearful  She has been following closely with her therapist and they have discussed the option for PHP  Pt is on celexa and compliant with this  She is hesitant to explore other med options due to history of poor tolerability  She notes marital discord and this has been a factor in her symptoms worsening  She desires the week off from work while she irons out a treatment plan with his psychiatry office  The following portions of the patient's history were reviewed and updated as appropriate:   She has a past medical history of Anxiety, Candidiasis of mouth, Depression, Hypertension, Memory loss (4/7/2021), Obesity, Sciatica, Skin rash, and Urticaria ,  does not have any pertinent problems on file  ,   has a past surgical history that includes Dental surgery  ,  family history includes Cancer in her family and father; Depression in her mother; Diabetes in her family; Heart disease in her family; Hypertension in her father; Osteoporosis in her mother; Stroke in her family; Testicular cancer in her father  ,   reports that she has never smoked  She has never used smokeless tobacco  She reports current alcohol use of about 3 0 standard drinks of alcohol per week   She reports that she does not use drugs  ,  is allergic to pollen extract     Current Outpatient Medications   Medication Sig Dispense Refill    ALPRAZolam (XANAX) 0 5 mg tablet Take 1 tablet (0 5 mg total) by mouth 3 (three) times a day as needed (as neded) 90 tablet 0    citalopram (CeleXA) 40 mg tablet Take 1 tablet (40 mg total) by mouth daily 90 tablet 1    ibuprofen (MOTRIN) 200 mg tablet Take by mouth every 6 (six) hours as needed for mild pain      omeprazole (PriLOSEC OTC) 20 MG tablet Take 20 mg by mouth daily      VITAMIN D PO Take 1,000 Units by mouth daily      fluconazole (DIFLUCAN) 150 mg tablet take 1 tablet by mouth AS A ONE TIME DOSE (Patient not taking: Reported on 1/24/2022)       No current facility-administered medications for this visit  Review of Systems   Constitutional: Negative for chills and fever  HENT: Negative for congestion, ear pain, hearing loss, postnasal drip, rhinorrhea, sinus pressure, sinus pain, sore throat and trouble swallowing  Eyes: Negative for pain and visual disturbance  Respiratory: Negative for cough, chest tightness, shortness of breath and wheezing  Cardiovascular: Negative  Negative for chest pain, palpitations and leg swelling  Gastrointestinal: Negative for abdominal pain, blood in stool, constipation, diarrhea, nausea and vomiting  Endocrine: Negative for cold intolerance, heat intolerance, polydipsia, polyphagia and polyuria  Genitourinary: Negative for difficulty urinating, dysuria, flank pain and urgency  Musculoskeletal: Negative for arthralgias, back pain, gait problem and myalgias  Skin: Negative for rash  Allergic/Immunologic: Negative  Neurological: Negative for dizziness, weakness, light-headedness and headaches  Hematological: Negative  Psychiatric/Behavioral: Positive for dysphoric mood  Negative for behavioral problems and sleep disturbance  The patient is nervous/anxious            PHQ-2/9 Depression Screening Objective:  Vitals:    01/24/22 1345   Height: 5' 3" (1 6 m)     Body mass index is 29 58 kg/m²  Physical Exam  Constitutional:       General: She is not in acute distress  Appearance: She is well-developed  She is not diaphoretic  HENT:      Head: Normocephalic and atraumatic  Right Ear: External ear normal       Left Ear: External ear normal       Nose: Nose normal       Mouth/Throat:      Pharynx: No oropharyngeal exudate  Eyes:      General: No scleral icterus  Right eye: No discharge  Left eye: No discharge  Conjunctiva/sclera: Conjunctivae normal       Pupils: Pupils are equal, round, and reactive to light  Neck:      Thyroid: No thyromegaly  Cardiovascular:      Rate and Rhythm: Normal rate and regular rhythm  Heart sounds: Normal heart sounds  No murmur heard  No friction rub  No gallop  Pulmonary:      Effort: Pulmonary effort is normal  No respiratory distress  Breath sounds: Normal breath sounds  No wheezing or rales  Abdominal:      General: Bowel sounds are normal  There is no distension  Palpations: Abdomen is soft  Tenderness: There is no abdominal tenderness  Musculoskeletal:         General: No tenderness or deformity  Normal range of motion  Cervical back: Normal range of motion and neck supple  Skin:     General: Skin is warm and dry  Neurological:      Mental Status: She is alert and oriented to person, place, and time  Cranial Nerves: No cranial nerve deficit  Psychiatric:         Mood and Affect: Mood is anxious and depressed  Behavior: Behavior normal          Thought Content: Thought content normal          Judgment: Judgment normal        BMI Counseling: Body mass index is 29 58 kg/m²  The BMI is above normal  Nutrition recommendations include decreasing portion sizes, consuming healthier snacks and limiting drinks that contain sugar   Rationale for BMI follow-up plan is due to patient being overweight or obese

## 2022-01-27 ENCOUNTER — TELEMEDICINE (OUTPATIENT)
Dept: PSYCHIATRY | Facility: CLINIC | Age: 47
End: 2022-01-27
Payer: COMMERCIAL

## 2022-01-27 ENCOUNTER — OFFICE VISIT (OUTPATIENT)
Dept: PSYCHOLOGY | Facility: CLINIC | Age: 47
End: 2022-01-27
Payer: COMMERCIAL

## 2022-01-27 DIAGNOSIS — F33.1 RECURRENT MAJOR DEPRESSIVE EPISODES, MODERATE (HCC): Primary | ICD-10-CM

## 2022-01-27 DIAGNOSIS — F41.1 GENERALIZED ANXIETY DISORDER: ICD-10-CM

## 2022-01-27 PROCEDURE — 90792 PSYCH DIAG EVAL W/MED SRVCS: CPT | Performed by: PSYCHIATRY & NEUROLOGY

## 2022-01-27 PROCEDURE — 90791 PSYCH DIAGNOSTIC EVALUATION: CPT
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
## 2022-01-27 NOTE — PSYCH
Virtual Regular Visit    Verification of patient location:    Patient is located in the following state in which I hold an active license [place]      Assessment/Plan:    Problem List Items Addressed This Visit        Other    Recurrent major depressive episodes, moderate ([address] ) - Primary    Generalized anxiety disorder          Goals addressed in session:           Reason for visit is No chief complaint on file  Encounter provider [facility] PARTIAL PS[facility]CH [facility]GRAM    Provider located at [address] [place] [address]  [id number] [place] [address]  Odell Pagan [place] [id number]      Recent Visits  Date Type Provider Dept   01/24/22 Telemedicine Evy Parnell PA-C [facility]   Showing recent visits within past 7 days and meeting all other requirements  Today's Visits  Date Type Provider Dept   01/27/22 Office Visit [facility] PARTIAL [facility]YCH GROUP THERAPY [facility]   Showing today's visits and meeting all other requirements  Future Appointments  No visits were found meeting these conditions  Showing future appointments within next 150 days and meeting all other requirements       The patient was identified by name and date of birth  Bryant Cordoba was informed that this is a telemedicine visit and that the visit is being conducted throughMicrosoft Teams and patient was informed that this is a secure, HIPAA-compliant platform  She agrees to proceed     My office door was closed  No one else was in the room  She acknowledged consent and understanding of privacy and security of the video platform  The patient has agreed to participate and understands they can discontinue the visit at any time  Patient is aware this is a billable service  Subjective  Bryant Cordoba is a 42 y o  female          HPI     Past Medical History:   Diagnosis Date    Anxiety     Candidiasis of mouth     Last Assessed [id number]    Depression     Hypertension     Memory loss 4/7/2021    Obesity     Sciatica     Last Assessed [id number]    Skin rash     Last Assessed [id number]    Urticaria     Last Assessed [id number]       Past Surgical History:   Procedure Laterality Date   [facility] [place] [facility]         Current Outpatient Medications   Medication Sig Dispense Refill    ALPRAZolam (XANAX) 0 5 mg tablet Take 1 tablet (0 5 mg total) by mouth 3 (three) times a day as needed (as neded) 90 tablet 0    citalopram (CeleXA) 40 mg tablet Take 1 tablet (40 mg total) by mouth daily 90 tablet 1    ibuprofen (MOTRIN) 200 mg tablet Take by mouth every 6 (six) hours as needed for mild pain      omeprazole (PriLOSEC OTC) 20 MG tablet Take 20 mg by mouth daily      VITAMIN D PO Take 1,000 Units by mouth daily       No current facility-administered medications for this visit  Allergies   Allergen Reactions    Pollen Extract Allergic Rhinitis       Review of Systems    Video Exam    There were no vitals filed for this visit  Physical Exam     I spent 50 minutes with patient today in which greater than 50% of the time was spent in counseling/coordination of care regarding see notes    VIRTUAL VISIT DISCLAIMER    Edson Brice verbally agrees to participate in [employer]  Pt is aware that [employer] could be limited without vital signs or the ability to perform a full hands-on physical exam  Brandie Gloria understands she or the provider may request at any time to terminate the video visit and request the patient to seek care or treatment in person
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
## 2022-01-27 NOTE — PSYCH
Assessment/Plan:      Diagnoses and all orders for this visit:    Recurrent major depressive episodes, moderate (HCC)    Generalized anxiety disorder          Subjective:     Patient ID: Smith Chavez is a 42 y o  female  [facility] Treatment Plan   AREAS OF NEED: Depression as evidenced by lack of energy, hopelessness, decreased concentration, anhedonia, agitation, sleep disturbance, excessive guilt, limited connections with others related to marital stressors and work/school stressors  Date Initiated: 1/27/2022    Strengths: work ethic, love of animals     LONG TERM GOAL:   Date Initiated: 1/27/2022  1 0 I will identify 3 signs that my depression has lessened and I feel more hopeful about my future  Target Date: 2/24/2022  Completion Date:       SHORT TERM OBJECTIVES:     Date Initiated: 1/27/2022  1 1 I will identify engaging in at least 1 pathway of hope daily - "normal routine, connection, spirituality, goal setting, and self-nurturing"  Revision Date:   Target Date: 2/9/2022  Completion Date:     Date Initiated: 1/27/2022  1 2 I will identify 3 things I can incorporate into my life for self care (outside of work and school)  Revision Date:   Target Date: 2/9/2022  Completion Date:     Date Initiated: 1/27/2022  1 3 I will take medications as prescribed and share questions and concerns if arise      Revision Date:   Target Date: 2/9/2022  Completion Date:     Date Initiated: 1/27/2022  1 4 I will identify 3 resources I can use for suppports     Revision Date:   Target Date: 2/9/2022  Completion Date:          7 DAY REVISION:    Date Initiated:  Revision Date:   Target Date:   Completion Date:    PSYCHIATRY:  Date Initiated: 1/27/2022  Medication Management and Education     The person(s) responsible for carrying out the plan is Marcella Gannon MD    NURSING/Education:   Date Initiated: 1/27/2022  1 1,1 2,1 3,1 4 This therapist will provide daily wellness group five days weekly to educate Vincenzo Barraza Faizan on S/S of her diagnoses and medications used in treatment  The person(s) responsible for carrying out the plan is CARISA Pineda    PSYCHOLOGY:   Date Initiated: 1/27/2022       1 1, 1 2, 1 4 Provide psychotherapy group 5 times per week to allow opportunity for Sruthi Byers to explore stressors and ways of coping     The person(s) responsible for carrying out the plan is Farooq Bautista    ALLIED THERAPY:   Date Initiated: 1/27/2022  1 1,1 2 Engage Sruthi Byers in AT group 5 times daily to encourage development and use of wellness tools to decrease symptoms and promote recovery through meaningful activity  The person(s) responsible for carrying out the plan is DELMA Lezama     CASE MANAGEMENT:   Date Initiated: 1/27/2022      1 0 This  will meet with Sruthi Byers 3-4 times weekly to assess treatment progress, discharge planning, connection to community supports and UR as indicated  Revision Date:   The person(s) responsible for carrying out the plan is DELMA Domínguez      TREATMENT REVIEW/COMMENTS:     DISCHARGE CRITERIA: Identify 3 signs of progress and complete relapse prevention plan  DISCHARGE PLAN: Op care  Estimated Length of Stay:10 treatment days           Diagnosis and Treatment Plan explained to Claudio More relates understanding diagnosis and is agreeable to Treatment Plan           CLIENT COMMENTS / Please share your thoughts, feelings, need and/or experiences regarding your treatment plan: _____________________________________________________________________________________________________________________________________________________________________________________________________________________________________________________________________________________________________________________ Date/Time: ______________     Patient Signature: _________________________________     Date/Time: ______________      Signature: _________________________________     Date/Time: ______________

## 2022-01-27 NOTE — PSYCH
Subjective:     Patient ID: Vinnie Salmeron is a 42 y o  female  [facility] Clinical Progress Notes      Specialized Services Documentation  Therapist must complete separate progress note for each specific clinical activity in which the individual participated during the day  Other will complete Pre=authorization upon first treatment day  Case Management Note    Kyrei Lee [facility]    Current suicide risk : Low     INDIVIDUAL PSYCHOTHERAPY  This case management session was facilitated virtually in a private office using HIPAA Compliant and Approved [employer] Teams  Vinnie Salmeron consented to the use of tele-video modality of treatment  [id number] Met with Vinnie Salmeron  Reviewed program and given on call number  she completed releases and OP providers/ PCP notified of admission and health care coordination form completed  Completed initial psycho-social evaluation and initial treatment goals discussed  She would prefer to do program in person and these arrangements were made  Medications changes/added/denied? No    Treatment session number: evaluation only    Individual Case Management Visit provided today?  Yes     [facility] follow up physician's orders: see Dr Higinio Alcazar note

## 2022-01-28 ENCOUNTER — APPOINTMENT (OUTPATIENT)
Dept: PSYCHOLOGY | Facility: CLINIC | Age: 47
End: 2022-01-28
Payer: COMMERCIAL

## 2022-01-31 ENCOUNTER — OFFICE VISIT (OUTPATIENT)
Dept: PSYCHIATRY | Facility: CLINIC | Age: 47
End: 2022-01-31
Payer: COMMERCIAL

## 2022-01-31 ENCOUNTER — OFFICE VISIT (OUTPATIENT)
Dept: PSYCHOLOGY | Facility: CLINIC | Age: 47
End: 2022-01-31
Payer: COMMERCIAL

## 2022-01-31 VITALS
SYSTOLIC BLOOD PRESSURE: 156 MMHG | HEIGHT: 63 IN | RESPIRATION RATE: 16 BRPM | HEART RATE: 69 BPM | DIASTOLIC BLOOD PRESSURE: 87 MMHG | TEMPERATURE: 97.2 F | WEIGHT: 188 LBS | BODY MASS INDEX: 33.31 KG/M2

## 2022-01-31 DIAGNOSIS — F33.1 RECURRENT MAJOR DEPRESSIVE EPISODES, MODERATE (HCC): Primary | ICD-10-CM

## 2022-01-31 DIAGNOSIS — F41.1 GENERALIZED ANXIETY DISORDER: ICD-10-CM

## 2022-01-31 PROCEDURE — 99213 OFFICE O/P EST LOW 20 MIN: CPT | Performed by: PSYCHIATRY & NEUROLOGY

## 2022-01-31 PROCEDURE — G0176 OPPS/PHP;ACTIVITY THERAPY: HCPCS

## 2022-01-31 PROCEDURE — G0410 GRP PSYCH PARTIAL HOSP 45-50: HCPCS

## 2022-01-31 PROCEDURE — G0177 OPPS/PHP; TRAIN & EDUC SERV: HCPCS

## 2022-01-31 NOTE — PSYCH
Subjective:     Patient ID: Shantelle Jimenez is a 42 y o  female  [facility] Clinical Progress Notes      Specialized Services Documentation  Therapist must complete separate progress note for each specific clinical activity in which the individual participated during the day  Psychotherapeutic Group (10 45-11 45)    Today's group was about personalities and how to better understand who we are (our characteristics)  They learned about the different Umanzor-Silva personality types and assessed themselves to gain further understanding of who they are  The group identified their personality types and ways this knowledge could be used to promote positive change, self-reflection, and a more comfortable life  These goals were achieved through multiple discussions, an MBTI (shortened version) done by all of the clients, and lecture  Shantelle Jimenez actively participated by sharing in each group discussion, taking the questionnaire, and sharing personal insights  Leta Garcia was quite overall but shared upon prompting  Treatment Objectives: 1 1, 1 2, 1 4 Therapist: Carolyne Greenfield MS    Education Therapy   5858-8487 Shantelle Jimenez actively shared in morning assessment and goal review  Presented as Receptive related to readiness to learn  Shantelle Jimenez did not have a goal as today was her first day in group  did not present with any barriers to learning  [id number] Shantelle Ganns engaged throughout the treatment day  Was engaged in learning related to Illness, Medication, Aftercare and Wellness Tools  Staff utilized Verbal, Written, A/V and Demonstration teaching methods  Shantelle Jimenez shared area of learning and set a goal for outside of program to stop worrying about work        Tx Plan Objective: 1 1,1 2, 1 4   Therapist: Carolyne Greenfield MS

## 2022-01-31 NOTE — PSYCH
Subjective:     Patient ID: Mervin Euceda is a 42 y o  female  [facility] Clinical Progress Notes      Specialized Services Documentation  Therapist must complete separate progress note for each specific clinical activity in which the individual participated during the day  Allied Therapy   [id number]-- Mervin Euceda  actively shared in [facility] group focused on how to use mindfulness techniques to process emotions when we don't have words to describe them  Mrevin Euceda  engaged in active music listening, music and art, and group discussion  Group explored practice of blackout poetry and mandalas to express subconscious emotions and themes  Mervin Euceda identified that their Ambreen Newer was a representation of their favorite things that make them unique  Some effort noted toward treatment goal   Continue AT to encourage the development and practice using blackout poetry and mandalas to alleviate symptoms and support wellness       Tx Plan Objective: 1 1, 1 2, 1 4  Therapist:  DELMA Frey

## 2022-01-31 NOTE — PSYCH
Subjective:     Patient ID: Rafael Weldon is a 42 y o  female  [facility] Clinical Progress Notes      Specialized Services Documentation  Therapist must complete separate progress note for each specific clinical activity in which the individual participated during the day  GROUP PSYCHOTHERAPY (5801-6125)  The group shared challenges they faced with managing their anger  We then discussed different anger management skills that could be used and how each member might implement these skills into their routine  The group was then asked the following questions:  - How does your anger present itself? - What is one skill you might use to work on your response? - What may be a challenge for you? Kamille Saunders seemed fairly attentive throughout the group session  Kamilleelba Saunders stated that one skill they may try to help manage their anger is exercise  Kamilleelba Saunders is encouraged to make progress towards goals and objectives through group participation and will continue to attend psychotherapy group      Tx plan objective: 1 1, 1 2   Therapist: Sridevi Ro MA

## 2022-01-31 NOTE — PSYCH
Subjective:     Patient ID: Edson Brice is a 42 y o  female  [facility] Clinical Progress Notes      Specialized Services Documentation  Therapist must complete separate progress note for each specific clinical activity in which the individual participated during the day  Other Pre-Authorization: Request for authorization faxed to [address] [place] [address]  Pre-Authorization: Spoke with Monika at [address] [place] [address] -   10 days authorized from 1/31/2022 to 2/11/2022  Authorization number [id number]  Reviewer assigned [phone] ([email or web address])  Julee Ambrose [facility]  Julee Ambrose MT-BC    Case Management Note    Julee Ambrose [facility]    Current suicide risk : Low     [id number] Met with Edson Brice  Reviewed program and given on call number  she completed releases and OP providers/ PCP notified of admission and health care coordination form completed  Completed initial psycho-social evaluation and initial treatment goals discussed  Treatment plan completed and signed  Briefly met again at the end of the treatment day  She continues to struggle with guilt related to not being a work  Reassured her that work received leave of absence request and letter of treatment - discussed the stigma we create related to our own mental health and not treating it as if she had had a heart attack  Medications changes/added/denied? Yes     Treatment session number: 1    Individual Case Management Visit provided today?  Yes     [facility] follow up physician's orders: see Dr An Goetz MD

## 2022-02-01 ENCOUNTER — OFFICE VISIT (OUTPATIENT)
Dept: PSYCHOLOGY | Facility: CLINIC | Age: 47
End: 2022-02-01
Payer: COMMERCIAL

## 2022-02-01 DIAGNOSIS — F41.1 GENERALIZED ANXIETY DISORDER: ICD-10-CM

## 2022-02-01 DIAGNOSIS — F33.1 RECURRENT MAJOR DEPRESSIVE EPISODES, MODERATE (HCC): Primary | ICD-10-CM

## 2022-02-01 PROCEDURE — G0176 OPPS/PHP;ACTIVITY THERAPY: HCPCS

## 2022-02-01 PROCEDURE — G0177 OPPS/PHP; TRAIN & EDUC SERV: HCPCS

## 2022-02-01 PROCEDURE — G0410 GRP PSYCH PARTIAL HOSP 45-50: HCPCS

## 2022-02-01 NOTE — PSYCH
Subjective:     Patient ID: Jessica Hua is a 42 y o  female  [facility] Clinical Progress Notes      Specialized Services Documentation  Therapist must complete separate progress note for each specific clinical activity in which the individual participated during the day  GROUP PSYCHOTHERAPY (4974-2153)   The group engaged in open discussions that allowed for conversations on the following topics; personal disclosures, decisions that need to be made, relationships with caregivers that effecting their present and forgiveness  Noris Carias shared her struggles with her  and the relationship with her mother  Both issues she felt are unresolved  She endorsed being fearful to move forward  Noris Carias engaged in different group discussions to encourage and support other members  Noris Carias demonstrated progress towards goals and objectives through group participation  Continue with psychotherapy    TX Plan Objectives: 1 1, 1 2, 1 4   Therapist: Alicia Crawley MA, [facility]

## 2022-02-01 NOTE — PSYCH
Subjective:     Patient ID: Smith Chavez is a 42 y o  female  [facility] Clinical Progress Notes      Specialized Services Documentation  Therapist must complete separate progress note for each specific clinical activity in which the individual participated during the day  Allied Therapy   [id number]--  Smith Chavez actively shared in [facility] group focused on the use of dialectic statements to decrease emotional intensity and black and white thinking as well as acknowledging that two opposing opinions or feelings can co-exist and both can be true  Smith Chavez engaged in group by actively listening to music, writing their own lyrics, creating artwork, group discussion, self-reflection,  and taking notes  Group explored practice of writing their own song lyrics to a well known tune to explore their own dialectic thoughts they can utilize for positive self-talk  Group member also listened to the same piece of music two times  The first time they were to draw a visual representation of an emotion they believed the piece was representing  The second time they listened to the piece they were to identify a different they felt when listening to the piece  Group began to identify when they felt both of these emotions at the same time and a positive dialectic statement they could utilize for the scenario  Some effort noted toward treatment goal   Continue AT to encourage the development and practice of dialectic statements to  alleviate symptoms and support wellness  Tx Plan Objective: 1 1, 1 2, & 1 4       Therapist:  DELMA Tejeda     Education Therapy   6364-2001 Smith Chavez actively shared in morning assessment and goal review  Presented as Receptive related to readiness to learn  Smith Chavez did not complete goal from last treatment day and identified the obstacle to starting her assignment was the she felt too emotionally exhausted  did not present with any barriers to learning  [id number] Lan Arreaga engaged throughout the treatment day  Was engaged in learning related to Illness, Medication, Aftercare and Wellness Tools  Staff utilized Verbal, Written, A/V and Demonstration teaching methods  Lan Arreaga shared area of learning and set a goal for outside of program to go to the gym and complete a school assignment        Tx Plan Objective: 1 1, 1 2, 1 4   Therapist:  DELMA Roach

## 2022-02-01 NOTE — PSYCH
Subjective:     Patient ID: Mervin Euceda is a 42 y o  female  [facility] Clinical Progress Notes      Specialized Services Documentation  Therapist must complete separate progress note for each specific clinical activity in which the individual participated during the day  Case Management Note    Perlita Viera [facility]    Current suicide risk : Low     Not a case management day for Mervin Euceda today  Did not reach out with any additional questions and concerns and aware of next scheduled 1:1       Medications changes/added/denied? No    Treatment session number: 2    Individual Case Management Visit provided today?  No    [facility] follow up physician's orders: na

## 2022-02-01 NOTE — PSYCH
Subjective:     Patient ID: Shantelle Jimenez is a 42 y o  female  [facility] Clinical Progress Notes      Specialized Services Documentation  Therapist must complete separate progress note for each specific clinical activity in which the individual participated during the day  GROUP PSYCHOTHERAPY (0131-0173)  The group engaged in education regarding self-talk and the different ways self-talk impacts mental health  Members were prompted to reflect on their negative self-talk and were then prompted to share different ways they can improve this  We discussed ways to utilize visualization and goal setting to help improve the way members feel about themselves  The group practiced positive self-talk by responding to at least one of the following prompts:  1  What is one negative statement you tend to engage in?  2  What is one way to change this to a positive statement? 3  Describe the person you wish to be  Leta Garcia seemed fairly engaged throughout the group session  Leta Garcia stated that one negative statement they struggle and engage with is "I'm not worth it " Leta Garcia is encouraged to make progress towards goals and objectives through group participation and will continue to attend psychotherapy group      Tx plan objective: 1 1, 1 2   Therapist: Christina Sousa MA

## 2022-02-02 ENCOUNTER — OFFICE VISIT (OUTPATIENT)
Dept: PSYCHOLOGY | Facility: CLINIC | Age: 47
End: 2022-02-02
Payer: COMMERCIAL

## 2022-02-02 DIAGNOSIS — F33.1 RECURRENT MAJOR DEPRESSIVE EPISODES, MODERATE (HCC): Primary | ICD-10-CM

## 2022-02-02 DIAGNOSIS — F41.1 GENERALIZED ANXIETY DISORDER: ICD-10-CM

## 2022-02-02 PROCEDURE — G0176 OPPS/PHP;ACTIVITY THERAPY: HCPCS

## 2022-02-02 PROCEDURE — G0410 GRP PSYCH PARTIAL HOSP 45-50: HCPCS

## 2022-02-02 PROCEDURE — G0177 OPPS/PHP; TRAIN & EDUC SERV: HCPCS

## 2022-02-02 NOTE — PSYCH
Subjective:     Patient ID: Carroll Scott is a 42 y o  female  [facility] Clinical Progress Notes      Specialized Services Documentation  Therapist must complete separate progress note for each specific clinical activity in which the individual participated during the day  Group Psychotherapy (5006-1924) Valentino Correa was verbally engaged and interacted during todays group on the DBT skills A C C E P T S  , STOP skill, Pros & Cons, and Tip Skill  This DBT acronym A C C E P T S  outlines seven different techniques for distracting yourself when distressing emotions start to overwhelm our thoughts  Each member participated in reviewing the seven different key techniques  Then TIP skill was reviewed, going over four different ways we can also manage extreme emotions  The group also discussed the STOP skill and Pros & Cons as different strategies to use when under distress  Valentino Correa will continue with life skills and psychotherapy groups  Good progress made towards treatment   Tx Plan Objective: 1 1,1 2 Therapist:  CARISA Ortiz

## 2022-02-02 NOTE — PSYCH
Subjective:     Patient ID: Yan Elizondo is a 42 y o  female  [facility] Clinical Progress Notes      Specialized Services Documentation  Therapist must complete separate progress note for each specific clinical activity in which the individual participated during the day  Case Management Note    [facility]aney [facility]    Current suicide risk : Low     INDIVIDUAL PSYCHOTHERAPY    5888-7157 Met with Yan Elziondo  Reported gaining from program and no longer having questions about if it is right for her  She reports her fear has shifted to her ability to maintain the tools and strategies once discharged and back to life  Discussed ways to begin to implement within that structure  Discussed sleep hygiene issues as well as beginning to explore relationship stressors  She was asked to ponder if she left her spouse would she be "okay" to be by herself and she said "no - she fears loneliness"  Encouraged her to work on herself and her comfort with herself  Medications changes/added/denied? No    Treatment session number: 3    Individual Case Management Visit provided today?  Yes     [facility] follow up physician's orders: na

## 2022-02-02 NOTE — PSYCH
Subjective:     Patient ID: Jany Ro is a 42 y o  female  [facility] Clinical Progress Notes      Specialized Services Documentation  Therapist must complete separate progress note for each specific clinical activity in which the individual participated during the day  GROUP PSYCHOTHERAPY (6687-5494)  The group was educated on the grounding technique called Emotional Freedom Tapping (EFT)  Members practiced EFT by following along to a prompt titled "Clearing Distractions and Creating Focus" and "3 Minute Tapping for Anxiety"  Members rated the intensity of their thoughts before and after participating in the tapping exercise  Group discussed scenarios where they could use the technique, how to adapt the practice to their needs and understanding identifying early warning signes and how stress responses effect baseline  Tiffany Fowler shared that she became tired after engaging in the practice  Jany Ro continues to make progress towards goals through verbal participation and engagement in activity during group  Continue with Psychotherapy     TX Plan Objectives: 1 1, 1 2, 1 4   Therapist: Aj Connelly MA, LPC

## 2022-02-02 NOTE — PSYCH
Subjective:     Patient ID: Charanjit Landeros is a 42 y o  female  [facility] Clinical Progress Notes      Specialized Services Documentation  Therapist must complete separate progress note for each specific clinical activity in which the individual participated during the day  Allied Therapy   3143-0073- Charanjit Landeros actively shared and participated in [facility] group focused on learning about what archetypes are, the purpose of survival archetypes,  the dark and light qualities of each archetype, and how each survival archetype relates to their life  Group focused on the victim and saboteur archetype today  Group explored archetypes through jaydon analysis, active discussion, and utilizing guided worksheets  Charanjit Landeros identified the importance of how sometimes our supports we encounter in our lives can be using the dark attributes to console us such as saying things like: "People have it much worse than you do " "You think that is bad, well guess what happened to me "  Some effort noted toward treatment goal   Continue AT to encourage the development and practice of utilizing their guided worksheet of questions to help them move from the dark or shadow qualities of an archetype to the light qualities  Tx Plan Objective: 1 1, 1 2, & 1 4       Therapist:  DELMA Munguia     Education Therapy   [id number] Charanjit Landeros actively shared in morning assessment and goal review  Presented as Receptive related to readiness to learn  Charanjit Landeros did complete goal from last treatment day identifying gaining responsibility  did not present with any barriers to learning  [id number] Charanjit Landeros engaged throughout the treatment day  Was engaged in learning related to Illness, Medication, Aftercare and Wellness Tools  Staff utilized Verbal, Written, A/V and Demonstration teaching methods    Charanjit Landeros shared area of learning and set a goal for outside of program to research archetypes and complete her school assignment        Tx Plan Objective: 1 1, 1 2, 1 4   Therapist:  DELMA Bailey

## 2022-02-03 ENCOUNTER — OFFICE VISIT (OUTPATIENT)
Dept: PSYCHOLOGY | Facility: CLINIC | Age: 47
End: 2022-02-03
Payer: COMMERCIAL

## 2022-02-03 ENCOUNTER — OFFICE VISIT (OUTPATIENT)
Dept: PSYCHIATRY | Facility: CLINIC | Age: 47
End: 2022-02-03
Payer: COMMERCIAL

## 2022-02-03 VITALS — TEMPERATURE: 97.7 F

## 2022-02-03 DIAGNOSIS — F33.1 RECURRENT MAJOR DEPRESSIVE EPISODES, MODERATE (HCC): Primary | ICD-10-CM

## 2022-02-03 DIAGNOSIS — F41.1 GENERALIZED ANXIETY DISORDER: ICD-10-CM

## 2022-02-03 PROCEDURE — G0176 OPPS/PHP;ACTIVITY THERAPY: HCPCS

## 2022-02-03 PROCEDURE — 99212 OFFICE O/P EST SF 10 MIN: CPT | Performed by: PSYCHIATRY & NEUROLOGY

## 2022-02-03 PROCEDURE — G0410 GRP PSYCH PARTIAL HOSP 45-50: HCPCS

## 2022-02-03 PROCEDURE — G0177 OPPS/PHP; TRAIN & EDUC SERV: HCPCS

## 2022-02-03 NOTE — PSYCH
Subjective:     Patient ID: Lorri Child is a 42 y o  female  [facility] Clinical Progress Notes      Specialized Services Documentation  Therapist must complete separate progress note for each specific clinical activity in which the individual participated during the day  GROUP PSYCHOTHERAPY (0930-1030) The group completed a sleep activity that reviewed sleeping soundly suggestions  They explored positive sleep hygiene tips and addressed what to do if they cant sleep or sleep is interrupted  Members received multiple sleep logs to begin to explore and track their sleep patters and identify where they could incorporate the healthy tips  Members were educated on resources for meditations and stretches to implement into their sleep routine to release tension and sleep easier  Silvia Hanna shared that she makes her room conducive to sleep, which is helpful most nights  She recognizes that this could also be overstimulating if she hasn't resolved issues from the day  Lorri Holden continues to make progress towards goals through verbal and activity participation in group  Continue with psychotherapy     TX Plan Objectives: 1 1, 1 2, 1 4   Therapist: Yogesh Rowley MA, LPC

## 2022-02-03 NOTE — PSYCH
Subjective:     Patient ID: Marcia Hwang is a 42 y o  female  [facility] Clinical Progress Notes      Specialized Services Documentation  Therapist must complete separate progress note for each specific clinical activity in which the individual participated during the day  Case Management Note    [facility]    Current suicide risk : Low     INDIVIDUAL PSYCHOTHERAPY    9908-9118 Met with Marcia Hwang  Progress noted in contributions made in group  Barriers continue to be sleep  Discussed "knowing versus doing"  Discussion related to self-validation versus from others  DBT Validation skill and ways to self-validate reviewed along with aspects of a healthy relationship  Discussed boundaries and assignment is to review current situation and write out pros/cons  Medications changes/added/denied? No    Treatment session number: 4    Individual Case Management Visit provided today?  Yes     [facility] follow up physician's orders: see Dr Karolina Beasley MD note

## 2022-02-03 NOTE — PSYCH
Subjective:     Patient ID: Jessica Hua is a 42 y o  female  [facility] Clinical Progress Notes      Specialized Services Documentation  Therapist must complete separate progress note for each specific clinical activity in which the individual participated during the day  Group Psychotherapy (5816-2188) Noris Carias was engaged in an active group around society norms on how we allow them or what other people say to control how we think or feel  Each group member ariana three circles inside each other on a piece of paper, like a bullseye  On the inner [place], they wrote something they feel insecure about, such as "my appearance," "my intelligence," or "being weak "  On the next [place], list phrases or words that have reinforced that insecurity, such as "You're ugly," or "You're slow "  On the next [place], write the names of people who have said those words or phrases to you, such as, "myself," "my mom," or "people at school "  In the four corners of the page, write where you think those people learned those judgments, such as society's expectations for people to look a certain way or to fit into a narrow definition of intelligence  Noris Carias will continue with life skills and psychotherapy groups  Good progress made towards treatment  Tx Plan Objective: 1 1,1 2 Therapist:  CARISA Andrade    GROUP PSYCHOTHERAPY (6357-0440) The group engaged in the wellness assessment, which evaluates progress on several different areas of wellness/wellbeing: physical, emotional, cognitive, vocational, social and spiritual  Clients rated their progress and discussed areas that need work  By completing and discussing areas of progress and challenges, members are connected and reminded that, in their mental health struggle, they are not alone  Topics of discussion revolved around changing perspectives, flow of progress and perfectionism    Noris Carias continues to make progress towards goals through participation in group activity and personal disclosures  Continue with psychotherapy  TX Plan Objectives: 1 1, 1 2, 1 4  Therapist: CARISA Mercer      Education Therapy   4131-3745 Marcia Cordial actively shared in morning assessment and goal review  Presented as Receptive related to readiness to learn  Marcia Cordial did complete goal from last treatment day identifying gaining hope  did not present with any barriers to learning  [phone] Marcia Cordial engaged throughout the treatment day  Was engaged in learning related to Illness, Medication, Aftercare and Wellness Tools  Staff utilized Verbal, Written, A/V and Demonstration teaching methods  Marcia Cordial shared area of learning and set a goal for outside of program to go on a walk on the treadmill        Tx Plan Objective: 1 1,1 2 Therapist:  CARISA Mercer

## 2022-02-03 NOTE — PSYCH
This note was not shared with the patient due to patient requested       Treatment Recommendations- Risks Benefits     Immediate Medical/Psychiatric/Psychotherapy Treatments and Any Precautions:     Continue partial program  Continue Celexa 40 mg p o  Daily  Continue Xanax 0 5 mg p o  T i d  P r n  For severe anxiety  Patient aware of the 24 hour coverage for urgent cases    Risks, Benefits And Possible Side Effects Of Medications:  Risks, benefits, and possible side effects of medications explained to patient and patient verbalizes understanding    Controlled Medication Discussion: Discussed with patient the risks of sedation, respiratory depression, impairment of ability to drive and potential for abuse and addiction related to treatment with benzodiazepine medications  The patient understands risk of treatment with benzodiazepine medications, agrees to not drive if feels impaired and agrees to take medications as prescribed  and The patient has been filling controlled prescriptions on time as prescribed to [facility] program         Assessment/Plan:       Diagnoses and all orders for this visit:    Recurrent major depressive episodes, moderate (HCC)    Generalized anxiety disorder          Subjective:     Patient ID: Kiera Hill is a 42 y o  female with major depressive disorder, generalized anxiety disorder, who who has been participating in the partial program had been evaluated for medication management  She states that she is feeling more comfortable in the program   She states that  last night she was not able to sleep because she did not take anything to sleep other than that she sleeps good  She had good energy and good appetite  She also states that she has decreased intake of alcohol to 3 glasses of wine or 2 shots of vodka 2 to 3 times a week  She states that she does not want to be with her  and he knows that but she still love him    She states that she had been taking medication as prescribed, she has been learning a lot in the program and feel more comfortable sharing things that the 1st day  She denies any suicidal thoughts plans or intent, denies any psychotic symptoms or manic episode  HPI ROS Appetite Changes and Sleep: normal appetite, normal energy level, no weight change and normal number of sleep hours    Review Of Systems:     Mood Anxiety and Depression   Behavior Normal    Thought Content Normal   General Relationship Problems   Personality Normal   Other Psych Symptoms Normal   Constitutional Negative   ENT Negative   Cardiovascular Negative   Respiratory Negative   Gastrointestinal Negative   Genitourinary Negative   Musculoskeletal Negative   Integumentary Negative   Neurological Negative   Endocrine Normal    Other Symptoms Normal              Laboratory Results: No results found for this or any previous visit  Substance Abuse History:  Social History     Substance and Sexual Activity   Drug Use No       Family Psychiatric History:   Family History   Problem Relation Age of Onset    Depression Mother     Osteoporosis Mother     Testicular cancer Father     Cancer Father     Hypertension Father     Cancer Family     Diabetes Family     Heart disease Family     Stroke Family         Stroke syndrome    Alcohol abuse Paternal Grandfather     Mental illness Paternal Grandmother     Drug abuse Neg Hx     Completed Suicide  Neg Hx        The following portions of the patient's history were reviewed and updated as appropriate:   She  has a past medical history of Anxiety, Candidiasis of mouth, Depression, Hypertension, Memory loss (4/7/2021), Obesity, Sciatica, Skin rash, and Urticaria    She   Patient Active Problem List    Diagnosis Date Noted    [facility] disorder [facility] ) 01/25/2022    Upper respiratory tract infection 11/01/2021    Generalized anxiety disorder 09/20/2021    Acute non-recurrent frontal sinusitis 06/25/2021    Memory loss 04/07/2021    Neck pain 04/07/2021    Balance disorder 04/07/2021    Weakness 04/07/2021    History of COVID-19 12/18/2020    Right sided sciatica 07/09/2018    Low back pain 12/21/2017    Recurrent major depressive episodes, moderate ([facility] ) 04/15/2013    Hyperlipidemia 11/13/2012    Hypertension 11/13/2012     She  has a past surgical history that includes Dental surgery  Her family history includes Alcohol abuse in her paternal grandfather; Cancer in her family and father; Depression in her mother; Diabetes in her family; Heart disease in her family; Hypertension in her father; Mental illness in her paternal grandmother; Osteoporosis in her mother; Stroke in her family; Testicular cancer in her father  She  reports that she has never smoked  She has never used smokeless tobacco  She reports current alcohol use of about 9 0 - 12 0 standard drinks of alcohol per week  She reports that she does not use drugs  Current Outpatient Medications   Medication Sig Dispense Refill    ALPRAZolam (XANAX) 0 5 mg tablet Take 1 tablet (0 5 mg total) by mouth 3 (three) times a day as needed (as neded) 90 tablet 0    citalopram (CeleXA) 40 mg tablet Take 1 tablet (40 mg total) by mouth daily 90 tablet 1    ibuprofen (MOTRIN) 200 mg tablet Take by mouth every 6 (six) hours as needed for mild pain      omeprazole (PriLOSEC OTC) 20 MG tablet Take 20 mg by mouth daily      VITAMIN D PO Take 1,000 Units by mouth daily       No current facility-administered medications for this visit  She is allergic to pollen extract         Objective:     Physical Exam        Mental status:  Appearance calm and cooperative , adequate hygiene and grooming and good eye contact    Mood depressed and anxious   Affect affect appropriate    Speech a normal rate   Thought Processes coherent/organized and normal thought processes   Hallucinations no hallucinations present    Thought Content no delusions   Abnormal Thoughts no suicidal thoughts  and no homicidal thoughts    Orientation  oriented to person and place and time   Recent & Remote Memory short term memory intact and long term memory intact   Attention & Concentration Span concentration intact   Intellect Appears to be of Average Intelligence   Fund of Knowledge displays adequate knowledge of current events, adequate fund of knowledge regarding past history and adequate fund of knowledge regarding vocabulary    Insight Insight intact   Judgement judgment was intact   Muscle Strength Muscle strength and tone were normal and Normal gait    Language no difficulty naming common objects, no difficulty repeating a phrase  and no difficulty writing a sentence    Fund of Knowledge displays adequate knowledge of current events, adequate fund of knowledge regarding past history and adequate fund of knowledge regarding vocabulary    Pain none   Pain Scale 0       Gerson Acharya MD

## 2022-02-04 ENCOUNTER — OFFICE VISIT (OUTPATIENT)
Dept: PSYCHOLOGY | Facility: CLINIC | Age: 47
End: 2022-02-04
Payer: COMMERCIAL

## 2022-02-04 DIAGNOSIS — F33.1 RECURRENT MAJOR DEPRESSIVE EPISODES, MODERATE (HCC): Primary | ICD-10-CM

## 2022-02-04 DIAGNOSIS — F41.1 GENERALIZED ANXIETY DISORDER: ICD-10-CM

## 2022-02-04 PROCEDURE — G0410 GRP PSYCH PARTIAL HOSP 45-50: HCPCS

## 2022-02-04 PROCEDURE — G0177 OPPS/PHP; TRAIN & EDUC SERV: HCPCS

## 2022-02-04 PROCEDURE — G0176 OPPS/PHP;ACTIVITY THERAPY: HCPCS

## 2022-02-04 NOTE — PSYCH
Subjective:     Patient ID: Francia Pepper is a 42 y o  female  [facility] Clinical Progress Notes      Specialized Services Documentation  Therapist must complete separate progress note for each specific clinical activity in which the individual participated during the day  Case Management Note    Sj B[facility]    Current suicide risk : Low     INDIVIDUAL PSYCHOTHERAPY  [id number] [phone] [id number] Met with Francia Pepper to discuss weekend plans and supports  Francia Pepper identified learning the importance of self care this week  She reported that she did not want to do program and now she fears leaving  Discussed fear of not applying what has been learned and ways to begin to incorporate  Goals for next week include med check, exploring assignment related to pros/cons of setting boundaries with relationship, exploring readiness for return to work  Medications changes/added/denied? No    Treatment session number: 5    Individual Case Management Visit provided today?  Yes     [facility] follow up physician's orders: na 78

## 2022-02-04 NOTE — PSYCH
Subjective:     Patient ID: Steve Cook is a 42 y o  female  [facility] Clinical Progress Notes      Specialized Services Documentation  Therapist must complete separate progress note for each specific clinical activity in which the individual participated during the day  Group Psychotherapy ([id number])    The group engaged in the wellness assessment, which evaluates progress on several different areas of wellness/wellbeing: physical, emotional, cognitive, vocational, social and spiritual  Clients rated their progress and discussed areas that need work  By completing and discussing areas of progress and challenges, members are connected and reminded that, in their mental health struggle, they are not alone  Topics of discussion revolved around positive experiences within each area of wellness as well as the challenging aspects to wellness within their past week  Steve Cook continues to make progress towards goals through participation in group activity and personal disclosures  Continue with Psychotherapy  Tx Plan Objective: 1 1,1 2, 1 4   Therapist: Gus Haro MS    Education Therapy    [id number] Steve Cook engaged throughout the treatment day  Was engaged in learning related to Illness, Medication, Aftercare and Wellness Tools  Staff utilized Verbal, Written, A/V and Demonstration teaching methods  Steve Cook shared area of learning and set a goal for outside of program to practice a self care skill from the wellness assessment        Tx Plan Objective: 1 1,1 2, 1 4   Therapist: Gus Haro MS

## 2022-02-04 NOTE — PSYCH
Subjective:     Patient ID: Bryant Cordoba is a 42 y o  female  [facility] Clinical Progress Notes      Specialized Services Documentation  Therapist must complete separate progress note for each specific clinical activity in which the individual participated during the day  Group Psychotherapy ([id number]) Eduardkraig Sri was involved in a group that started with a video on a speaker from a yin talk presentation geared around how phones can steal our attention and get us pulled in longer than we attended  Transitioning into an open discussion portion where Shaylee Fierro participated sharing how phones/social media can affect our mood and overall well-being  Boundaries such tracking your time on certain apps was one way to monitor and assess ones own current issues regarding their phone use  Shaylee Fierro will continue with life skills and psychotherapy groups  Good progress made towards treatment  Tx Plan Objective: 1 1,1 2 Therapist:  CARISA Louis    Education Therapy   [id number] Bryant Cordoba actively shared in morning assessment and goal review  Presented as Receptive related to readiness to learn  Bryant Cordoba did complete goal from last treatment day identifying gaining hope  did not present with any barriers to learning       Tx Plan Objective: 1 1,1 2 Therapist:  CARISA Louis

## 2022-02-04 NOTE — PSYCH
Subjective:     Patient ID: Rafael Weldon is a 42 y o  female  [facility] Clinical Progress Notes      Specialized Services Documentation  Therapist must complete separate progress note for each specific clinical activity in which the individual participated during the day  GROUP PSYCHOTHERAPY ([id number]) The group read a mindfulness prayer called No Abreu as a way to encourage members to practice journaling, reflecting, and thinking about gratitude and living in the present  They then spent three minutes reflecting and answered the following questions:     Cleansing Prayer  May I release all energies that are less than love  Help free my mind and body of all that no longer serves me  I send back any energies that arent mine, with love  I anchor into myself and the present moment      1  What feelings/thoughts came up in you as you read the Cleansing Prayer? 2  What do you find to be the most challenging about the Cleansing Prayer? Kamille Saunders seemed very engaged throughout the group session  Kamille Saunders stated "I really liked the concept of feeling free and living in the present moment " Kamille Saunders is encouraged to make progress towards goals and objectives through group participation and will continue to attend psychotherapy group       Tx plan objective: 1 1, 1 2   Therapist: Sridevi Ro MA

## 2022-02-07 ENCOUNTER — OFFICE VISIT (OUTPATIENT)
Dept: PSYCHIATRY | Facility: CLINIC | Age: 47
End: 2022-02-07
Payer: COMMERCIAL

## 2022-02-07 ENCOUNTER — TELEPHONE (OUTPATIENT)
Dept: PSYCHIATRY | Facility: CLINIC | Age: 47
End: 2022-02-07

## 2022-02-07 ENCOUNTER — OFFICE VISIT (OUTPATIENT)
Dept: PSYCHOLOGY | Facility: CLINIC | Age: 47
End: 2022-02-07
Payer: COMMERCIAL

## 2022-02-07 DIAGNOSIS — F33.1 RECURRENT MAJOR DEPRESSIVE EPISODES, MODERATE (HCC): ICD-10-CM

## 2022-02-07 DIAGNOSIS — F33.1 RECURRENT MAJOR DEPRESSIVE EPISODES, MODERATE (HCC): Primary | ICD-10-CM

## 2022-02-07 DIAGNOSIS — F39 MOOD DISORDER (HCC): Primary | ICD-10-CM

## 2022-02-07 DIAGNOSIS — F41.1 GENERALIZED ANXIETY DISORDER: ICD-10-CM

## 2022-02-07 PROCEDURE — G0410 GRP PSYCH PARTIAL HOSP 45-50: HCPCS

## 2022-02-07 PROCEDURE — G0176 OPPS/PHP;ACTIVITY THERAPY: HCPCS

## 2022-02-07 PROCEDURE — 99213 OFFICE O/P EST LOW 20 MIN: CPT | Performed by: NURSE PRACTITIONER

## 2022-02-07 PROCEDURE — G0177 OPPS/PHP; TRAIN & EDUC SERV: HCPCS

## 2022-02-07 NOTE — PSYCH
This note was not shared with the patient due to reasonable likelihood of causing patient harm    PHP MEDICATION MANAGEMENT NOTE        Diego Carmona    Name and Date of Birth:  Jessica Hua 42 y o  1975 MRN: [id number]    Date of Visit: February 7, 2022    Allergies   Allergen Reactions    Pollen Extract Allergic Rhinitis     SUBJECTIVE:    Noris Carias is seen today for a follow up for Major Depressive Disorder, mood disorder and Generalized Anxiety Disorder  She reports that she has improved slightly since beginning PHP  States she is feeling more positively about the PHP program   SI is less frequent  No plan or intent  Sleep remains poor and has low energy  Happy with current medication regimen  Fearful of medication side effects  Adequate supply medication  Patient is contemplating discharge versus requesting more time for IOP the end of this week  She denies any side effects from medications  PLAN:  Continue citalopram 40 mg p o   Daily  Aware of 24 hour and weekend coverage for urgent situations accessed by calling [facility] main practice number  Continue partial hospitalization program    Diagnoses and all orders for this visit:    [facility] ([facility] )    Generalized anxiety disorder    Recurrent major depressive episodes, moderate ([facility] )        Current Outpatient Medications on File Prior to Visit   Medication Sig Dispense Refill    ALPRAZolam (XANAX) 0 5 mg tablet Take 1 tablet (0 5 mg total) by mouth 3 (three) times a day as needed (as neded) 90 tablet 0    citalopram (CeleXA) 40 mg tablet Take 1 tablet (40 mg total) by mouth daily 90 tablet 1    ibuprofen (MOTRIN) 200 mg tablet Take by mouth every 6 (six) hours as needed for mild pain      omeprazole (PriLOSEC OTC) 20 MG tablet Take 20 mg by mouth daily      VITAMIN D PO Take 1,000 Units by mouth daily       No current facility-administered medications on file prior to visit  Psychotherapy Provided:     Individual psychotherapy provided: Yes  Counseling was provided during the session today for 16 minutes  Supportive counseling provided  HPI ROS Appetite Changes and Sleep:     She reports difficulty falling asleep, frequent awakenings, increased appetite, low energy   Denies homicidal ideation, denies suicidal ideation    Review Of Systems:      General emotional problems and decreased functioning   Personality no change in personality   Constitutional negative   ENT negative   Cardiovascular negative   Respiratory negative   Gastrointestinal negative   Genitourinary negative   Musculoskeletal negative   Integumentary negative   Neurological negative   Endocrine negative   Other Symptoms none, all other systems are negative     Mental Status Evaluation:    Appearance Adequate hygiene and grooming   Behavior cooperative and Superficial   Mood euthymic  Depression Scale -  of 10 (0 = No depression)  Anxiety Scale -  of 10 (0 = No anxiety)   Speech Normal rate and volume   Affect appropriate and mood-congruent   Thought Processes Goal directed and coherent   Thought Content Does not verbalize delusional material   Associations Tightly connected   Perceptual Disturbances Denies hallucinations and does not appear to be responding to internal stimuli   Risk Potential Suicidal/Homicidal Ideation - No evidence of suicidal or homicidal ideation and patient does not verbalize suicidal or homicidal ideation  Risk of Violence - No evidence of risk for violence found on assessment  Risk of Self Mutilation - No evidence of risk for self mutilation found on assessment   Orientation oriented to person, place, time/date and situation   Memory recent and remote memory grossly intact   Consciousness alert and awake   Attention/Concentration attention span and concentration are age appropriate   Insight partial   Judgement partial   Muscle Strength and Gait normal muscle strength and normal muscle tone, normal gait/station and normal balance   Motor Activity no abnormal movements   Language no difficulty naming common objects, no difficulty repeating a phrase, no difficulty writing a sentence   Fund of Knowledge adequate knowledge of current events  adequate fund of knowledge regarding past history  adequate fund of knowledge regarding vocabulary      Past Psychiatric History Update:     Inpatient Psychiatric Admission Since Last Encounter:   no  Suicide Attempt Or Self Mutilation Since Last Encounter:   no  Incidence of Violent Behavior Since Last Encounter:   no    Traumatic History Update:     New Onset of Abuse Since Last Encounter:   no  Traumatic Events Since Last Encounter:   no    Past Medical History:    Past Medical History:   Diagnosis Date    Anxiety     Candidiasis of mouth     Last Assessed [id number]    Depression     Hypertension     Memory loss 4/7/2021    Obesity     Sciatica     Last Assessed [id number]    Skin rash     Last Assessed [id number]    Urticaria     Last Assessed [id number]     Past Medical History Pertinent Negatives:   Diagnosis Date Noted    Allergic 08/14/2018    Anemia 08/14/2018    Arthritis 08/14/2018    Asthma 08/14/2018    Cancer ([facility] ) 08/14/2018    Chronic kidney disease 08/14/2018    Clotting disorder [facility] 08/14/2018    COPD (chronic obstructive pulmonary disease) ([facility] ) 08/14/2018    Coronary artery disease 08/14/2018    Dementia ([facility] ) 08/14/2018    Diabetes mellitus ([facility] ) 08/14/2018    Disease of thyroid gland 08/14/2018    Diverticulitis of colon 08/14/2018    Eating disorder 08/14/2018    GERD (gastroesophageal reflux disease) 08/14/2018    Head injury 01/27/2022    Heart murmur 08/14/2018    HL (hearing loss) 08/14/2018    Infectious viral hepatitis 08/14/2018    Inflammatory bowel disease 08/14/2018    Kidney stone 08/14/2018    Myocardial infarction ([facility] ) 08/14/2018    Osteoporosis 08/14/2018    Otitis media 08/14/2018  Pneumonia 08/14/2018    Scoliosis 08/14/2018    Seizures ([facility] ) 08/14/2018    Shingles 08/14/2018    Stroke ([place] [address] ) 08/14/2018    Substance abuse ([place] [address] ) 08/14/2018    Urinary tract infection 08/14/2018    Visual impairment 08/14/2018     Past Surgical History:   Procedure Laterality Date    DENTAL SURGERY       Allergies   Allergen Reactions    Pollen Extract Allergic Rhinitis     Substance Abuse History:    Social History     Substance and Sexual Activity   Alcohol Use Yes    Alcohol/week: 9 0 - 12 0 standard drinks    Types: 9 - 12 Glasses of wine per week    Comment: Social     Social History     Substance and Sexual Activity   Drug Use No     Social History:    Social History     Socioeconomic History    Marital status: /Civil Union     Spouse name: Not on file    Number of children: Not on file    Years of education: Not on file    Highest education level: Bachelor's degree (e g , BA, AB, BS)   Occupational History    Occupation: Visiting RN   Tobacco Use    Smoking status: Never Smoker    Smokeless tobacco: Never Used   Vaping Use    Vaping Use: Never used   Substance and Sexual Activity    Alcohol use:  Yes     Alcohol/week: 9 0 - 12 0 standard drinks     Types: 9 - 12 Glasses of wine per week     Comment: Social    Drug use: No    Sexual activity: Yes   Other Topics Concern    Not on file   Social History Narrative        Employed - [employer] Full Time    Lives with spouse     Caffeine use    Uses safety equipment: seat belts     Social Determinants of Health     Financial Resource Strain: Not on file   Food Insecurity: Not on file   Transportation Needs: Not on file   Physical Activity: Not on file   Stress: Not on file   Social Connections: Not on file   Intimate Partner Violence: Not on file   Housing Stability: Not on file     Family Psychiatric History:     Family History   Problem Relation Age of Onset    Depression Mother     Osteoporosis Mother     Testicular cancer Father     Cancer Father     Hypertension Father     Cancer Family     Diabetes Family     Heart disease Family     Stroke Family         Stroke syndrome    Alcohol abuse Paternal Grandfather     Mental illness Paternal Grandmother     Drug abuse Neg Hx     Completed Suicide  Neg Hx      History Review: The following portions of the patient's history were reviewed and updated as appropriate: allergies, current medications, past family history, past medical history, past social history, past surgical history and problem list     OBJECTIVE:     Vital signs in last 24 hours: There were no vitals filed for this visit  Laboratory Results: I have personally reviewed all pertinent laboratory/tests results  Medications Risks/Benefits:      Risks, Benefits And Possible Side Effects Of Medications:    Discussed risks and benefits of treatment with patient including risk of suicidality, serotonin syndrome, increased QTc interval and SIADH related to treatment with antidepressants; Risk of induction of manic symptoms in certain patient populations and risks of misuse, abuse or dependence, sedation and respiratory depression related to treatment with benzodiazepine medications     Controlled Medication Discussion:     Manjinder Contreras has been filling controlled prescriptions on time as prescribed according to [address] 02/07/22    This note was shared with patient

## 2022-02-07 NOTE — PSYCH
Subjective:     Patient ID: Yan Elizondo is a 42 y o  female  [facility] Clinical Progress Notes      Specialized Services Documentation  Therapist must complete separate progress note for each specific clinical activity in which the individual participated during the day  GROUP PSYCHOTHERAPY ([id number])   The group engaged in open discussions that allowed for conversations on the following topics; personal disclosures, anxiousness, coping skills to manage racing thoughts and understanding treatment  Ino Zambrano discussed with other members her concerns with her racing thoughts  Ino Zambrano engaged in different group discussions to encourage and support other members  Ino Zambrano demonstrated progress towards goals and objectives through group participation  Continue with psychotherapy  TX Plan Objectives: 1 1, 1 2, 1 4   Therapist: Whitley Polanco MA, [facility]       Education Therapy   [id number] Yan Elizondo actively shared in morning assessment and goal review  Presented as Receptive related to readiness to learn  Yan Elizondo did complete goal from last treatment day identifying gaining peace  did not present with any barriers to learning  [id number] Yan Elizondo engaged throughout the treatment day  Was engaged in learning related to Illness, Medication, Aftercare and Wellness Tools  Staff utilized Verbal, Written, A/V and Demonstration teaching methods  Yan Elizondo shared area of learning and set a goal for outside of program to gym and do one activity for self        Tx Plan Objective: 1 1, 1 2, 1 4 Therapist:  Whitley Polanco MA, [facility]

## 2022-02-07 NOTE — PSYCH
Subjective:     Patient ID: Linn Stubbs is a 42 y o  female  [facility] Clinical Progress Notes      Specialized Services Documentation  Therapist must complete separate progress note for each specific clinical activity in which the individual participated during the day  Group Psychotherapy ([id number]) Bretyoni Hollingsworth engaged in a refresher of FABIAN E A R  M A N  interpersonal skill before moving into the G I V E  skill   Group went through and discussed the acronym G I V E  which stands for: G: Gentle; I: Interested; V: Validate; and E: Easy Manner and discussed how it intertwines with the D E A R  M A N  skill for positive communication   Group discussed the importance of these skills and how they can improve in their own communication skills   Shun Wakefieldestevan will continue with life skills and psychotherapy groups   Good progress made towards treatment     Tx Plan Objective: 1 1,1 2 Therapist:  CARISA Valdez

## 2022-02-07 NOTE — TELEPHONE ENCOUNTER
Patient called the office on 02/04/2022 at 1:59 pm to cancel her appointment for 02/07/2022 due to still being in partial  Left patient a message letting her know the message was received and canceled

## 2022-02-07 NOTE — PSYCH
Subjective:     Patient ID: Tenisha Real is a 42 y o  female  [facility] Clinical Progress Notes      Specialized Services Documentation  Therapist must complete separate progress note for each specific clinical activity in which the individual participated during the day  Case Management Note    Maggie Hernandez [facility]    Current suicide risk : Low     INDIVIDUAL PSYCHOTHERAPY  This case management session was facilitated virtually in a private office using HIPAA Compliant and Approved [employer] Teams  Tenisha Real consented to the use of tele-video modality of treatment  [id number] Met with Tenisha Real  Reported that she had a good weekend however her sleep remains poor  She struggled to work on consistent self care which remains the target for this week  She did not spend time working on assignment related to pros/cons and boundaries  Discussed realistic ways to implement program into her life  Medications changes/added/denied? No    Treatment session number: 6    Individual Case Management Visit provided today? Yes     [facility] follow up physician's orders: see S   Francisco Finn NP note

## 2022-02-07 NOTE — PSYCH
Subjective:     Patient ID: Ericka Julian is a 42 y o  female  [facility] Clinical Progress Notes      Specialized Services Documentation  Therapist must complete separate progress note for each specific clinical activity in which the individual participated during the day  Allied Therapy    [id number]--  Ericka Julian actively shared and participated in [facility] group focused on learning about what archetypes are, the purpose of survival archetypes,  the dark and light qualities of each archetype, and how each survival archetype relates to their life  Group focused on the prostitute archetype (our integrity and values we sacrifice for earthly gains such as approval, finances, or comfortability) as well as the 8 inner child archetypes  Group explored archetypes through jaydon analysis, active discussion, and utilizing guided worksheets  Ericka Julian  identified that she noticed within the prostitute archetype how she utilizes social media for validation and popularity from others  Some effort noted toward treatment goal   Continue AT to encourage the development and practice of utilizing their guided worksheet of questions to help them move from the dark or shadow qualities of an archetype to the light qualities    Tx Plan Objective: 1 1, 1 2, & 1 4            Therapist: DELMA Comer

## 2022-02-08 ENCOUNTER — OFFICE VISIT (OUTPATIENT)
Dept: PSYCHOLOGY | Facility: CLINIC | Age: 47
End: 2022-02-08
Payer: COMMERCIAL

## 2022-02-08 DIAGNOSIS — F41.1 GENERALIZED ANXIETY DISORDER: ICD-10-CM

## 2022-02-08 DIAGNOSIS — F33.1 RECURRENT MAJOR DEPRESSIVE EPISODES, MODERATE (HCC): Primary | ICD-10-CM

## 2022-02-08 PROCEDURE — G0176 OPPS/PHP;ACTIVITY THERAPY: HCPCS

## 2022-02-08 PROCEDURE — G0177 OPPS/PHP; TRAIN & EDUC SERV: HCPCS

## 2022-02-08 PROCEDURE — G0410 GRP PSYCH PARTIAL HOSP 45-50: HCPCS

## 2022-02-08 NOTE — PSYCH
Subjective:     Patient ID: Jessica Hua is a 42 y o  female  [facility] Clinical Progress Notes      Specialized Services Documentation  Therapist must complete separate progress note for each specific clinical activity in which the individual participated during the day  Allied Therapy   [id number]--   Jessica Hua actively shared in [facility] group focused on the topic of baggage, the healthy and unhealthy ways people carry baggage, and how to let go of our baggage  Jessica Hua engaged in [place], music and art, and group discussion  Group explored practice of identifying signs of mental unwellness, identifying what baggage they need to let go of and what qualities they need to develop or grow in to be able to process through and let go of their baggage  Each individual was given a pack of post-its and encouraged to write down events, feelings, etc  that they need to let go of  Group members were given the option to write down a quality on a heart that they need to grow or develop in that can help them overcome their baggage as well as as their first action step to developing the quality  Jessica Hua typically mirrored or repeated what the therapist previously stated in a different way  Noris Carias recognized she feels like needs to work on being able to validate herself without relying on others as a way to begin to work on releasing her baggage  Some effort noted toward treatment goal   Continue AT to encourage the development and practice of awareness related to our emotions and past experiences to alleviate symptoms and support wellness    Tx Objective: 1 1, 1 2, & 1 4      Therapist Signature: DELMA Michel

## 2022-02-08 NOTE — PSYCH
Subjective:     Patient ID: Giorgio Hollingsworth is a 42 y o  female  [facility] Clinical Progress Notes      Specialized Services Documentation  Therapist must complete separate progress note for each specific clinical activity in which the individual participated during the day  Case Management Note    Elba NGUYỄN    Current suicide risk : Low     Not a case management day for Giorgio Hollingsworth today  Did not reach out with any additional questions and concerns and aware of next scheduled 1:1       Medications changes/added/denied? No    Treatment session number: 7    Individual Case Management Visit provided today?  No    [facility] follow up physician's orders: na

## 2022-02-08 NOTE — PSYCH
Subjective:     Patient ID: Tenisha Real is a 42 y o  female  [facility] Clinical Progress Notes      Specialized Services Documentation  Therapist must complete separate progress note for each specific clinical activity in which the individual participated during the day  Psychotherapeutic Group ([id number])    The group learned several Distress Tolerance skills in relation to DBT  Cost/benefit analysis of behaviors, the RESISTT acronym, and a recap of TIP was presented to the group  They actively learned/participated by completing two written exercises paired with discussion  The group discussed personal perspectives and opinions on the subject  Tenisha Real personally participated/contributed by completing the worksheet, sharing insights, and promoting discussion  They are actively working towards their treatment goals  Continue with PHP       Tx Plan Objective: 1 1,1 2, 1 4   Therapist: Peyton Dang MS

## 2022-02-09 ENCOUNTER — OFFICE VISIT (OUTPATIENT)
Dept: PSYCHOLOGY | Facility: CLINIC | Age: 47
End: 2022-02-09
Payer: COMMERCIAL

## 2022-02-09 DIAGNOSIS — F33.1 RECURRENT MAJOR DEPRESSIVE EPISODES, MODERATE (HCC): ICD-10-CM

## 2022-02-09 DIAGNOSIS — F41.1 GENERALIZED ANXIETY DISORDER: Primary | ICD-10-CM

## 2022-02-09 PROCEDURE — G0410 GRP PSYCH PARTIAL HOSP 45-50: HCPCS

## 2022-02-09 PROCEDURE — G0177 OPPS/PHP; TRAIN & EDUC SERV: HCPCS

## 2022-02-09 PROCEDURE — G0176 OPPS/PHP;ACTIVITY THERAPY: HCPCS

## 2022-02-09 NOTE — PSYCH
Assessment/Plan:      Diagnoses and all orders for this visit:    Generalized anxiety disorder    Recurrent major depressive episodes, moderate (HCC)          Subjective:     Patient ID: Marcia Hwang is a 42 y o  female  [facility] Treatment Plan   AREAS OF NEED: Depression as evidenced by lack of energy, hopelessness, decreased concentration, anhedonia, agitation, sleep disturbance, excessive guilt, limited connections with others related to marital stressors and work/school stressors  Date Initiated: 1/27/2022    Strengths: work ethic, love of animals     LONG TERM GOAL:   Date Initiated: 1/27/2022  1 0 I will identify 3 signs that my depression has lessened and I feel more hopeful about my future  Target Date: 2/24/2022  Completion Date:       SHORT TERM OBJECTIVES:     Date Initiated: 1/27/2022  1 1 I will identify engaging in at least 1 pathway of hope daily - "normal routine, connection, spirituality, goal setting, and self-nurturing"  Revision Date: continue to work on 2/9/2022  Target Date: 2/9/2022  Completion Date:     Date Initiated: 1/27/2022  1 2 I will identify 3 things I can incorporate into my life for self care (outside of work and school)  Revision Date:  continue to work on 2/9/2022  Target Date: 2/9/2022  Completion Date:     Date Initiated: 1/27/2022  1 3 I will take medications as prescribed and share questions and concerns if arise  Revision Date: continue 2/9/2022  Target Date: 2/9/2022  Completion Date:     Date Initiated: 1/27/2022  1 4 I will identify 3 resources I can use for suppports     Revision Date: continue 2/9/2022  Target Date: 2/9/2022  Completion Date:          7 DAY REVISION:    Date Initiated:2/9/2022  1 5 I will identify 3 aspects of care I would like to continue to implement daily into my routine - practicing daily    Revision Date:   Target Date: 2/21/2022  Completion Date:    Date Initiated:2/9/2022  1 6 I will begin to formulate a plan for my next steps in my relationship through exploring pros/cons and my values  Revision Date:   Target Date: 2/21/2022  Completion Date:    PSYCHIATRY:  Date Initiated: 1/27/2022  Medication Management and Education       Revision Date: 2/9/2022        Continue medication management   The person(s) responsible for carrying out the plan is Rebekah Mtz MD; Nikia Faith NP    NURSING/SYMPTOM EDUCATION:  Date Initiated: 1/27/2022  1 1, 1 2  1 3, 1 4 This RN will provide wellness/symptoms and skill education groups three to five days weekly to educate Jessica Hua on signs and symptoms of diagnoses, skills to manage, and medication questions that will be addressed by the treatment team     Revision date: 2/9/2022  1 1,1 2,1 3,1 4,1 5 Continue to encourage Jessica Hua to participate in wellness groups daily to learn about symptoms, coping strategies and warning signs to promote relapse prevention  The person(s) responsible for carrying out the plan is CARISA Andrade, Delia Cooper, MS    PSYCHOLOGY:   Date Initiated: 1/27/2022       1 1, 1 2, 1 4 Provide psychotherapy group 5 times per week to allow opportunity for Jessica Hua  to explore stressors and ways of coping  Revision Date: 2/9/2022  1 1,1 2,1 4,1 5 Continue to provide psychotherapy group daily to Damian Howard encourage sharing of stressors, skills and positive change  The person(s) responsible for carrying out the plan is Alicia Crawley MA, [facility]; Farooq Jackson    ALLIED THERAPY:   Date Initiated: 1/27/2022  1 1,1 2 Engage Obdulio Gloria in AT group 5 times daily to encourage development and use of wellness tools to decrease symptoms and promote recovery through meaningful activity  Revision Date: 2/9/2022  1 1,1 2,1 5 Continue to engage Jessica Hua to participate in AT group to practice wellness tools within program and transfer to home sharing successes and barriers through healthy task involvement     The person(s) responsible for carrying out the plan is DELMA Ascencio     CASE MANAGEMENT:   Date Initiated: 1/27/2022      1 0 This  will meet with Marcia Hwang  3-4 times weekly to assess treatment progress, discharge planning, connection to community supports and UR as indicated  Revision Date: 2/9/2022  1 0 Continue to meet with Marcia Hwang 3-4 times weekly to assess growth, work toward goals, continued treatment needs, dc planning and use of supports  The person(s) responsible for carrying out the plan is Any NGUYỄN      TREATMENT REVIEW/COMMENTS:     DISCHARGE CRITERIA: Identify 3 signs of progress and complete relapse prevention plan  DISCHARGE PLAN: Op care  Estimated Length of Stay:10 treatment days           Diagnosis and Treatment Plan explained to Maribel Earl relates understanding diagnosis and is agreeable to Treatment Plan           CLIENT COMMENTS / Please share your thoughts, feelings, need and/or experiences regarding your treatment plan: _____________________________________________________________________________________________________________________________________________________________________________________________________________________________________________________________________________________________________________________ Date/Time: ______________     Patient Signature: _________________________________     Date/Time: ______________      Signature: _________________________________     Date/Time: ______________
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
## 2022-02-09 NOTE — PSYCH
Subjective:     Patient ID: Ericka Julian is a 42 y o  female  [facility] Clinical Progress Notes      Specialized Services Documentation  Therapist must complete separate progress note for each specific clinical activity in which the individual participated during the day  Case Management Note    Lidya Parra [facility]    Current suicide risk : Low     INDIVIDUAL PSYCHOTHERAPY    [id number] Met with Ericka Jluian  Reported feeling very overwhelmed and "angry" related to work paperwork  She was able to express needs and fears  FMLA paperwork and STD paperwork is completed  She struggles with belief that she can continue to really work on her self once she leaves program   Bijan Dougherty shared confusion related to her marriage and tearfulness  Treatment plan reviewed and updated based on these identified needs - copy provided  Medications changes/added/denied? No    Treatment session number: 8    Individual Case Management Visit provided today?  Yes     [facility] follow up physician's orders: na

## 2022-02-09 NOTE — PSYCH
Subjective:     Patient ID: Francia Pepper is a 42 y o  female  [facility] Clinical Progress Notes      Specialized Services Documentation  Therapist must complete separate progress note for each specific clinical activity in which the individual participated during the day  Allied Therapy   [id number]--  Francia Pepper actively shared in [facility] group focused on how to make balanced choices of familiar versus unfamiliar when confronting unmanageable situations on various degrees  Francia Pepper  engaged in the group by actively playing instruments, actively listening to other group members, and contributing insight into discussion  Group explored practice of active music making/improvisation to explore the differences between remaining on a steady familiar path vs creating our own rhythms parallel to the outside world in how are we making balanced choices  Francia Pepper identified an unmanageable situation within her life is feeling invalidated or unnoticed within her work environment for her accomplishments  Continue AT to encourage the development and practice of mindfulness strategies to alleviate symptoms and support wellness  Tx Plan Objective: 1 1, 1 2, 1 4   Therapist:  DELMA Cooney     Education Therapy   [id number] Francia Pepper actively shared in morning assessment and goal review  Presented as Receptive related to readiness to learn  Francia Pepper did complete goal from last treatment day identifying gaining education and advocacy  did not present with any barriers to learning  [id number] Francia Pepper engaged throughout the treatment day  Was engaged in learning related to Illness, Medication, Aftercare and Wellness Tools  Staff utilized Verbal, Written, A/V and Demonstration teaching methods  Francia Pepper shared area of learning and set a goal for outside of program to complete a school assignment        Tx Plan Objective: 1 2, 1 2, 1 4   Therapist:  Celso Elizabeth MT-BC

## 2022-02-09 NOTE — PSYCH
PT DAILY TREATMENT NOTE    Patient Name: Sandra Zhao  Date:2019  : 1976  [x]  Patient  Verified  Payor: Christiana Reese / Plan: [id number] HMO / Product Type: HMO /    In time:1030  Out time:1120  Total Treatment Time (min): 50  Total Timed Codes (min): 45  1:1 Treatment Time ( only): NA   Visit #: 21 of 28    Treatment Area: Unspecified abnormalities of gait and mobility [R26.9]    SUBJECTIVE  Pain Level (0-10 scale): 0/10  Any medication changes, allergies to medications, adverse drug reactions, diagnosis change, or new procedure performed?: [x] No    [] Yes (see summary sheet for update)  Subjective functional status/changes:   [] No changes reported  Patient pleasant, reports no changes    OBJECTIVE      45 min Therapeutic Exercise:  [] See flow sheet :   Rationale: increase ROM, increase strength, improve coordination, improve balance and increase proprioception to improve the patients ability to restore PLOF            With   [x] TE   [] TA   [] neuro   [] other: Patient Education: [x] Review HEP    [] Progressed/Changed HEP based on:   [] positioning   [] body mechanics   [] transfers   [] heat/ice application    [] other:      Other Objective/Functional Measures: improved strength, improved exercise tolerance     Pain Level (0-10 scale) post treatment: 0/10    ASSESSMENT/Changes in Function: Patient tolerated treatment session well today. No complaints today. He is making good progress and has good tolerance for therex. Patient will continue to benefit from skilled PT services to modify and progress therapeutic interventions, address functional mobility deficits, address ROM deficits, analyze and address soft tissue restrictions, analyze and cue movement patterns, analyze and modify body mechanics/ergonomics and assess and modify postural abnormalities to attain remaining goals.      []  See Plan of Care  []  See progress note/recertification  [x]  See Discharge Summary         Progress towards Subjective:     Patient ID: Steve Cook is a 42 y o  female  [facility] Clinical Progress Notes      Specialized Services Documentation  Therapist must complete separate progress note for each specific clinical activity in which the individual participated during the day  Group Psychotherapy ([id number]) Steve Friedman engaged in a group where we discussed the importance of movement in regard to our holistic health and wellness  Talking about how mental health and physical health are connected  After the processing Steve Friedman engaged in a physical activity of chair yoga focusing on Mindfulness and body awareness  Steve Friedman will continue with life skills and psychotherapy groups  Good progress made towards treatment   Tx Plan Objective: 1 1,1 2 Therapist:  CARISA Stinson goals / Updated goals:  Short Term Goals: To be accomplished in 3 weeks:  1. Patient will be compliant with HEP to progress toward goals and restore functional mobility. Eval Status: issued this visit  Current: Patient still not compliant and is to discuss priority exercises for HEP with clinician next session. 8/22/19     2. Patient will improve FOTO score by 7 points to improve functional tolerance for exercise. Eval Status:FOTO 45  Current: 45 7/25/19     3. Pt will tolerate 10 minutes of standing therex during therapy session without seated rest break. Eval Status: pt able to tolerate 1 standing exercise prior to needing seated rest break  Current:Pt able to tolerate 1-2 standing exercises before rest break 7/29/19      Long Term Goals: To be accomplished in 6 weeks:  1. Patient will be independent with HEP to progress toward goals of prolonged ambulation with LRAD. Eval Status: issued at Eval  Current: Pt reports very little participation with HEP 7/29/19     2. Patient will improve FOTO score by 13 points to improve functional tolerance for prolonged ambulation of LRAD. Eval Status: FOTO 45  Current: 48 SLIGHT IMPROVEMENT 6-25-19        3. Patient will have 4+/5 bilateral LE strength to return to goals of prolonged ambulation. Eval Status:   Hip L (1-5) R (1-5) L  7/29/19 R      Hip Flexion 3+/5 3+/5 4/5 4/5   Hip Ext     4/5 4/5   Hip ABD 4/5 4/5 4+/5 4+/5   Hip ADD 4/5 4/5 4/5 4/5   Hip ER 3+/5 3+/5 4-/5 4-/5   Hip IR 3+/5 3+/5 4-/5 4-/5      Knee L (1-5) R (1-5) L  7/29/19 R  7/29/19   Knee Flexion 3+/5 3+/5 4+/5 4+/5   Knee Extension 3+/5 3+/5 4+/5 4+/5   Ankle PF           Ankle DF           Other                  Current: See Above 7/29/19      PLAN  [x]  Upgrade activities as tolerated     [x]  Continue plan of care  []  Update interventions per flow sheet       []  Discharge due to:_  []  Other:_      Cynthia Velarde, PT 8/29/2019  10:53 AM    No future appointments.

## 2022-02-10 ENCOUNTER — OFFICE VISIT (OUTPATIENT)
Dept: PSYCHOLOGY | Facility: CLINIC | Age: 47
End: 2022-02-10
Payer: COMMERCIAL

## 2022-02-10 DIAGNOSIS — F33.1 RECURRENT MAJOR DEPRESSIVE EPISODES, MODERATE (HCC): ICD-10-CM

## 2022-02-10 DIAGNOSIS — F41.1 GENERALIZED ANXIETY DISORDER: Primary | ICD-10-CM

## 2022-02-10 PROCEDURE — G0410 GRP PSYCH PARTIAL HOSP 45-50: HCPCS

## 2022-02-10 PROCEDURE — G0176 OPPS/PHP;ACTIVITY THERAPY: HCPCS

## 2022-02-10 PROCEDURE — G0177 OPPS/PHP; TRAIN & EDUC SERV: HCPCS

## 2022-02-10 NOTE — PSYCH
Subjective:     Patient ID: David Martinez is a 42 y o  female  [facility] Clinical Progress Notes      Specialized Services Documentation  Therapist must complete separate progress note for each specific clinical activity in which the individual participated during the day  Group Psychotherapy ([id number]) Ken Mora was engaged in a DBT House of Treatment activity  Where the house identifies different areas of treatment starting from the foundation where Ken Mora labeled their values that guide their life  Next, along the outside of their house each group member wrote down anyone who supports them, following with writing down on the roof things or people that protect them  Second, each group member wrote down on their door things you keep hidden from others  Third, on the chimney they were asked to write down ways in which you blow off steam   Fourth, they made billboard sign in their yard and wrote things that they are proud of and or wanted others to see  Inside each group members home, they were asked to draw four different levels:  Level 1: Things or behaviors you are trying to gain control over, or areas of your life you want to change  Level 2: Write or draw things that you want to experience more often or just in a healthier way  Level 3: Draw or write things you feel happy about or would like to even feel happier about  Level 4: On this layer you would write down or draw things to resemble what a, Life Yazmin Hearn looks like for you  After the houses were finished the group opened and shared areas that were difficult when creating their house, areas they had strengths in, and areas that they want to work on  Ken Mora will continue with life skills and psychotherapy groups  Good progress made towards treatment   Tx Plan Objective: 1 1,1 2 Therapist:  CARISA Damon

## 2022-02-10 NOTE — PSYCH
Subjective:     Patient ID: Mervin Euceda is a 42 y o  female  [facility] Clinical Progress Notes      Specialized Services Documentation  Therapist must complete separate progress note for each specific clinical activity in which the individual participated during the day  Case Management Note    Perlita Viera [facility]    Current suicide risk : Low     INDIVIDUAL PSYCHOTHERAPY    [id number] Met with Mervin Euceda  She was less tearful today  Given values assignment to attach to pros/cons  Continues to voice gains  FMLA faxed to employer HR  Medications changes/added/denied? No    Treatment session number: 9    Individual Case Management Visit provided today?  Yes     [facility] follow up physician's orders: na

## 2022-02-10 NOTE — PSYCH
Subjective:     Patient ID: Charanjit Landeros is a 42 y o  female  [facility] Clinical Progress Notes      Specialized Services Documentation  Therapist must complete separate progress note for each specific clinical activity in which the individual participated during the day  Psychotherapeutic Group ([id number])    The group learned about all 15 cognitive distortions  They gained a further understanding about the way they think irrationally and discovered ways to address those instincts  We went over a lot of information on distortions; examples, definitions, consequences, and ways to identify/change cognitive distortions when they occur  The group was provided a worksheet with all 15 cognitive distortions and their definitions  Media in the form of a YouTube video regarding the cognitive distortion of "catastrophizing" was played and discussed in depth  The video was an excellent short talk on how to recognize catastrophizing, the consequences of not addressing it, and how to combat it in the future  Charanjit Landeros was an active participant in this group via sharing multiple anecdotes, insights, and actively listening while promoting discussion        Tx Plan Objective: 1 1,1 2, 1 4   Therapist: Danielle Farr MS

## 2022-02-10 NOTE — PSYCH
Subjective:     Patient ID: Keenan Boothe is a 42 y o  female  [facility] Clinical Progress Notes      Specialized Services Documentation  Therapist must complete separate progress note for each specific clinical activity in which the individual participated during the day  GROUP PSYCHOTHERAPY ([id number])   The group engaged in psycho-education on triggers, warning signs and grounding techniques, along with the Wellness Recovery and Action (WRAP) and the DBT skill Pros and Cons  Members utilized the WRAP and open discussions to identify warning signs and create an action plan for future instances  Charmelida Learn shared her warning signs and expressed distressing situations  Charls Learn demonstrated progress towards goals and objectives through group participation  Continue with psychotherapy  TX Plan Objectives: 1 1, 1 2, 1 4   Therapist: Kayleigh Marie MA, [facility]      Education Therapy   [id number] Keenan Boothe actively shared in morning assessment and goal review  Presented as Receptive related to readiness to learn  Keenan Boothe did complete goal from last treatment day identifying gaining responsibility and self-care  did not present with any barriers to learning  [id number] Keenan Boothe engaged throughout the treatment day  Was engaged in learning related to Illness, Medication, Aftercare and Wellness Tools  Staff utilized Verbal, Written, A/V and Demonstration teaching methods  Keenan Boothe shared area of learning and set a goal for outside of program to do a journal entry        Tx Plan Objective: 1 1, 1 2, 1 4 Therapist:  Kayleigh Marie MA, [facility]

## 2022-02-11 ENCOUNTER — OFFICE VISIT (OUTPATIENT)
Dept: PSYCHOLOGY | Facility: CLINIC | Age: 47
End: 2022-02-11
Payer: COMMERCIAL

## 2022-02-11 ENCOUNTER — OFFICE VISIT (OUTPATIENT)
Dept: PSYCHIATRY | Facility: CLINIC | Age: 47
End: 2022-02-11
Payer: COMMERCIAL

## 2022-02-11 DIAGNOSIS — F41.1 GENERALIZED ANXIETY DISORDER: ICD-10-CM

## 2022-02-11 DIAGNOSIS — F33.1 RECURRENT MAJOR DEPRESSIVE EPISODES, MODERATE (HCC): ICD-10-CM

## 2022-02-11 DIAGNOSIS — F41.1 GENERALIZED ANXIETY DISORDER: Primary | ICD-10-CM

## 2022-02-11 DIAGNOSIS — F39 MOOD DISORDER (HCC): ICD-10-CM

## 2022-02-11 DIAGNOSIS — F33.1 RECURRENT MAJOR DEPRESSIVE EPISODES, MODERATE (HCC): Primary | ICD-10-CM

## 2022-02-11 PROCEDURE — 99213 OFFICE O/P EST LOW 20 MIN: CPT | Performed by: NURSE PRACTITIONER

## 2022-02-11 PROCEDURE — G0176 OPPS/PHP;ACTIVITY THERAPY: HCPCS

## 2022-02-11 PROCEDURE — G0177 OPPS/PHP; TRAIN & EDUC SERV: HCPCS

## 2022-02-11 PROCEDURE — G0410 GRP PSYCH PARTIAL HOSP 45-50: HCPCS

## 2022-02-11 NOTE — PSYCH
Subjective:     Patient ID: Kiera Hill is a 42 y o  female  [facility] Clinical Progress Notes      Specialized Services Documentation  Therapist must complete separate progress note for each specific clinical activity in which the individual participated during the day  GROUP PSYCHOTHERAPY ([id number]) The group engaged in the wellness assessment, which evaluates progress on several different areas of wellness/wellbeing: physical, emotional, cognitive, vocational, social and spiritual  Clients rated their progress and discussed areas that need work  By completing and discussing areas of progress and challenges, members are connected and reminded that, in their mental health struggle, they are not alone  Topics of discussion revolved around values of independence, asking for help when needed and physical changes that can effect mental health  Kiera Hill shared an area of progress working on prioritizing needs and accomplishing goals accordingly  Seemaguanaco Pantoja continues to demonstrate progress towards goals through participation in group activity and personal disclosures  Continue with psychotherapy     TX Plan Objectives: 1 1, 1 2, 1 4  Therapist: Gabby Calles MA, [facility]

## 2022-02-11 NOTE — PSYCH
Subjective:     Patient ID: Lorri Holden is a 42 y o  female  [facility] Clinical Progress Notes      Specialized Services Documentation  Therapist must complete separate progress note for each specific clinical activity in which the individual participated during the day  Other Utilization Review: 1600 Received call from [place]  [facility] phone [phone],  5 additional days authorized from 2/14/2022 to 2/18/2022  Authorization number [id number]  Sulma Born, [facility]     Case Management Note    Sulma Born [facility]    Current suicide risk : Low     INDIVIDUAL PSYCHOTHERAPY  This case management session was facilitated virtually in a private office using HIPAA Compliant and Approved Microsoft Teams  Lorri Holden consented to the use of tele-video modality of treatment  1145 - 1200 Met with Lorri Holden to discuss weekend plans and supports  Lorri Holden identified gaining new awareness related to her self-care, values, and work  Homework for the weekend is to work on living 1 value which was identified in discussion  Goals for next week include application of strategies into daily life, potential step down to IOP, and monitoring of medication needs and work on tx plan goals  Medications changes/added/denied? No    Treatment session number: 10    Individual Case Management Visit provided today? Yes     [facility] follow up physician's orders: see S Denver Mouse, NP note

## 2022-02-11 NOTE — PSYCH
Subjective:     Patient ID: Shantelle Jimenez is a 42 y o  female  [facility] Clinical Progress Notes      Specialized Services Documentation  Therapist must complete separate progress note for each specific clinical activity in which the individual participated during the day  Group Psychotherapy ([id number]) Leta Garcia engaged in an education group on the [place] skills from mindfulness theory of Dialectical Behavioral therapy (DBT)  The HOW skills cover three main domains of: Jazmin Spies, Stay Focused, and Do What Works  [facility] skills then cover three objectives of: Observe, Describe, and Participate  Each group member then participated in a small exercise where two group members look at each other and observe what each other is wearing  After, observing each member turned their backs and rearranged or moved around things they were wearing  Lastly, each group member turned back to each other and stated what they noticed regarding any changes the other group member might have made  This itself helped group members practice mindfulness while observing and describing each other in a non-judgmental way  Leta Garcia will continue with life skills and psychotherapy groups  Good progress made towards treatment  Tx Plan Objective: 1 1,1 2 Therapist:  CARISA Rios    Education Therapy   [id number] Shantelle Jimenez actively shared in morning assessment and goal review  Presented as Receptive related to readiness to learn  Shantelle Jimenez did complete goal from last treatment day identifying gaining hope  did not present with any barriers to learning  [id number] Shantelle Jimenez engaged throughout the treatment day  Was engaged in learning related to Illness, Medication, Aftercare and Wellness Tools  Staff utilized Verbal, Written, A/V and Demonstration teaching methods    Shantelle Jimenez shared area of learning and set a goal for outside of program to practice her mindfulness skills when dealing with a more difficult situation this weekend        Tx Plan Objective: 1 1,1 2 Therapist:  CARISA Valdez

## 2022-02-11 NOTE — PSYCH
Subjective:     Patient ID: Rolando Darling is a 42 y o  female  [facility] Clinical Progress Notes      Specialized Services Documentation  Therapist must complete separate progress note for each specific clinical activity in which the individual participated during the day  Allied Therapy   [id number]--  Rolando Darling participated in [facility] group focused on the use of music mindfulness and grounding strategies to regulate thoughts and emotions  Rolando Darling  engaged in  music listening, practicing techniques, as well as group discussion  Group explored practice of various mindfulness strategies with active music listening to explore how to ground themselves when experiencing intense emotions  Group utilized sensory mindfulness techniques (rainbow, 5 countdown technique, draw your breath, etc ), rhythmic grounding, and listening to music to identify emotions and the body sensations that occur  Lila Paola shared a technique she will continue to use and practice is the rainbow technique  Some effort noted toward treatment goal  Continue AT to encourage the development and practice of utilizing mindfulness techniques to alleviate symptoms and support wellness    Tx Plan Objective: 1 1,  1 2, & 1 4   Therapist:  DELMA Turner

## 2022-02-11 NOTE — PSYCH
PHP MEDICATION MANAGEMENT NOTE        [facility]    Name and Date of Birth:  Carroll Scott 42 y o  1975 MRN: [id number]    Date of Visit: February 11, 2022    Allergies   Allergen Reactions    Pollen Extract Allergic Rhinitis     SUBJECTIVE:    Valentino Correa is seen today for a follow up for Major Depressive Disorder, mood disorder and Generalized Anxiety Disorder  She reports that she has improved slightly since the last visit  Continue but on issues sleep difficulty  States that sleep has been somewhat better this week  Energy has improved slightly  Patient outlook is somewhat negative  Worries about returning to work is in connections from the group not having time to apply coping skills busy day-to-day life  Patient encouraged to find ways to incorporate coping skills and day-to-day life and prioritize self-care  She denies any side effects from medications  PLAN:  Continue citalopram 40 mg p o  Daily  Aware of 24 hour and weekend coverage for urgent situations accessed by calling [facility] main practice number  Continue partial hospitalization program    Diagnoses and all orders for this visit:    Recurrent major depressive episodes, moderate (HCC)    Generalized anxiety disorder    Mood disorder ([facility] [address] )        Current Outpatient Medications on File Prior to Visit   Medication Sig Dispense Refill    ALPRAZolam (XANAX) 0 5 mg tablet Take 1 tablet (0 5 mg total) by mouth 3 (three) times a day as needed (as neded) 90 tablet 0    citalopram (CeleXA) 40 mg tablet Take 1 tablet (40 mg total) by mouth daily 90 tablet 1    ibuprofen (MOTRIN) 200 mg tablet Take by mouth every 6 (six) hours as needed for mild pain      omeprazole (PriLOSEC OTC) 20 MG tablet Take 20 mg by mouth daily      VITAMIN D PO Take 1,000 Units by mouth daily       No current facility-administered medications on file prior to visit         Psychotherapy Provided:     Individual psychotherapy provided: Yes  Counseling was provided during the session today for 16 minutes  Supportive counseling provided  HPI ROS Appetite Changes and Sleep:     She reports adequate number of sleep hours (7-8 hours), adequate appetite, adequate energy level   Denies homicidal ideation, denies suicidal ideation    Review Of Systems:      General emotional problems, decreased functioning and sleep disturbances   Personality no change in personality   Constitutional negative   ENT negative   Cardiovascular negative   Respiratory negative   Gastrointestinal negative   Genitourinary negative   Musculoskeletal negative   Integumentary negative   Neurological negative   Endocrine negative   Other Symptoms none, all other systems are negative     Mental Status Evaluation:    Appearance Adequate hygiene and grooming   Behavior cooperative and Superficial   Mood depressed  Depression Scale -  of 10 (0 = No depression)  Anxiety Scale -  of 10 (0 = No anxiety)   Speech Normal rate and volume   Affect mood-congruent and constricted   Thought Processes Goal directed and coherent   Thought Content Does not verbalize delusional material   Associations Tightly connected   Perceptual Disturbances Denies hallucinations and does not appear to be responding to internal stimuli   Risk Potential Suicidal/Homicidal Ideation - No evidence of suicidal or homicidal ideation and patient does not verbalize suicidal or homicidal ideation  Risk of Violence - No evidence of risk for violence found on assessment  Risk of Self Mutilation - No evidence of risk for self mutilation found on assessment   Orientation oriented to person, place, time/date and situation   Memory recent and remote memory grossly intact   Consciousness alert and awake   Attention/Concentration attention span and concentration are age appropriate   Insight partial   Judgement partial   Muscle Strength and Gait normal muscle strength and normal muscle tone, normal gait/station and normal balance   Motor Activity no abnormal movements   Language no difficulty naming common objects, no difficulty repeating a phrase, no difficulty writing a sentence   Fund of Knowledge adequate knowledge of current events  adequate fund of knowledge regarding past history  adequate fund of knowledge regarding vocabulary      Past Psychiatric History Update:     Inpatient Psychiatric Admission Since Last Encounter:   no  Suicide Attempt Or Self Mutilation Since Last Encounter:   no  Incidence of Violent Behavior Since Last Encounter:   no    Traumatic History Update:     New Onset of Abuse Since Last Encounter:   no  Traumatic Events Since Last Encounter:   no    Past Medical History:    Past Medical History:   Diagnosis Date    Anxiety     Candidiasis of mouth     Last Assessed [id number]    Depression     Hypertension     Memory loss 4/7/2021    Obesity     Sciatica     Last Assessed [id number]    Skin rash     Last Assessed [id number]    Urticaria     Last Assessed [id number]     Past Medical History Pertinent Negatives:   Diagnosis Date Noted    Allergic 08/14/2018    Anemia 08/14/2018    Arthritis 08/14/2018    Asthma 08/14/2018    Cancer ([facility] ) 08/14/2018    Chronic kidney disease 08/14/2018    [facility] 08/14/2018    COPD (chronic obstructive pulmonary disease) ([facility] ) 08/14/2018    Coronary artery disease 08/14/2018    Dementia ([facility] [address] ) 08/14/2018    Diabetes mellitus ([facility] 08/14/2018    Disease of thyroid gland 08/14/2018    Diverticulitis of colon 08/14/2018    Eating disorder 08/14/2018    GERD (gastroesophageal reflux disease) 08/14/2018    Head injury 01/27/2022    Heart murmur 08/14/2018    HL (hearing loss) 08/14/2018    Infectious viral hepatitis 08/14/2018    Inflammatory bowel disease 08/14/2018    Kidney stone 08/14/2018    Myocardial infarction ([facility] ) 08/14/2018    Osteoporosis 08/14/2018    Otitis media 08/14/2018    Pneumonia 08/14/2018    Scoliosis 08/14/2018    Seizures ([facility] ) 08/14/2018    Shingles 08/14/2018    Stroke ([place] [address] ) 08/14/2018    Substance abuse ([place] [address] ) 08/14/2018    Urinary tract infection 08/14/2018    Visual impairment 08/14/2018     Past Surgical History:   Procedure Laterality Date    DENTAL SURGERY       Allergies   Allergen Reactions    Pollen Extract Allergic Rhinitis     Substance Abuse History:    Social History     Substance and Sexual Activity   Alcohol Use Yes    Alcohol/week: 9 0 - 12 0 standard drinks    Types: 9 - 12 Glasses of wine per week    Comment: Social     Social History     Substance and Sexual Activity   Drug Use No     Social History:    Social History     Socioeconomic History    Marital status: /Civil Union     Spouse name: Not on file    Number of children: Not on file    Years of education: Not on file    Highest education level: Bachelor's degree (e g , BA, AB, BS)   Occupational History    Occupation: Visiting RN   Tobacco Use    Smoking status: Never Smoker    Smokeless tobacco: Never Used   Vaping Use    Vaping Use: Never used   Substance and Sexual Activity    Alcohol use:  Yes     Alcohol/week: 9 0 - 12 0 standard drinks     Types: 9 - 12 Glasses of wine per week     Comment: Social    Drug use: No    Sexual activity: Yes   Other Topics Concern    Not on file   Social History Narrative        Employed - [employer] Full Time    Lives with spouse     Caffeine use    Uses safety equipment: seat belts     Social Determinants of Health     Financial Resource Strain: Not on file   Food Insecurity: Not on file   Transportation Needs: Not on file   Physical Activity: Not on file   Stress: Not on file   Social Connections: Not on file   Intimate Partner Violence: Not on file   Housing Stability: Not on file     Family Psychiatric History:     Family History   Problem Relation Age of Onset    Depression Mother     Osteoporosis Mother     Testicular cancer Father     Cancer Father     Hypertension Father     Cancer Family     Diabetes Family     Heart disease Family     Stroke Family         Stroke syndrome    Alcohol abuse Paternal Grandfather     Mental illness Paternal Grandmother     Drug abuse Neg Hx     Completed Suicide  Neg Hx      History Review: The following portions of the patient's history were reviewed and updated as appropriate: allergies, current medications, past family history, past medical history, past social history, past surgical history and problem list     OBJECTIVE:     Vital signs in last 24 hours: There were no vitals filed for this visit  Laboratory Results: I have personally reviewed all pertinent laboratory/tests results  Medications Risks/Benefits:      Risks, Benefits And Possible Side Effects Of Medications:    Discussed risks and benefits of treatment with patient including risk of suicidality, serotonin syndrome, increased QTc interval and SIADH related to treatment with antidepressants; Risk of induction of manic symptoms in certain patient populations and risks of misuse, abuse or dependence, sedation and respiratory depression related to treatment with benzodiazepine medications     Controlled Medication Discussion:     Bijan Dougherty has been filling controlled prescriptions on time as prescribed according to [address] Tamia Dykes 02/11/22    This note was shared with patient

## 2022-02-14 ENCOUNTER — DOCUMENTATION (OUTPATIENT)
Dept: PSYCHOLOGY | Facility: CLINIC | Age: 47
End: 2022-02-14

## 2022-02-14 ENCOUNTER — APPOINTMENT (OUTPATIENT)
Dept: PSYCHOLOGY | Facility: CLINIC | Age: 47
End: 2022-02-14
Payer: COMMERCIAL

## 2022-02-14 NOTE — PROGRESS NOTES
Subjective:     Patient ID: Nasima Ying is a 42 y o  female  [facility] Clinical Progress Notes      Specialized Services Documentation  Therapist must complete separate progress note for each specific clinical activity in which the individual participated during the day  Case Management Note    Ramon Santos [id number]    Current suicide risk : Low     INDIVIDUAL PSYCHOTHERAPY    [id number] Spoke with Nasima Ying via telephone as she called out due to not sleeping last night  She reported a "rough weekend" culminating in her spending yesterday in a hotel room  She chose to go home today  She remains ambivalent related to her relationships and this writer has offered her several tools to work on this  Discussed goals for the week ahead  Medications changes/added/denied? No    Treatment session number: na    Individual Case Management Visit provided today?  No    [facility] follow up physician's orders: na

## 2022-02-15 ENCOUNTER — OFFICE VISIT (OUTPATIENT)
Dept: PSYCHOLOGY | Facility: CLINIC | Age: 47
End: 2022-02-15
Payer: COMMERCIAL

## 2022-02-15 ENCOUNTER — OFFICE VISIT (OUTPATIENT)
Dept: PSYCHIATRY | Facility: CLINIC | Age: 47
End: 2022-02-15
Payer: COMMERCIAL

## 2022-02-15 DIAGNOSIS — F33.1 RECURRENT MAJOR DEPRESSIVE EPISODES, MODERATE (HCC): ICD-10-CM

## 2022-02-15 DIAGNOSIS — F41.1 GENERALIZED ANXIETY DISORDER: ICD-10-CM

## 2022-02-15 DIAGNOSIS — F41.1 GENERALIZED ANXIETY DISORDER: Primary | ICD-10-CM

## 2022-02-15 DIAGNOSIS — F33.1 RECURRENT MAJOR DEPRESSIVE EPISODES, MODERATE (HCC): Primary | ICD-10-CM

## 2022-02-15 PROCEDURE — G0410 GRP PSYCH PARTIAL HOSP 45-50: HCPCS

## 2022-02-15 PROCEDURE — G0177 OPPS/PHP; TRAIN & EDUC SERV: HCPCS

## 2022-02-15 PROCEDURE — 99213 OFFICE O/P EST LOW 20 MIN: CPT | Performed by: PSYCHIATRY & NEUROLOGY

## 2022-02-15 PROCEDURE — G0176 OPPS/PHP;ACTIVITY THERAPY: HCPCS

## 2022-02-15 RX ORDER — CITALOPRAM 40 MG/1
40 TABLET ORAL DAILY
Qty: 90 TABLET | Refills: 1 | Status: SHIPPED | OUTPATIENT
Start: 2022-02-15

## 2022-02-15 NOTE — PSYCH
Subjective:     Patient ID: Charanjit Landeros is a 42 y o  female  [facility] Clinical Progress Notes      Specialized Services Documentation  Therapist must complete separate progress note for each specific clinical activity in which the individual participated during the day  Group Psychotherapy  ([id number]) Freddrick Aschoff engaged in an open-discussion process group  The group opened up with the prompt question of Where would you rate yourself regarding your wellness journey  Rating yourself anywhere from a 0-10  Then the group talked about what has been working and what hasnt been working in regards to applying skills learned from program   Freddrick Aschoff will continue with life skills and psychotherapy groups  Some progress made towards treatment   Tx Plan Objective: 1 1,1 2 Therapist:  CARISA Colindres

## 2022-02-15 NOTE — PSYCH
Subjective:     Patient ID: Wilman Ventura is a 42 y o  female  [facility] Clinical Progress Notes      Specialized Services Documentation  Therapist must complete separate progress note for each specific clinical activity in which the individual participated during the day  Case Management Note    Oz NGUYỄN    Current suicide risk : Low     Not a case management day for Wilman Ventura today  Did not reach out with any additional questions and concerns and aware of next scheduled 1:1       Medications changes/added/denied? No    Treatment session number: 11    Individual Case Management Visit provided today?  Yes     [facility] follow up physician's orders: na

## 2022-02-15 NOTE — PSYCH
Subjective:     Patient ID: Kiera Hill is a 42 y o  female  [facility] Clinical Progress Notes      Specialized Services Documentation  Therapist must complete separate progress note for each specific clinical activity in which the individual participated during the day  Education Therapy   [phone] Kiera Hill quietly shared in morning assessment and goal review  Presented as Receptive related to readiness to learn  Kiera Hill  Was not here yesterday  did not present with any barriers to learning  [id number] Kiera Hill engaged throughout the treatment day  Was engaged in learning related to Illness, Aftercare and Wellness Tools  Staff utilized Verbal and A/V teaching methods  Kiera Hill shared area of learning and set a goal for outside of program to work on school project        Tx Plan Objective: 1 4, Therapist:  Eleazar Goetz MA

## 2022-02-15 NOTE — PSYCH
This note was not shared with the patient due to patient requested      Treatment Recommendations- Risks Benefits     Immediate Medical/Psychiatric/Psychotherapy Treatments and Any Precautions:     Continue partial program  Continue Celexa 40 mg p o  Daily, medication was refilled today  Continue Xanax 0 5 mg p o  t i d  P r n  For severe anxiety    Risks, Benefits And Possible Side Effects Of Medications:  Risks, benefits, and possible side effects of medications explained to patient and patient verbalizes understanding    Controlled Medication Discussion: Discussed with patient the risks of sedation, respiratory depression, impairment of ability to drive and potential for abuse and addiction related to treatment with benzodiazepine medications  The patient understands risk of treatment with benzodiazepine medications, agrees to not drive if feels impaired and agrees to take medications as prescribed  and The patient has been filling controlled prescriptions on time as prescribed to [facility] program           Assessment/Plan:       Diagnoses and all orders for this visit:    Generalized anxiety disorder    Recurrent major depressive episodes, moderate (HCC)  -     citalopram (CeleXA) 40 mg tablet; Take 1 tablet (40 mg total) by mouth daily          Subjective:     Patient ID: Giorgio Hollingsworth is a 42 y o  female  With depression and anxiety who has been participating in the partial program was seen for medication management  She states that she feels depressed because she continued to feels tormen  about states at home or leave the house  On Sunday she stayed in hotel but yesterday she came back home and she had a conversation with the , she told him that she had not decided yet if she want to stay or leave he because of sometimes she want to stay and other times she want to leave    She also states had been very difficult for her to find a place that she can take her animals  She states that she have a sleep difficulties, her appetite goes up and down, sometimes she has feeling suicidal thoughts but no plan or intent, she denies any psychotic symptoms or manic episode  She had been taking his Celexa daily but the Xanax only very rarely  She states that the program had been helpful because she  has no others support system  HPI ROS Appetite Changes and Sleep: increased appetite, decreased energy, no weight change and normal number of sleep hours    Review Of Systems:     Mood Anxiety and Depression   Behavior Normal    Thought Content Normal   General Relationship Problems   Personality Normal   Other Psych Symptoms Normal   Constitutional Negative   ENT Negative   Cardiovascular Negative   Respiratory Negative   Gastrointestinal Negative   Genitourinary Negative   Musculoskeletal Negative   Integumentary Negative   Neurological Negative   Endocrine Normal    Other Symptoms Normal              Laboratory Results: No results found for this or any previous visit  Substance Abuse History:  Social History     Substance and Sexual Activity   Drug Use No       Family Psychiatric History:   Family History   Problem Relation Age of Onset    Depression Mother     Osteoporosis Mother     Testicular cancer Father     Cancer Father     Hypertension Father     Cancer Family     Diabetes Family     Heart disease Family     Stroke Family         Stroke syndrome    Alcohol abuse Paternal Grandfather     Mental illness Paternal Grandmother     Drug abuse Neg Hx     Completed Suicide  Neg Hx        The following portions of the patient's history were reviewed and updated as appropriate:   She  has a past medical history of Anxiety, Candidiasis of mouth, Depression, Hypertension, Memory loss (4/7/2021), Obesity, Sciatica, Skin rash, and Urticaria    She   Patient Active Problem List    Diagnosis Date Noted    [facility] 01/25/[facility]    Upper respiratory tract infection 11/01/2021    Generalized anxiety disorder 09/20/2021    Acute non-recurrent frontal sinusitis 06/25/2021    Memory loss 04/07/2021    Neck pain 04/07/2021    Balance disorder 04/07/2021    Weakness 04/07/2021    History of COVID-19 12/18/2020    Right sided sciatica 07/09/2018    Low back pain 12/21/2017    Recurrent major depressive episodes, moderate ([facility] ) 04/15/2013    Hyperlipidemia 11/13/2012    Hypertension 11/13/2012     She  has a past surgical history that includes Dental surgery  Her family history includes Alcohol abuse in her paternal grandfather; Cancer in her family and father; Depression in her mother; Diabetes in her family; Heart disease in her family; Hypertension in her father; Mental illness in her paternal grandmother; Osteoporosis in her mother; Stroke in her family; Testicular cancer in her father  She  reports that she has never smoked  She has never used smokeless tobacco  She reports current alcohol use of about 9 0 - 12 0 standard drinks of alcohol per week  She reports that she does not use drugs  Current Outpatient Medications   Medication Sig Dispense Refill    ALPRAZolam (XANAX) 0 5 mg tablet Take 1 tablet (0 5 mg total) by mouth 3 (three) times a day as needed (as neded) 90 tablet 0    citalopram (CeleXA) 40 mg tablet Take 1 tablet (40 mg total) by mouth daily 90 tablet 1    ibuprofen (MOTRIN) 200 mg tablet Take by mouth every 6 (six) hours as needed for mild pain      omeprazole (PriLOSEC OTC) 20 MG tablet Take 20 mg by mouth daily      VITAMIN D PO Take 1,000 Units by mouth daily       No current facility-administered medications for this visit  She is allergic to pollen extract         Objective:     Physical Exam        Mental status:  Appearance calm and cooperative , adequate hygiene and grooming and good eye contact    Mood depressed and anxious   Affect affect was tearful and affect appropriate    Speech a normal rate   Thought Processes coherent/organized and normal thought processes   Hallucinations no hallucinations present    Thought Content no delusions   Abnormal Thoughts passive/fleeting thoughts of suicide and no homicidal thoughts    Orientation  oriented to person and place and time   Recent & Remote Memory short term memory intact and long term memory intact   Attention & Concentration Span concentration intact   Intellect Appears to be of Average Intelligence   Fund of Knowledge displays adequate knowledge of current events, adequate fund of knowledge regarding past history and adequate fund of knowledge regarding vocabulary    Insight Insight intact   Judgement judgment was intact   Muscle Strength Muscle strength and tone were normal and Normal gait    Language no difficulty naming common objects, no difficulty repeating a phrase  and no difficulty writing a sentence    Fund of Knowledge displays adequate knowledge of current events, adequate fund of knowledge regarding past history and adequate fund of knowledge regarding vocabulary    Pain none   Pain Scale 0       Tavia Klein MD

## 2022-02-15 NOTE — PSYCH
Subjective:     Patient ID: Marcia Johnson is a 42 y o  female  [facility] Clinical Progress Notes      Specialized Services Documentation  Therapist must complete separate progress note for each specific clinical activity in which the individual participated during the day  Psychotherapeutic Groups ([id number])    This group was about perception and how our biases, past experiences, past trauma, and assumptions influence the way we interpret stimulus  They learned that most of perception occurs unconsciously  The group also gained an understanding of how perception/observation can be used as a mindfulness technique  Marcia Johnson participated by engaging in the powerpoint exercise, promoting discussion, and sharing insights      Tx Plan Objective: 1 1,1 2, 1 4   Therapist: Zachariah Puga MS

## 2022-02-15 NOTE — PSYCH
Subjective:     Patient ID: Vinnie Salmeron is a 42 y o  female  [facility] Clinical Progress Notes      Specialized Services Documentation  Therapist must complete separate progress note for each specific clinical activity in which the individual participated during the day  Allied Therapy   [id number]--Brandie Gloria actively shared in [facility] group focused on social connection, rewiring unhealthy thoughts related to loneliness, and setting daily intentions related to how we are utilizing our coping skills  Vinnie Salmeron engaged in group by sharing personal reflections, music listening, related to jaydon analysis and journaling during reflection time  Group explored practice of jaydon analysis, utilizing self-reflection questions to guide thought processes related to loneliness, and shared with the group during jaydon analysis  Vinnie Salmeron shared in group related to song lyrics that the song made her upset as she wished her mother would have treated her how the song lyrics stated  Tim Dre acknowledged that her mother may also suffer from her own mental health illness and appeared to be processing the damage that occurred from being raised by her  Some effort noted toward treatment goal   Continue AT to encourage the development and practice of monitering how we are utilizing our alone time and social connection time to  alleviate symptoms and support wellness       Treatment Plan Objectives Addressed: 1 1, 1 2, 1 4    Therapist:  DELMA Alatorre

## 2022-02-16 ENCOUNTER — OFFICE VISIT (OUTPATIENT)
Dept: PSYCHOLOGY | Facility: CLINIC | Age: 47
End: 2022-02-16
Payer: COMMERCIAL

## 2022-02-16 DIAGNOSIS — F41.1 GENERALIZED ANXIETY DISORDER: ICD-10-CM

## 2022-02-16 DIAGNOSIS — F33.1 RECURRENT MAJOR DEPRESSIVE EPISODES, MODERATE (HCC): Primary | ICD-10-CM

## 2022-02-16 PROCEDURE — G0176 OPPS/PHP;ACTIVITY THERAPY: HCPCS

## 2022-02-16 PROCEDURE — G0177 OPPS/PHP; TRAIN & EDUC SERV: HCPCS

## 2022-02-16 PROCEDURE — G0410 GRP PSYCH PARTIAL HOSP 45-50: HCPCS

## 2022-02-16 NOTE — PSYCH
Subjective:     Patient ID: Giorgio Hollingsworth is a 42 y o  female  [facility] Clinical Progress Notes      Specialized Services Documentation  Therapist must complete separate progress note for each specific clinical activity in which the individual participated during the day  GROUP PSYCHOTHERAPY ([id number]) Members were educated on [employer] 3 levels of forgiveness and the 12 steps of forgiveness  Members discussed their understanding of forgiveness and what it would mean to forgive someone or themselves  Members shared personal disclosures of times that it was challenging to forgive and barriers to the forgiving process  Members were encouraged to utilize gratitude and remember that forgiveness is an ongoing process that unfolds over time  Giorgio Hollingsworth continues to demonstrate insight and progress through verbal disclosures in group  Continue with psychotherapy  TX Plan Objectives: 1 1, 1 2, 1 4   Therapist: Kenroy Duron MA, [facility]       Education Therapy   [id number] Giorgio Hollingsworth actively shared in morning assessment and goal review  Presented as Receptive related to readiness to learn  Giorgio Hollingsworth did complete goal from last treatment day identifying gaining responsibility and education  did not present with any barriers to learning  [id number] Giorgio Hollingsworth engaged throughout the treatment day  Was engaged in learning related to Illness, Medication, Aftercare and Wellness Tools  Staff utilized Verbal, Written, A/V and Demonstration teaching methods  Giorgio Hollingsworth shared area of learning and set a goal for outside of program to work on school project and look over Tereza Pierson        Tx Plan Objective: 1 1, 1 2, 1 4 Therapist:  Kenroy Duron MA, [facility]

## 2022-02-16 NOTE — PSYCH
Subjective:     Patient ID: Keesha Coleman is a 42 y o  female  [facility] Clinical Progress Notes      Specialized Services Documentation  Therapist must complete separate progress note for each specific clinical activity in which the individual participated during the day  Case Management Note    Joce Z[facility]    Current suicide risk : Low     INDIVIDUAL PSYCHOTHERAPY    7427-5124 Met with Keesha Coleman  Reported she continues to feel overwhelmed and her mood feels "all over the place"  She does not know what she should do related to work and program   "One minute I think I should just go back to work and need the money, and the next I think I need to do IOP"  Encouraged her to work with pros/cons and reviewed options  She continues to struggle with overriding issue of her marriage and was given DBT handouts on ending relationships and steps/strategies to explore decisions in "wise mind"  Medications changes/added/denied? No    Treatment session number: 12    Individual Case Management Visit provided today?  Yes     [facility] follow up physician's orders: na

## 2022-02-16 NOTE — PSYCH
Subjective:     Patient ID: Marcia Hwang is a 42 y o  female  [facility] Clinical Progress Notes      Specialized Services Documentation  Therapist must complete separate progress note for each specific clinical activity in which the individual participated during the day  Group Psychotherapy ([id number]) Yamila Borden engaged in the Interpersonal Effectiveness group topic around the acronym FAST  FAST stands for F-Be Fair; A-No Apologies; S-Stick to values; and T-Be Truthful  The group started with a power-point presentation before moving into an open-discussion format  During the discussion group members related their own experiences and barriers to when it can be more difficult to apply the FAST skill  Good effort noted toward treatment goals  Continue psychotherapy to explore wellness strategies and encourage personal practice   Tx Plan Objective: 1 1,1 2 Therapist:  CARISA Mercer

## 2022-02-16 NOTE — PSYCH
Subjective:     Patient ID: Camille Wylie is a 42 y o  female  [facility] Clinical Progress Notes      Specialized Services Documentation  Therapist must complete separate progress note for each specific clinical activity in which the individual participated during the day  Allied Therapy   [id number]- Camille Wylie actively shared in [facility] group focused on identifying the 5 phases of acceptance  Camille Wylie engaged in group by actively listening to music, jaydon analysis, completed an education component to learning about each phase and characteristics seen each one as well as how each phase serves us, participating in group discussion, and writing a letter to a situation relevant to their life where they are having difficulty accepting an outcome  Group explored practice of writing a letter to begin taking the first step towards practicing letting go  Each group member also identified their road blocks and obstacles to acceptance  Some effort noted toward treatment goal   Continue AT to encourage the development and practice of writing or having a conversation with one's self to promote letting go,  alleviate symptoms, and support wellness    Tx Plan Objective: 1 1, 1 2, & 1 4      Therapist:  DELMA Pichardo

## 2022-02-17 ENCOUNTER — OFFICE VISIT (OUTPATIENT)
Dept: PSYCHOLOGY | Facility: CLINIC | Age: 47
End: 2022-02-17
Payer: COMMERCIAL

## 2022-02-17 DIAGNOSIS — F41.1 GENERALIZED ANXIETY DISORDER: ICD-10-CM

## 2022-02-17 DIAGNOSIS — F33.1 RECURRENT MAJOR DEPRESSIVE EPISODES, MODERATE (HCC): Primary | ICD-10-CM

## 2022-02-17 PROCEDURE — G0410 GRP PSYCH PARTIAL HOSP 45-50: HCPCS

## 2022-02-17 PROCEDURE — G0177 OPPS/PHP; TRAIN & EDUC SERV: HCPCS

## 2022-02-17 PROCEDURE — G0176 OPPS/PHP;ACTIVITY THERAPY: HCPCS

## 2022-02-17 NOTE — PSYCH
Subjective:     Patient ID: Shantelle Jimenez is a 42 y o  female  [facility] Clinical Progress Notes      Specialized Services Documentation  Therapist must complete separate progress note for each specific clinical activity in which the individual participated during the day  Allied Therapy    ([id number])  CPS Hector Peña share his life story as he co-led this session  Group encouraged power of learning about self, accepting illness and personal responsibility in recovery  Community resources reviewed in addition to personal resources like the affirmations  Shantelle Jimenez  attentively listened to [facility] presentation  Progress toward goals noted  Continue psychotherapy to encourage self -awareness and healthy engagement of supports      TX Plan Objectives: 1 1, 1 2, 1 4   Therapist: DELMA Becker

## 2022-02-17 NOTE — PSYCH
Subjective:     Patient ID: Shantelle Jimenez is a 42 y o  female  [facility] Clinical Progress Notes      Specialized Services Documentation  Therapist must complete separate progress note for each specific clinical activity in which the individual participated during the day  GROUP PSYCHOTHERAPY (5639-2789)  The group engaged in psychoeducation related to [address] of Needs and the [address] experiments  They gained an understanding that basic needs must be met before change of self can occur, that self-medicating and negative responses to stimuli are learned behaviors and can be replaced with positive/neutral behaviors, and identified ways they can improve on their hierarchy  They first watched a 5 minute video on the [employer] experiments and discussed after  The group participated in an activity and discussions that identified their [address] of Needs (both satisfied and lacking)  They were provided with a [address] worksheet that they can take home and continue to work on  Shantelle Jimenez actively participated by sharing multiple times, promoting discussion, and asking questions  Showed insight  Continue with Psychotherapy      TX Plan Objectives: 1 1, 1 2, 1 4  Therapist: Carolyne Greenfield MS

## 2022-02-17 NOTE — PSYCH
Subjective:     Patient ID: Shantelle Jimenez is a 42 y o  female  [facility] Clinical Progress Notes      Specialized Services Documentation  Therapist must complete separate progress note for each specific clinical activity in which the individual participated during the day  Group Psychotherapy  (4537-1016) Leta Garcia engaged in an open-discussion process group  The group opened up with the prompt question of Where would you rate yourself regarding your wellness journey  Rating yourself anywhere from a 0-10  Then the group talked about what has been working and what hasnt been working in regards to applying skills learned from program   Leta Garcia will continue with life skills and psychotherapy groups  Good progress made towards treatment  Tx Plan Objective: 1 1,1 2 Therapist:  CARISA Rios    Education Therapy   [id number] Shantelle Ganns actively shared in morning assessment and goal review  Presented as Receptive related to readiness to learn  Shantelle Jimenez did complete goal from last treatment day identifying gaining responsibility  did not present with any barriers to learning  [id number] Shantelle Ganns engaged throughout the treatment day  Was engaged in learning related to Illness, Medication, Aftercare and Wellness Tools  Staff utilized Verbal, Written, A/V and Demonstration teaching methods  Shantelle Ganns shared area of learning and set a goal for outside of program to work on school stuff        Tx Plan Objective: 1 1,1 2 Therapist:  CARISA Rios

## 2022-02-17 NOTE — PSYCH
Subjective:     Patient ID: Kiera Hill is a 42 y o  female  [facility] Clinical Progress Notes      Specialized Services Documentation  Therapist must complete separate progress note for each specific clinical activity in which the individual participated during the day  Case Management Note    Chauncey Boyer [facility]    Current suicide risk : Low     INDIVIDUAL PSYCHOTHERAPY    2911-3010 Met with Kiera Hill  Reported that she would like to return to work on  Monday and feels ready for discharge tomorrow  She has OP on Monday  Letter sent to her HR and manager  Discussed relationship assignment and personal self care  Given relapse prevention plan to complete for tomorrow  Medications changes/added/denied? No    Treatment session number: 13    Individual Case Management Visit provided today?  Yes     [facility] follow up physician's orders: na

## 2022-02-17 NOTE — PSYCH
[facility] Discharge Instructions:   Disposition: home  Address: [place] [address] [address]   Diagnosis:  Encounter Diagnoses   Name Primary?  Recurrent major depressive episodes, moderate (HCC) Yes    Generalized anxiety disorder       Allergies (Drug/Food): Allergies   Allergen Reactions    Pollen Extract Allergic Rhinitis     Activity: you may not return to work until 2/21/22  Diet:no recommendations  Smoking Cessation:not a smoker   Diagnostic/Laboratory Orders: none ordered  Vaccines: If you received a vaccine, please notify your family physician on your next visit  For more information, please call [phone]  Follow-up appointments/Referrals:   Medication Management  Malathi Israel  [phone]  Please call to schedule    Outpatient Therapy  Virgilio Martinez  [phone]  Monday 2/21/2022 6pm    Keep Follow Up Appointments, Take Medication As Prescribed, Utilize WRAP, Consider Support Groups and/or Volunteer Opportunities    [facility] [phone]  Crisis Intervention (Emergency) [place] Service:  C/M/P: [phone]  National Crisis Intervention Hotline: 7-736.670.8572  National Suicide Crisis Hotline: 9-636.826.5124  I, the undersigned, have received and understand the above instructions        Patient/Rep Signature: __________________________________     Date/Time: ______________       Physician Signature: ____________________________________    Date/Time: ______________      Signature: ________________________________     Date/Time: ______________

## 2022-02-18 ENCOUNTER — OFFICE VISIT (OUTPATIENT)
Dept: PSYCHOLOGY | Facility: CLINIC | Age: 47
End: 2022-02-18
Payer: COMMERCIAL

## 2022-02-18 ENCOUNTER — OFFICE VISIT (OUTPATIENT)
Dept: PSYCHIATRY | Facility: CLINIC | Age: 47
End: 2022-02-18
Payer: COMMERCIAL

## 2022-02-18 DIAGNOSIS — F41.1 GENERALIZED ANXIETY DISORDER: ICD-10-CM

## 2022-02-18 DIAGNOSIS — F39 MOOD DISORDER (HCC): ICD-10-CM

## 2022-02-18 DIAGNOSIS — F33.1 RECURRENT MAJOR DEPRESSIVE EPISODES, MODERATE (HCC): Primary | ICD-10-CM

## 2022-02-18 PROCEDURE — G0176 OPPS/PHP;ACTIVITY THERAPY: HCPCS

## 2022-02-18 PROCEDURE — G0177 OPPS/PHP; TRAIN & EDUC SERV: HCPCS

## 2022-02-18 PROCEDURE — 99214 OFFICE O/P EST MOD 30 MIN: CPT | Performed by: NURSE PRACTITIONER

## 2022-02-18 PROCEDURE — G0410 GRP PSYCH PARTIAL HOSP 45-50: HCPCS

## 2022-02-18 NOTE — PSYCH
Subjective:     Patient ID: Mervin Euceda is a 42 y o  female  [facility] Clinical Progress Notes      Specialized Services Documentation  Therapist must complete separate progress note for each specific clinical activity in which the individual participated during the day  Group Psychotherapy (8535-1065) [facility] psychotherapy group focused on Radical Acceptance   Followed by a worksheet exploring ways to respond when a serious problem comes into your life   Group discussed impact on treatment and ways to increase acceptance in different areas of our lives  Turning the mind, willingness, and half-smiling /willing hands were also handouts given that went along with the distress tolerance topic  Progressive muscle relaxation exercise aided in closing the group   Good effort noted toward treatment goals   Continue psychotherapy to explore wellness strategies and encourage personal practice  Tx Plan Objective: 1 1,1 2 Therapist:  CARISA Sharma    Education Therapy   0529-9074 Mervin Euceda actively shared in morning assessment and goal review  Presented as Receptive related to readiness to learn  Mervin Euceda did complete goal from last treatment day identifying gaining responsibility  did not present with any barriers to learning  [id number] Mervin Euceda engaged throughout the treatment day  Was engaged in learning related to Illness, Medication, Aftercare and Wellness Tools  Staff utilized Verbal, Written, A/V and Demonstration teaching methods  Mervin Euceda shared area of learning and set a goal for outside of program to read one of her Buddism books that she bought        Tx Plan Objective: 1 1,1 2 Therapist:  CARISA Sharma

## 2022-02-18 NOTE — PSYCH
Subjective:     Patient ID: Crystal Scruggs is a 42 y o  female  [facility] Clinical Progress Notes      Specialized Services Documentation  Therapist must complete separate progress note for each specific clinical activity in which the individual participated during the day  GROUP PSYCHOTHERAPY ([id number]) The group engaged in the weekly wellness assessment, which evaluates progress on several different areas of wellness/wellbeing: physical, emotional, cognitive, vocational, social and spiritual  Members engaged in a therapeutic activity that addressed 3 questions within specific categories, that were picked at random  The questions were as followed in the identified categories:  1  In this area, where have you made progress? 2  In this area, where do you wish you made more progress? 3  What is a goal for this area that you can work on over the next week? Each member completed the activity by speaking one positive about themself  Brandie's categories were cognitive and physical  Goal addressed was work on prioritizing needs  Cristóbal Reddy continues to make progress towards goals through participation in group activity and personal disclosures  Continue with psychotherapy     TX Plan Objectives: 1 1, 1 2, 1 4  Therapist: Bhumika Lockhart MA, LPC

## 2022-02-18 NOTE — PSYCH
PHP MEDICATION MANAGEMENT NOTE        [address]    Name and Date of Birth:  Jessica Hua 42 y o  1975 MRN: [id number]    Date of Visit: February 18, 2022    Allergies   Allergen Reactions    Pollen Extract Allergic Rhinitis     SUBJECTIVE:    Noris Carias is seen today for a follow up for Major Depressive Disorder and Generalized Anxiety Disorder  She reports that she has improved since beginning PHP  Patient has planned discharge today  States that overall she is doing well  He continues to struggle with sleep and daytime energy  States that she has a plan improving sleep and energy - diet and exercise changes, maintaining routine  Has been adequate medication supply  Plans to follow-up with PCP for medication management and will continue with individual therapy  She denies any side effects from medications  PLAN:  Continue Celexa 40 mg p o  Daily  Aware of 24 hour and weekend coverage for urgent situations accessed by calling [facility] main practice number  Continue partial hospitalization program    Diagnoses and all orders for this visit:    Recurrent major depressive episodes, moderate (HCC)    Generalized anxiety disorder    Mood disorder ([address] )        Current Outpatient Medications on File Prior to Visit   Medication Sig Dispense Refill    ALPRAZolam (XANAX) 0 5 mg tablet Take 1 tablet (0 5 mg total) by mouth 3 (three) times a day as needed (as neded) 90 tablet 0    citalopram (CeleXA) 40 mg tablet Take 1 tablet (40 mg total) by mouth daily 90 tablet 1    ibuprofen (MOTRIN) 200 mg tablet Take by mouth every 6 (six) hours as needed for mild pain      omeprazole (PriLOSEC OTC) 20 MG tablet Take 20 mg by mouth daily      VITAMIN D PO Take 1,000 Units by mouth daily       No current facility-administered medications on file prior to visit         Psychotherapy Provided:     Individual psychotherapy provided: Yes  Counseling was provided during the session today for 16 minutes  Supportive counseling provided  HPI ROS Appetite Changes and Sleep:     She reports difficulty falling asleep, adequate appetite, fluctuating energy levels   Denies homicidal ideation, denies suicidal ideation    Review Of Systems:      General decreased functioning   Personality no change in personality   Constitutional negative   ENT negative   Cardiovascular negative   Respiratory negative   Gastrointestinal negative   Genitourinary negative   Musculoskeletal negative   Integumentary negative   Neurological negative   Endocrine negative   Other Symptoms none, all other systems are negative     Mental Status Evaluation:    Appearance Adequate hygiene and grooming   Behavior calm and cooperative   Mood euthymic  Depression Scale -  of 10 (0 = No depression)  Anxiety Scale -  of 10 (0 = No anxiety)   Speech Normal rate and volume   Affect appropriate and mood-congruent   Thought Processes Goal directed and coherent   Thought Content Does not verbalize delusional material   Associations Tightly connected   Perceptual Disturbances Denies hallucinations and does not appear to be responding to internal stimuli   Risk Potential Suicidal/Homicidal Ideation - No evidence of suicidal or homicidal ideation and patient does not verbalize suicidal or homicidal ideation  Risk of Violence - No evidence of risk for violence found on assessment  Risk of Self Mutilation - No evidence of risk for self mutilation found on assessment   Orientation oriented to person, place and time/date   Memory recent and remote memory grossly intact   Consciousness alert and awake   Attention/Concentration attention span and concentration are age appropriate   Insight intact   Judgement intact   Muscle Strength and Gait normal muscle strength and normal muscle tone, normal gait/station and normal balance   Motor Activity no abnormal movements   Language no difficulty naming common objects, no difficulty repeating a phrase, no difficulty writing a sentence   Fund of Knowledge adequate knowledge of current events  adequate fund of knowledge regarding past history  adequate fund of knowledge regarding vocabulary      Past Psychiatric History Update:     Inpatient Psychiatric Admission Since Last Encounter:   no  Suicide Attempt Or Self Mutilation Since Last Encounter:   no  Incidence of Violent Behavior Since Last Encounter:   no    Traumatic History Update:     New Onset of Abuse Since Last Encounter:   no  Traumatic Events Since Last Encounter:   no    Past Medical History:    Past Medical History:   Diagnosis Date    Anxiety     Candidiasis of mouth     Last Assessed [id number]    Depression     Hypertension     Memory loss 4/7/2021    Obesity     Sciatica     Last Assessed [id number]    Skin rash     Last Assessed [id number]    Urticaria     Last Assessed [id number]     Past Medical History Pertinent Negatives:   Diagnosis Date Noted    Allergic 08/14/2018    Anemia 08/14/2018    Arthritis 08/14/2018    Asthma 08/14/2018    Cancer ([facility] ) 08/14/2018    Chronic kidney disease 08/14/2018    Clotting disorder ([facility] ) 08/14/2018    COPD (chronic obstructive pulmonary disease) ([facility] ) 08/14/2018    Coronary artery disease 08/14/2018    Dementia ([facility] ) 08/14/2018    Diabetes mellitus ([facility] ) 08/14/2018    Disease of thyroid gland 08/14/2018    Diverticulitis of colon 08/14/2018    Eating disorder 08/14/2018    GERD (gastroesophageal reflux disease) 08/14/2018    Head injury 01/27/2022    Heart murmur 08/14/2018    HL (hearing loss) 08/14/2018    Infectious viral hepatitis 08/14/2018    Inflammatory bowel disease 08/14/2018    Kidney stone 08/14/2018    Myocardial infarction ([facility] ) 08/14/2018    Osteoporosis 08/14/2018    Otitis media 08/14/2018    Pneumonia 08/14/2018    Scoliosis 08/14/2018    Seizures ([facility] ) 08/14/2018    Shingles 08/14/2018    Stroke ([facility] ) 08/14/2018    Substance abuse ([facility] ) 08/14/2018    Urinary tract infection 08/14/2018    Visual impairment 08/14/2018     Past Surgical History:   Procedure Laterality Date    DENTAL SURGERY       Allergies   Allergen Reactions    Pollen Extract Allergic Rhinitis     Substance Abuse History:    Social History     Substance and Sexual Activity   Alcohol Use Yes    Alcohol/week: 9 0 - 12 0 standard drinks    Types: 9 - 12 Glasses of wine per week    Comment: Social     Social History     Substance and Sexual Activity   Drug Use No     Social History:    Social History     Socioeconomic History    Marital status: /Civil Union     Spouse name: Not on file    Number of children: Not on file    Years of education: Not on file    Highest education level: Bachelor's degree (e g , BA, AB, BS)   Occupational History    Occupation: Visiting RN   Tobacco Use    Smoking status: Never Smoker    Smokeless tobacco: Never Used   Vaping Use    Vaping Use: Never used   Substance and Sexual Activity    Alcohol use:  Yes     Alcohol/week: 9 0 - 12 0 standard drinks     Types: 9 - 12 Glasses of wine per week     Comment: Social    Drug use: No    Sexual activity: Yes   Other Topics Concern    Not on file   Social History Narrative        [employer] Full Time    Lives with spouse     Caffeine use    Uses safety equipment: seat belts     Social Determinants of Health     Financial Resource Strain: Not on file   Food Insecurity: Not on file   Transportation Needs: Not on file   Physical Activity: Not on file   Stress: Not on file   Social Connections: Not on file   Intimate Partner Violence: Not on file   Housing Stability: Not on file     Family Psychiatric History:     Family History   Problem Relation Age of Onset    Depression Mother     Osteoporosis Mother     Testicular cancer Father     Cancer Father     Hypertension Father     Cancer Family     Diabetes Family     Heart disease Family  Stroke Family         Stroke syndrome    Alcohol abuse Paternal Grandfather     Mental illness Paternal Grandmother     Drug abuse Neg Hx     Completed Suicide  Neg Hx      History Review: The following portions of the patient's history were reviewed and updated as appropriate: allergies, current medications, past family history, past medical history, past social history, past surgical history and problem list     OBJECTIVE:     Vital signs in last 24 hours: There were no vitals filed for this visit  Laboratory Results: I have personally reviewed all pertinent laboratory/tests results  Medications Risks/Benefits:      Risks, Benefits And Possible Side Effects Of Medications:    Discussed risks and benefits of treatment with patient including risk of suicidality, serotonin syndrome, increased QTc interval and SIADH related to treatment with antidepressants; Risk of induction of manic symptoms in certain patient populations      Controlled Medication Discussion:     Not applicable    CHAYITO Emmanuel 02/18/22    This note was shared with patient

## 2022-02-18 NOTE — PSYCH
Subjective:     Patient ID: Steve Cook is a 42 y o  female  [facility] Clinical Progress Notes      Specialized Services Documentation  Therapist must complete separate progress note for each specific clinical activity in which the individual participated during the day  Psychotherapeutic Group ([id number])    The group learned and discussed what happens in the body and mind during sleep, the consequences of poor sleep, things that cause poor sleep, and ways/skills to get to sleep/stay asleep  They shared methods that work, methods that don't, and learned new ones  There were multiple handouts and worksheets in addition to white board content  This group contained a large amount of discussion and sharing  Steve Cook shared actively and contributed to the group by providing methods of falling asleep that work, sharing in discussion, and completing the worksheet  Appears to be making progress  Discharge today      Tx Plan Objective: 1 1,1 2, 1 4   Therapist: Gus Haro MS

## 2022-02-18 NOTE — PSYCH
Subjective:     Patient ID: Lorri Child is a 42 y o  female  [facility] Clinical Progress Notes      Specialized Services Documentation  Therapist must complete separate progress note for each specific clinical activity in which the individual participated during the day  Case Management Note    Sulma Born MT-BC    Current suicide risk : Low     INDIVIDUAL PSYCHOTHERAPY  [id number] Met with Lorri Holden  Reviewed relapse prevention plan, aftercare plan, and medication list (copies provided)  Lorri Holden shared improvement through gaining tools to manage symptoms, increased awareness related to the importance of self care, and feeling more hopeful  Her PHQ-9 was a 16 upon admission and a 9 today  Work return letter sent and OP notified of discharge  Denied SI, HI, and psychosis  Aftercare providers to receive summary  Medications changes/added/denied? No    Treatment session number: 14    Individual Case Management Visit provided today? Yes     [facility] follow up physician's orders: See S   Denver Mouse, PA-C note and Dr Elena Parada MD order

## 2022-02-18 NOTE — PSYCH
Subjective:     Patient ID: Charanjit Landeros is a 42 y o  female  [facility] Discharge Summary:   Admission Date: 1/31/2022  Patient was referred by Sepideh Sanabria LCSW  Discharge Date: 02/18/22    Was this a routine discharge? yes   Diagnosis: Axis I:   1  Recurrent major depressive episodes, moderate ([address] )     2  Generalized anxiety disorder        Treating Physician: Deysi Catalan MD; Dr Aishwarya Dubois MD; Franklin Floyd NP  Treatment Complications: limited support    Presenting Need:   Per Dr Deysi Meyer MD:  Charanjit Landeros is a 42 y o  female with major depressive disorder, denies anxiety disorder who presented to this program secondary to increased depression, increased anxiety, sleep disturbances, increased appetite, lack of interest in doing things no energy, no motivation, decreased concentration, she feels hopeless, passive dead wishes and agitation  She has marital issues, work-related stressors, school stressors and family issues  She never had been admitted to a psychiatric unit  She has good compliant with treatment  She states that she feels depressed, she feels tired all the time, she had a sleep difficulties racing thoughts  She have strange relationship with her mother for the last 3 years, she has 1 sister date get along but she feels that she has limited support system  She states that she is very sensitive to medication  She states that she have some dead wishes but denies any plan or intent to herself  She had decrease interest in doing things, she feels hopeless, she has decreased concentration  She denies any psychotic symptoms or manic episodes  PHQ-9 is 16  Per this writer: Charanjit Landeros referred to [facility] by OP therapist due to continued helplessness/hopeless  She identifies anhedonia, exhaustion, sleep/appetite disturbance, decrease in concentration, passive SI, lack of trust in others, and isolation    She identified that she has been depressed throughout her life and she is "not sure this will help or work"  She presented as irritable and angry  She reports "marital issues" as the stressor but was vague throughout the interview  She reported that they "grew apart" but 3 months ago when she said something, he has started to make an effort but presents as she is still wanting to separate but they do not have the finances to do so (as well as not wanting to leave her animals)  She made it clear she does not trust providers or others  She repeated that she is "miserable" multiple times  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 so does not feel she has time for herself  Per Ericka Julian: "I don't understand how I am functioning"     Strengths: work ethic, love of animals    Course of treatment includes:    group counseling, medication management, individual case management, allied therapy, psychoeducation, psychiatric evaluation and individual therapy      Treatment Progress:   Ericka Julian attended 14 sessions in which objectives explored self-care, ways to build support, increasing hopefulness, and strategies to use in decision making related to relationships  [facility] was engaged in exercises in program yet struggled to feel she was able to apply them outside of program due to school obligations  Despite this, she did begin to explore some strategies to begin to challenge her options related to current relationships and her own values  She was encouraged to continue the work she started at [facility] with her OP provider  [facility] was also encouraged to begin to build boundaries with self in order to establish self care routines  Reviewed relapse prevention plan, aftercare plan, and medication list (copies provided)  Ericka Julian shared improvement through gaining tools to manage symptoms, increased awareness related to the importance of self care, and feeling more hopeful  Her PHQ-9 was a 16 upon admission and a 9 today   Work return letter sent and OP notified of discharge  Denied SI, HI, and psychosis  Aftercare providers to receive summary           Aftercare recommendations include:   Medication Management  Mary Anne Ty PA-C  [phone]  Please call to schedule     Outpatient Therapy  Emery Daniels  [phone]  Monday 2/21/2022 6pm    Discharge Medications include:  Current Outpatient Medications:     ALPRAZolam (XANAX) 0 5 mg tablet, Take 1 tablet (0 5 mg total) by mouth 3 (three) times a day as needed (as neded), Disp: 90 tablet, Rfl: 0    citalopram (CeleXA) 40 mg tablet, Take 1 tablet (40 mg total) by mouth daily, Disp: 90 tablet, Rfl: 1    ibuprofen (MOTRIN) 200 mg tablet, Take by mouth every 6 (six) hours as needed for mild pain, Disp: , Rfl:     omeprazole (PriLOSEC OTC) 20 MG tablet, Take 20 mg by mouth daily, Disp: , Rfl:     VITAMIN D PO, Take 1,000 Units by mouth daily, Disp: , Rfl:

## 2022-02-18 NOTE — PSYCH
[facility] Clinical Progress Notes      Specialized Services Documentation  Therapist must complete separate progress note for each specific clinical activity in which the individual participated during the day         [facility] follow up physician's orders:   DATE 2/18/2022  TIME 10:36 AM   DISCHARGE TODAY  Yuli Simmons MD

## 2022-02-18 NOTE — PSYCH
Assessment/Plan:      Diagnoses and all orders for this visit:    Recurrent major depressive episodes, moderate (HCC)    Generalized anxiety disorder          Subjective:     Patient ID: Bryant Cordoba is a 42 y o  female  [facility] Treatment Plan   AREAS OF NEED: Depression as evidenced by lack of energy, hopelessness, decreased concentration, anhedonia, agitation, sleep disturbance, excessive guilt, limited connections with others related to marital stressors and work/school stressors  Date Initiated: 1/27/2022    Strengths: work ethic, love of animals     LONG TERM GOAL:   Date Initiated: 1/27/2022  1 0 I will identify 3 signs that my depression has lessened and I feel more hopeful about my future  Target Date: 2/24/2022  Completion Date: 02/18/22        SHORT TERM OBJECTIVES:     Date Initiated: 1/27/2022  1 1 I will identify engaging in at least 1 pathway of hope daily - "normal routine, connection, spirituality, goal setting, and self-nurturing"  Revision Date: continue to work on 2/9/2022  Target Date: 2/9/2022  Completion Date: 02/18/22    Date Initiated: 1/27/2022  1 2 I will identify 3 things I can incorporate into my life for self care (outside of work and school)  Revision Date:  continue to work on 2/9/2022  Target Date: 2/9/2022  Completion Date: 02/18/22    Date Initiated: 1/27/2022  1 3 I will take medications as prescribed and share questions and concerns if arise  Revision Date: continue 2/9/2022  Target Date: 2/9/2022  Completion Date: 02/18/22    Date Initiated: 1/27/2022  1 4 I will identify 3 resources I can use for suppports     Revision Date: continue 2/9/2022  Target Date: 2/9/2022  Completion Date: 02/18/22         7 DAY REVISION:    Date Initiated:2/9/2022  1 5 I will identify 3 aspects of care I would like to continue to implement daily into my routine - practicing daily    Revision Date:   Target Date: 2/21/2022  Completion Date:02/18/22    Date Initiated:2/9/2022  1 6 I will begin to formulate a plan for my next steps in my relationship through exploring pros/cons and my values  Revision Date:   Target Date: 2/21/2022  Completion Date:02/18/22    PSYCHIATRY:  Date Initiated: 1/27/2022  Medication Management and Education       Revision Date: 2/9/2022        Continue medication management   The person(s) responsible for carrying out the plan is Ana Rosa Zapata MD; Fay Bridges NP    NURSING/SYMPTOM EDUCATION:  Date Initiated: 1/27/2022  1 1, 1 2  1 3, 1 4 This RN will provide wellness/symptoms and skill education groups three to five days weekly to educate Giorgio Hollingsworth on signs and symptoms of diagnoses, skills to manage, and medication questions that will be addressed by the treatment team     Revision date: 2/9/2022  1 1,1 2,1 3,1 4,1 5 Continue to encourage Giorgio Hollingsworth to participate in wellness groups daily to learn about symptoms, coping strategies and warning signs to promote relapse prevention  The person(s) responsible for carrying out the plan is CARISA Xavier, Sonali Allison MS    PSYCHOLOGY:   Date Initiated: 1/27/2022       1 1, 1 2, 1 4 Provide psychotherapy group 5 times per week to allow opportunity for Giorgio Hollingsworth  to explore stressors and ways of coping  Revision Date: 2/9/2022  1 1,1 2,1 4,1 5 Continue to provide psychotherapy group daily to Asher Sera encourage sharing of stressors, skills and positive change  The person(s) responsible for carrying out the plan is Kenroy Duron MA, LPC; Domingo Land, [address] [place] [address] Evy Lovell    ALLIED THERAPY:   Date Initiated: 1/27/2022  1 1,1 2 Engage Armin Coatesmissy Saraviagianfranco in AT group 5 times daily to encourage development and use of wellness tools to decrease symptoms and promote recovery through meaningful activity    Revision Date: 2/9/2022  1 1,1 2,1 5 Continue to engage Giorgio Hollingsworth to participate in AT group to practice wellness tools within program and transfer to home sharing successes and barriers through healthy task involvement  The person(s) responsible for carrying out the plan is Sarath Mcneil [facility]     CASE MANAGEMENT:   Date Initiated: 1/27/2022      1 0 This  will meet with Dorian Heredia  3-4 times weekly to assess treatment progress, discharge planning, connection to community supports and UR as indicated  Revision Date: 2/9/2022  1 0 Continue to meet with Dorian Heredia 3-4 times weekly to assess growth, work toward goals, continued treatment needs, dc planning and use of supports  The person(s) responsible for carrying out the plan is Smooth Franco [facility]      TREATMENT REVIEW/COMMENTS:     DISCHARGE CRITERIA: Identify 3 signs of progress and complete relapse prevention plan      DISCHARGE PLAN: Op care  Estimated Length of Stay:10 treatment days

## 2022-02-21 ENCOUNTER — SOCIAL WORK (OUTPATIENT)
Dept: BEHAVIORAL/MENTAL HEALTH CLINIC | Facility: CLINIC | Age: 47
End: 2022-02-21
Payer: COMMERCIAL

## 2022-02-21 DIAGNOSIS — F41.1 GENERALIZED ANXIETY DISORDER: ICD-10-CM

## 2022-02-21 DIAGNOSIS — F39 MOOD DISORDER (HCC): Primary | ICD-10-CM

## 2022-02-21 PROCEDURE — 90834 PSYTX W PT 45 MINUTES: CPT | Performed by: SOCIAL WORKER

## 2022-02-22 NOTE — PSYCH
Psychotherapy Provided: Individual Psychotherapy 50 minutes     Length of time in session: 50 minutes, follow up in 1 week    Encounter Diagnosis     ICD-10-CM    1  Mood disorder ([address] )  F39    2  Generalized anxiety disorder  F41 1        Goals addressed in session: Goal 1     Data:  Karyna Martines arrives 5 minutes early for her session  She reports tonight that she has completed PHP and feels that over all it was a good experience  But she confides that she cannot see herself using any of the skills she learned as she feels that she just does not have the time to commit to her self care  She continues to feel overwhelmed by circumstances she feels are beyond her control - working, tending to her 5 dogs, caring for her home, and working out the details of her relationship with her   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  She has decided, however, that she is going to leave the program when her current class ends  She realizes now that it consumes all of her non-working hours  Therapist supports this decision, recommending that she resign being careful to not burn any bridges  Therapist focuses the session on accessing Brandie's symptoms and current concerns, and updating her treatment plan  Karyna Martines shares that she continues "to feel all over the place" emotionally and therefore very indecisive  She confides that she feels hopeless about ever being happy, stating repeatedly that "[she] is a lost cause "  She adds that she is not sleeping and experiences SI  She asserts that she will never act on her thoughts because she fears the pain of death and "nothing being there afterwards "  Karyna Martines does share that she spent one night in a motel away from her  - and decided that she does not like to be away from home  She does not want to leave her home permanently but respects her 's demand that she not talk with the other man if she is going to remain living in the marital home  Lila Guevara has decided anyway that the other man is too unstable himself for her to carry on an intimate relationship  Therapist assisted Lila Guevara to identify exactly what she wants from a relationship - and recommended that she discuss this with her   Lilasharonda Guevara confides that he cannot meet her needs fully - She will not allow him because she is so bitter  Therapist suggested that she use her empathic skills to consider things from his perspective; he is now admitting that he made many mistakes during their marriage  We conclude by updating her treatment plan  Assessment:  Brandie's reported mood is depressed  She scores a 16 on the PHQ-9 tonight  Her affect is congruent; she is tearful  Brandie's thoughts are logical and goal oriented, but quite distorted and she engages in negative self talk  She speaks in superlatives/absolutes/very black and white  Lila Guevara admits that she considers suicide but states firmly that she would not act on her thoughts  She denies HI  Plan:  Lila Guevara will follow up in 1 week  At that time, we will review her homework - to be more empathic with regard to her   Also, we will review DBT skills  Pain:      moderate to severe    0    Current suicide risk : Evelyn Mcdaniel acknowledges that she experiences SI nearly daily but firmly states that she will not act on her thoughts  She does have the [facility] crisis number and agrees to proceed to the ER if her symptoms become worse  Behavioral Health Treatment Plan [facility]: Diagnosis and Treatment Plan explained to Lenora Stapleton relates understanding diagnosis and is agreeable to Treatment Plan   YES

## 2022-02-22 NOTE — BH TREATMENT PLAN
Sruthi Byers  1975       Date of Initial Treatment Plan: 4/16/21  Date of Current Treatment Plan: 02/22/22    Treatment Plan Number 3    Strengths/Personal Resources for Self Care: very empathic, goal oriented, loves her animals    Diagnosis:   1  Mood disorder ([address] )     2  Generalized anxiety disorder         Area of Needs: Depression/indecisiveness      Long Term Goal 1: "I would like to feel happier"    Target Date: 7/22/22  Completion Date: To be determined by therapist and Emre Duran Term Objectives for Goal 1:                       1  Rhonda Jackson will come to therapy and process thoughts and feelings regarding current stressors                                                                2  Rhonda Jackson will continue to examine her thoughts - and work to replace thoughts that are supporting the presence of symptoms (depression and anxiety) - especially her tendency to think in black & whites       3  Denia Leong will discuss skills learned in [facility] and practice using them to manage emotional reactivity                                                              4   Denia Leong will identify goals for self care, and take small, reasonable steps to achieve them                                                              5   Denia Leong will approach her  more empathically and consider if forgiveness may be an option                                                              6   Denia Leong will continue to participate in med management and follow recommendations                                                                Responsible: Rhonda Jackson and Therapist      GOAL 1: Modality: Individual/weekly/client centered, CBT/DBT, solution focused, mindfulness      [address]: Diagnosis and Treatment Plan explained to Claudio More relates understanding diagnosis and is agreeable to Treatment Plan         Client Comments : Please share your thoughts, feelings, need and/or experiences regarding your treatment plan: Darion Sapp reviewed this treatment plan in session with this therapist   She gave verbal consent to it due to Andre wyatt

## 2022-02-28 ENCOUNTER — SOCIAL WORK (OUTPATIENT)
Dept: BEHAVIORAL/MENTAL HEALTH CLINIC | Facility: CLINIC | Age: 47
End: 2022-02-28
Payer: COMMERCIAL

## 2022-02-28 DIAGNOSIS — F39 MOOD DISORDER (HCC): Primary | ICD-10-CM

## 2022-02-28 DIAGNOSIS — F41.1 GENERALIZED ANXIETY DISORDER: ICD-10-CM

## 2022-02-28 PROCEDURE — 90834 PSYTX W PT 45 MINUTES: CPT | Performed by: SOCIAL WORKER

## 2022-03-01 NOTE — PSYCH
Psychotherapy Provided: Individual Psychotherapy 45 minutes     Length of time in session: 45 minutes, follow up in 1 week    Encounter Diagnosis     ICD-10-CM    1  Mood disorder ([address] )  F39    2  Generalized anxiety disorder  F41 1        Goals addressed in session: Goal 1     Data:  Hemalatha Izquierdo reports that she remains depressed but describes her mood as "more upbeat "  She adds again that "it is all over the place," and explains that some weeks she is down and some weeks she is up  She reports feeling much better now that the semester has ended; she no longer feels pressure to perform perfectly  Hemalatha Izquierdo shares that she remains in her home with her  - who is now in [address]  [facility] with friends  He remains remorseful for not giving her the attention she deserved/wanted in the past   Therapist focuses on this and confronts Brandie's distorted ways of thinking  For example, she characterizes her "life as over" because she is in her early 45s and also insists that "all" people cannot be trusted  Therapist offers alternative ways of viewing issues and challenges Hemalatha Izquierdo to consider what specifically she seeks from her relationship with her   We conclude with a discussion of communication skills  Assessment:  Brandie's reported mood is improved but depressed  Her affect is congruent; she is tearful  Brandie's thoughts are logical and goal oriented but distorted  She reiterates that she cannot find time to implement skills learned in group  She continues to present with emotional dysregulation which suggests an underlying mood disorder and/or a personality disorder (some grandiose thinking - "No one is like me "  Therapist will suggest, again, that Hemalatha Izquierdo consider a psychiatric consult  Plan:  Hemalatha Izquierdo will follow up in 1 week  We will return to cognitive distortions and finding empathy for       Pain:      moderate to severe    0    Current suicide risk : Sammy Pennington [facility]: Diagnosis and Treatment Plan explained to Maribel Earl relates understanding diagnosis and is agreeable to Treatment Plan   YES

## 2022-03-08 ENCOUNTER — SOCIAL WORK (OUTPATIENT)
Dept: BEHAVIORAL/MENTAL HEALTH CLINIC | Facility: CLINIC | Age: 47
End: 2022-03-08
Payer: COMMERCIAL

## 2022-03-08 DIAGNOSIS — F39 MOOD DISORDER (HCC): Primary | ICD-10-CM

## 2022-03-08 DIAGNOSIS — F41.1 GENERALIZED ANXIETY DISORDER: ICD-10-CM

## 2022-03-08 DIAGNOSIS — F33.1 RECURRENT MAJOR DEPRESSIVE EPISODES, MODERATE (HCC): ICD-10-CM

## 2022-03-08 PROCEDURE — 90834 PSYTX W PT 45 MINUTES: CPT | Performed by: SOCIAL WORKER

## 2022-03-09 NOTE — PSYCH
Psychotherapy Provided: Individual Psychotherapy 55 minutes     Length of time in session: 55 minutes, follow up in 1 week    Encounter Diagnosis     ICD-10-CM    1  Mood disorder ([address] )  F39    2  Generalized anxiety disorder  F41 1    3  Recurrent major depressive episodes, moderate (HCC)  F33 1        Goals addressed in session: Goal 1     Data:  Denia Leong reports tonight that her mood remains "all over the place "  She remarks that she is depressed and angry "at all the people who have done [her] wrong "  She adds that she trusts "no one," and seeks to generally avoid everyone  At that same time, she states that she is very lonely  Therapist points out that these are examples of distortions and offers alternative cognitions that are less absolute  Therapist agrees that Denia Leong is sounding "Borderline" in response to her observation about herself; she admits that she blames "everyone" in her life for making her miserable - including her family, her 's family, and her   She states that she is powerless to change her circumstances and therefore must accept that "this is just how life is "  Therapist offers challenges to this statement and reiterates her recommendation that Denia Leong seek a med management consult  Therapist again suggests that Denia Leong may be suffering with a mood disorder due to her emotional lability, irritability, and lack of sleep each night  Jonnathan Barbara that she is reluctant to meet with a provider because she does not wish to reveal how much alcohol she is consuming reportedly up to 3 nights per week  When asked, she admits to drinking 3 bottles of wine several nights per week so she can sleep  She adds that she is fearful that her xanax will be taken away, but insists repeatedly that she is not abusing it    Therapist opines that Denia Leong has a drinking problem - and again firmly suggests that she meet with a provider and be truthful about her wine consumption or accept a referral to rehab   She becomes argumentative, insisting that she does not have a problem - then admitting that she does - and then asserting that it is "not the hospital's business "  She then states to the therapist that she does not trust the therapist; she expects that therapist to report her to HR  Therapist explains that she cannot break confidentiality unless/untill Raheel Gonzalez expresses SI/HI with a plan at which point therapist would recommend a trip to the ER  Raheel Gonzalez asserts at the conclusion of the session that "the system is broken," fears losing her job, and is now worried that she will be getting in trouble  Therapist attempts to reassure her but she leaves the session  Assessment:  Brandie's reported mood is irritable/angry/depressed  Her affect is congruent/She is tearful at times  Brandie's thoughts are logical and goal oriented but highly distorted  She presents as emotionally labile and makes sweeping gestures at times  She denies SI/HI  Raheel Gonzalez seemed tonight to display the push/pull commonly expressed by individuals suffering with Borderline Personality Disorder  Plan:  Raheel Gonzalez is scheduled to follow up in one week  At that time we will revisit her high rate of alcohol consumption  Pain:      moderate to severe    0    Current suicide risk : Low         Behavioral Health Treatment Plan [facility]: Diagnosis and Treatment Plan explained to Sherryle Drain relates understanding diagnosis and is agreeable to Treatment Plan   YES

## 2022-03-21 ENCOUNTER — SOCIAL WORK (OUTPATIENT)
Dept: BEHAVIORAL/MENTAL HEALTH CLINIC | Facility: CLINIC | Age: 47
End: 2022-03-21
Payer: COMMERCIAL

## 2022-03-21 DIAGNOSIS — F41.1 GENERALIZED ANXIETY DISORDER: ICD-10-CM

## 2022-03-21 DIAGNOSIS — F39 MOOD DISORDER (HCC): Primary | ICD-10-CM

## 2022-03-21 PROCEDURE — 90834 PSYTX W PT 45 MINUTES: CPT | Performed by: SOCIAL WORKER

## 2022-03-22 NOTE — PSYCH
Psychotherapy Provided: Individual Psychotherapy 55 minutes     Length of time in session: 55 minutes, follow up in 1 week    Encounter Diagnosis     ICD-10-CM    1  Mood disorder ([address] )  F39    2  Generalized anxiety disorder  F41 1        Goals addressed in session: Goal 1     Data:  Lila Guevara reports tonight that she considered canceling tonight's and all subsequent sessions  She adds that she was fearful of what this therapist had to say about our last session  Yevgeniy Peña "I was a jerk "  Therapist points out that Lila Guevara presented during last session as very irritable and possibly paranoid  Her mood was labile and she was using her emotion mind as referenced in DBT therapy  "That's how I get," Lila Guevara shares  Therapist reviews the purpose of therapy (to increase insight) and clarifies that the relationship is therapeutic = therapist makes recommendations based on what she believes as a professional to be in Brandie's best interest   Therapist also reiterates that confidentiality can only be broken if Lila Guevara expresses SI/HI with a plan; therapist has no "reporting power" to  or Brandie's manager  Therapist cannot assess fitness for duty, as Lila Guevara suspects  Therapist also opines that Brandie's use of alcohol to manage her mental health symptoms (e g  as she admits, depression symptoms and insomnia) is not healthy - and strongly recommends again that Lila Guevara seek a D/A assessment and at minimum be assessed by a psychiatric provider for medication management  Lila Guevara shares that she is reluctant to do so, claiming that the side effects of the medications she has tried in the past have been bad; she does not wish to feel tired or flat  Therapist provides additional psycho-ed re:  Mood Disorders and cautions Lila Guevara against acting impulsively, e g  consuming large amounts of alcohol, quitting nurse practitioner school, having an affair    Katthomasen Rumps understanding and states that she will give consideration to therapist's recommendations  We conclude the session by discussing her current relationship with her   She confides that she plans to stay in the marriage; he is doing more and more to demonstrate that he is committed according to Kade  Therapist challenges several distortions and offers alternative viewpoints  Assessment:  Brandie's reported mood is depressed  Her affect is congruent; she is tearful  Brandie's thoughts are logical and goal oriented  She confides that she continues to experience passive SI ("I just wish it would all stop") but names several protective factors - 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

## 2022-03-29 ENCOUNTER — SOCIAL WORK (OUTPATIENT)
Dept: BEHAVIORAL/MENTAL HEALTH CLINIC | Facility: CLINIC | Age: 47
End: 2022-03-29
Payer: COMMERCIAL

## 2022-03-29 DIAGNOSIS — F39 MOOD DISORDER (HCC): Primary | ICD-10-CM

## 2022-03-29 DIAGNOSIS — F41.1 GENERALIZED ANXIETY DISORDER: ICD-10-CM

## 2022-03-29 PROCEDURE — 90834 PSYTX W PT 45 MINUTES: CPT | Performed by: SOCIAL WORKER

## 2022-04-05 ENCOUNTER — TELEMEDICINE (OUTPATIENT)
Dept: INTERNAL MEDICINE CLINIC | Facility: CLINIC | Age: 47
End: 2022-04-05
Payer: COMMERCIAL

## 2022-04-05 VITALS — HEIGHT: 63 IN | TEMPERATURE: 97.6 F | BODY MASS INDEX: 33.3 KG/M2

## 2022-04-05 DIAGNOSIS — J01.10 ACUTE NON-RECURRENT FRONTAL SINUSITIS: Primary | ICD-10-CM

## 2022-04-05 PROBLEM — J06.9 UPPER RESPIRATORY TRACT INFECTION: Status: RESOLVED | Noted: 2021-11-01 | Resolved: 2022-04-05

## 2022-04-05 PROCEDURE — 99213 OFFICE O/P EST LOW 20 MIN: CPT | Performed by: PHYSICIAN ASSISTANT

## 2022-04-05 RX ORDER — FLUCONAZOLE 150 MG/1
150 TABLET ORAL ONCE
Qty: 1 TABLET | Refills: 0 | Status: SHIPPED | OUTPATIENT
Start: 2022-04-05 | End: 2022-04-05

## 2022-04-05 RX ORDER — AMOXICILLIN AND CLAVULANATE POTASSIUM 875; 125 MG/1; MG/1
1 TABLET, FILM COATED ORAL EVERY 12 HOURS SCHEDULED
Qty: 14 TABLET | Refills: 0 | Status: SHIPPED | OUTPATIENT
Start: 2022-04-05 | End: 2022-04-12

## 2022-04-05 NOTE — PROGRESS NOTES
Virtual Regular Visit    Verification of patient location:    Patient is located in the following state in which I hold an active license [place]      Assessment/Plan:    Problem List Items Addressed This Visit        Respiratory    Acute non-recurrent frontal sinusitis - Primary     Start Augmentin  Directions for use and possible side effects discussed and patient verbalized understanding of these  The patient was instructed to change their toothbrush to avoid reinfection  Relevant Medications    amoxicillin-clavulanate (Augmentin) 875-125 mg per tablet    fluconazole (DIFLUCAN) 150 mg tablet               Reason for visit is   Chief Complaint   Patient presents with    Cold Like Symptoms     *my chart* sinus congestion/pressure, sore throat, coughing, chest tightness, green mucus  started thurs/fri    Virtual Regular Visit        Encounter provider Citlalli Davis PA-C    Provider located at [address] [place] [id number]      Recent Visits  No visits were found meeting these conditions  Showing recent visits within past 7 days and meeting all other requirements  Today's Visits  Date Type Provider Dept   04/05/22 Telemedicine Evy Parnell PA-C  Jarod Haywood today's visits and meeting all other requirements  Future Appointments  No visits were found meeting these conditions  Showing future appointments within next 150 days and meeting all other requirements       The patient was identified by name and date of birth  Salvador Rodriguez was informed that this is a telemedicine visit and that the visit is being conducted through [facility] and patient was informed this is a secure, HIPAA-complaint platform  She agrees to proceed     My office door was closed  No one else was in the room  She acknowledged consent and understanding of privacy and security of the video platform   The patient has agreed to participate and understands they can discontinue the visit at any time  Patient is aware this is a billable service  Subjective  Autsen Gentile is a 42 y o  female    Sinusitis  This is a new problem  The current episode started in the past 7 days  The problem has been gradually worsening since onset  There has been no fever  Associated symptoms include congestion, coughing, ear pain, headaches, a hoarse voice, shortness of breath, sinus pressure and a sore throat  Pertinent negatives include no chills  Treatments tried: dayquil  The treatment provided mild relief  Past Medical History:   Diagnosis Date    Anxiety     Candidiasis of mouth     Last Assessed [id number]    Depression     Hypertension     Memory loss 4/7/2021    Obesity     Sciatica     Last Assessed [id number]    Skin rash     Last Assessed [id number]    Urticaria     Last Assessed [id number]       Past Surgical History:   Procedure Laterality Date   [facility] [place]         Current Outpatient Medications   Medication Sig Dispense Refill    ALPRAZolam (XANAX) 0 5 mg tablet Take 1 tablet (0 5 mg total) by mouth 3 (three) times a day as needed (as neded) 90 tablet 0    citalopram (CeleXA) 40 mg tablet Take 1 tablet (40 mg total) by mouth daily 90 tablet 1    ibuprofen (MOTRIN) 200 mg tablet Take by mouth every 6 (six) hours as needed for mild pain      omeprazole (PriLOSEC OTC) 20 MG tablet Take 20 mg by mouth daily      VITAMIN D PO Take 1,000 Units by mouth daily      amoxicillin-clavulanate (Augmentin) 875-125 mg per tablet Take 1 tablet by mouth every 12 (twelve) hours for 7 days 14 tablet 0    fluconazole (DIFLUCAN) 150 mg tablet Take 1 tablet (150 mg total) by mouth once for 1 dose 1 tablet 0     No current facility-administered medications for this visit  Allergies   Allergen Reactions    Pollen Extract Allergic Rhinitis       Review of Systems   Constitutional: Negative for chills and fever     HENT: Positive for congestion, ear pain, hoarse voice, postnasal drip, rhinorrhea, sinus pressure, sinus pain and sore throat  Negative for hearing loss and trouble swallowing  Eyes: Negative for pain and visual disturbance  Respiratory: Positive for cough and shortness of breath  Negative for chest tightness and wheezing  Cardiovascular: Negative  Negative for chest pain, palpitations and leg swelling  Gastrointestinal: Negative for abdominal pain, blood in stool, constipation, diarrhea, nausea and vomiting  Endocrine: Negative for cold intolerance, heat intolerance, polydipsia, polyphagia and polyuria  Genitourinary: Negative for difficulty urinating, dysuria, flank pain and urgency  Musculoskeletal: Negative for arthralgias, back pain, gait problem and myalgias  Skin: Negative for rash  Allergic/Immunologic: Negative  Neurological: Positive for headaches  Negative for dizziness, weakness and light-headedness  Hematological: Negative  Psychiatric/Behavioral: Negative for behavioral problems, dysphoric mood and sleep disturbance  The patient is not nervous/anxious  Video Exam    Vitals:    04/05/22 0954   Temp: 97 6 °F (36 4 °C)   Height: 5' 3" (1 6 m)       Physical Exam  Nursing note reviewed  Constitutional:       Appearance: She is well-developed  She is ill-appearing  HENT:      Head: Normocephalic and atraumatic  Musculoskeletal:         General: Normal range of motion  Cervical back: Normal range of motion  Neurological:      Mental Status: She is alert  Psychiatric:         Behavior: Behavior normal          Thought Content: Thought content normal          Judgment: Judgment normal           I spent 15 minutes directly with the patient during this visit    VIRTUAL VISIT [address] verbally agrees to participate in [employer]   Pt is aware that [employer] could be limited without vital signs or the ability to perform a full hands-on physical exam  Brandie Gloria understands she or the provider may request at any time to terminate the video visit and request the patient to seek care or treatment in person

## 2022-04-05 NOTE — LETTER
April 5, 2022     Patient: Sathish Silveira   YOB: 1975   Date of Visit: 4/5/2022       To Whom it May Concern:    Faustina Marquez is under my professional care  She was seen in my office on 4/5/2022  She may return to work on 4/7/22  If you have any questions or concerns, please don't hesitate to call           Sincerely,          Evy Parnell PA-C        CC: No Recipients

## 2022-04-05 NOTE — ASSESSMENT & PLAN NOTE
Start Augmentin  Directions for use and possible side effects discussed and patient verbalized understanding of these  The patient was instructed to change their toothbrush to avoid reinfection

## 2022-04-20 ENCOUNTER — TELEMEDICINE (OUTPATIENT)
Dept: BEHAVIORAL/MENTAL HEALTH CLINIC | Facility: CLINIC | Age: 47
End: 2022-04-20
Payer: COMMERCIAL

## 2022-04-20 DIAGNOSIS — F41.1 GENERALIZED ANXIETY DISORDER: ICD-10-CM

## 2022-04-20 DIAGNOSIS — F39 MOOD DISORDER (HCC): Primary | ICD-10-CM

## 2022-04-20 PROCEDURE — 90834 PSYTX W PT 45 MINUTES: CPT | Performed by: SOCIAL WORKER

## 2022-05-03 ENCOUNTER — SOCIAL WORK (OUTPATIENT)
Dept: BEHAVIORAL/MENTAL HEALTH CLINIC | Facility: CLINIC | Age: 47
End: 2022-05-03
Payer: COMMERCIAL

## 2022-05-03 DIAGNOSIS — F39 MOOD DISORDER (HCC): Primary | ICD-10-CM

## 2022-05-03 DIAGNOSIS — F41.1 GENERALIZED ANXIETY DISORDER: ICD-10-CM

## 2022-05-03 PROCEDURE — 90834 PSYTX W PT 45 MINUTES: CPT | Performed by: SOCIAL WORKER

## 2022-05-17 ENCOUNTER — SOCIAL WORK (OUTPATIENT)
Dept: BEHAVIORAL/MENTAL HEALTH CLINIC | Facility: CLINIC | Age: 47
End: 2022-05-17
Payer: COMMERCIAL

## 2022-05-17 DIAGNOSIS — F39 MOOD DISORDER (HCC): Primary | ICD-10-CM

## 2022-05-17 DIAGNOSIS — F41.1 GENERALIZED ANXIETY DISORDER: ICD-10-CM

## 2022-05-17 PROCEDURE — 90834 PSYTX W PT 45 MINUTES: CPT | Performed by: SOCIAL WORKER

## 2022-05-17 NOTE — PSYCH
Psychotherapy Provided: Individual Psychotherapy 45 minutes     Length of time in session: 45 minutes, follow up in 1 week    Encounter Diagnosis     ICD-10-CM    1  Mood disorder ([address] )  F39    2  Generalized anxiety disorder  F41 1        Goals addressed in session: Goal 1     Data: Cordell Long reports having a good week with her , and even a good weekend with her mother shopping in [employer]  She reports, however, that her mood continues to be "all over the place "  She also continues to drink alcohol regularly in the evenings and shares that she often binges on food  Therapist discusses mindfulness, and challenges Cordell Long to mindfully consider what she is thinking when she reaches for alcohol/food  She reflects that often she is seeking comfort - and she attributes this to the fact that she did not get a lot of attention when she was a child  Therapist reminds Cordell Long that she is no longer living in the past and she is an adult capable of making wise choices to meet her needs  Cordell Long shares at length that she is struggling with her body image and aging in general   She concludes by agreeing to take steps to be mindful of patterns that lead her to binge eat  Assessment:  Brandie's reported mood is depressed "as usual "  Her affect is congruent  She is cooperative, and her thoughts are logical and goal oriented  She denies SI/HI  Plan:  Cordell Long will follow up in 1 week  We will return to self care  Pain:      moderate to severe    0    Current suicide risk : Low         Behavioral Health Treatment Plan  [facility]: Diagnosis and Treatment Plan explained to Dwayne Patel relates understanding diagnosis and is agreeable to Treatment Plan   YES

## 2022-05-25 ENCOUNTER — TELEPHONE (OUTPATIENT)
Dept: INTERNAL MEDICINE CLINIC | Facility: CLINIC | Age: 47
End: 2022-05-25

## 2022-05-25 DIAGNOSIS — B34.9 VIRAL INFECTION, UNSPECIFIED: Primary | ICD-10-CM

## 2022-05-25 PROCEDURE — 87636 SARSCOV2 & INF A&B AMP PRB: CPT | Performed by: PHYSICIAN ASSISTANT

## 2022-05-25 NOTE — TELEPHONE ENCOUNTER
Regarding: FW: Sick? Please reach out to pt and see if she would like a PCR covid and flu swab  ----- Message -----  From: Homa Nieves  Sent: 5/24/2022   3:09 PM EDT  To: Malinda Casey PA-C  Subject: Sick?                                            ----- Message from Homa Nieves sent at 5/24/2022  3:09 PM EDT -----       ----- Message from Lalo Brandt to Malinda Casey PA-C sent at 5/24/2022  2:46 PM -----   Ramon Perez know if you could possibly give me a call, I dont think its worth a virtual visit, my  tested positive for Covid last week, last Wednesday, he is doing better he had a fever for about a day, I came home from work yesterday and all the sudden it just slammed me, I had a severe headache for the last two days and I dont even get headaches like these from my sinus problems Ive been nauseous my heart has been pounding and Im lightheaded, I took three tests since yesterday and they are all negative and I am afebrile, I woke up with a sore throat this morning, I feel almost exactly like I did when I first got Covid, I might be paranoid about getting it because of what happened the first time to me, but my question is have you heard of any false negatives?  I never feel this crappy with a common cold or a sinus infection I havent felt this bad since I had Covid in 2020, and now with these policies at work I am still working because technically I can

## 2022-05-26 LAB
FLUAV RNA RESP QL NAA+PROBE: NEGATIVE
FLUBV RNA RESP QL NAA+PROBE: NEGATIVE
SARS-COV-2 RNA RESP QL NAA+PROBE: POSITIVE

## 2022-05-31 ENCOUNTER — SOCIAL WORK (OUTPATIENT)
Dept: BEHAVIORAL/MENTAL HEALTH CLINIC | Facility: CLINIC | Age: 47
End: 2022-05-31
Payer: COMMERCIAL

## 2022-05-31 DIAGNOSIS — F39 MOOD DISORDER (HCC): Primary | ICD-10-CM

## 2022-05-31 DIAGNOSIS — F41.1 GENERALIZED ANXIETY DISORDER: ICD-10-CM

## 2022-05-31 PROCEDURE — 90834 PSYTX W PT 45 MINUTES: CPT | Performed by: SOCIAL WORKER

## 2022-06-01 NOTE — PSYCH
Psychotherapy Provided: Individual Psychotherapy 50 minutes     Length of time in session: 50 minutes, follow up in 2 weeks    Encounter Diagnosis     ICD-10-CM    1  Mood disorder ([address] )  F39    2  Generalized anxiety disorder  F41 1        Goals addressed in session: Goal 1     Data:  Ayla Palacio presents tonight reporting that she has just gotten over COVID  She laments that she may have exposed others to the virus, commenting that she would be devastated to learn that anyone  from [place] from her  Ayla Palacio also shares that her depression symptoms remain moderate-severe  She comments that she is easily triggered, and notes that she becomes upset when seeing road kill  She is also greatly troubled by the news of the shooting in [place] and the war in [place] and is "obsessed" with following these stories  She worries that people are dying painful deaths and she has resolved that the world is "doomed "  Therapist addresses these concerns by pointing out to Ayla Palacio that all she really can do is focus on 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  We discuss intentionally trying "to do no harm," and therapist encourages Ayla Palacio to turn off the news  Therapist again recommends that Ayla Palacio seek a consult for mediation with a provider at [address] [place] [address] - but she is resistant as she states that she does not want anyone to know that she consumes alcohol regularly  Therapist offers to refer her for a D/A evaluation and she refuses  Assessment:  Brandie's reported mood is depressed  She adds that she is anxious  Her affect is congruent; she is tearful  Brandie's thoughts are logical and goal oriented; she recognizes when she engages in distorted ways of thinking and makes an effort to correct her thoughts  Ayla Palacio denies SI/HI  Plan:  Ayla Palacio will follow up in 2 weeks  Therapist will suggest that she seek a psychiatric consult with a provider outside the Network      Pain:      moderate to severe    0    Current suicide risk : Low         Behavioral Health Treatment Plan [facility]: Diagnosis and Treatment Plan explained to Betty Paz relates understanding diagnosis and is agreeable to Treatment Plan   YES

## 2022-06-21 ENCOUNTER — SOCIAL WORK (OUTPATIENT)
Dept: BEHAVIORAL/MENTAL HEALTH CLINIC | Facility: CLINIC | Age: 47
End: 2022-06-21
Payer: COMMERCIAL

## 2022-06-21 DIAGNOSIS — F41.1 GENERALIZED ANXIETY DISORDER: ICD-10-CM

## 2022-06-21 DIAGNOSIS — F39 MOOD DISORDER (HCC): Primary | ICD-10-CM

## 2022-06-21 PROCEDURE — 90834 PSYTX W PT 45 MINUTES: CPT | Performed by: SOCIAL WORKER

## 2022-06-21 NOTE — PSYCH
Psychotherapy Provided: Individual Psychotherapy 45 minutes     Length of time in session: 45 minutes, follow up in 1 week    Encounter Diagnosis     ICD-10-CM    1  Mood disorder ([address] )  F39    2  Generalized anxiety disorder  F41 1        Goals addressed in session: Goal 1     Data:  Ortiz Kim presents louis exclaiming, "Everything is really bad "  She gestures wildly as she describes conditions at work  She tearfully reports that she has been overwhelmed with working conditions and she fears that "[she] will have a nervous breakdown "  She does have an appointment with her manager tomorrow and plans to talk about her needs  Ortiz Kim shares that she recently had an text exchange with this manager in which she voiced frustration with "being called out in front of everyone in a meeting "  Her manager assured her that this was not her intention and apologized  Nonetheless, Ortiz Kim feels that she is being treated unfairly and speaks about "things being scandalous at work "  Therapist focuses the session on validating Brandie's thoughts/feelings but cautioning her against taking things at work too personally  Therapist again suggests that she seek consultation with a psychiatric provider as she presents as hypomanic tonight  Therapist points out that she appears to be racing; she is talking fast and gesturing; her affect appears expansive  Ortizblas Kim confides that she has felt like she is racing, things are speeded up leaving her irritable  She also reports chest pain and heart palpitations  She has called her PCP and has an appointment scheduled for August   Therapist encourages her to proceed to the ER if she feels worse  Ortiz Kim agrees  Assessment:  Brandie's reported mood is irritable  Her affect is expansive, but at times tearful  She speaks fast and loud  She appears to be hypomanic    Ortiz Kim does deny SI/HI but makes the statement that "life is terrible "  She also admits to drinking heavily in the evenings - 1 to 2 bottles of wine  Therapist firmly recommends that she consult a psychiatric provider, even suggesting that she seek an independent provider in [address] since she is concerned about the Network knowing about her abuse of alcohol  Jillian Justin does agree to discuss her mood symptoms with her PCP  Plan:  Jillian Justin will follow up in 1 week  We will process conversation with manager  Pain:      moderate to severe    0    Current suicide risk : Low         Behavioral Health Treatment Plan [facility]: Diagnosis and Treatment Plan explained to Erin Jones relates understanding diagnosis and is agreeable to Treatment Plan   YES

## 2022-06-24 NOTE — PSYCH
PHYSICAL THERAPY TREATMENT NOTE - INPATIENT     Room Number: 707/920-X       Presenting Problem: s/p left TKA 6/23/22    Problem List  Principal Problem:    Primary osteoarthritis of left knee  Active Problems:    Essential hypertension    Acquired hypothyroidism    Hyperlipidemia      PHYSICAL THERAPY ASSESSMENT   Chart reviewed. RN Keely Ahuja approved participation in physical therapy. PPE worn by therapist: mask and gloves. Patient was wearing a mask during session. Patient presented in bedside chair with did not rate pain. Patient with good  progress towards goals during this session. Education provided on Total knee exercise protocol, Physical therapy plan of care and physiological benefits of out of bed mobility. Patient with good carryover. Pt is seen today BID for therapy. Pt is received in the chair during both therapy sessions. Pt is CGA/SBA with sit<>stand transfers with the RW. Pt is cued for proper hand placement for safety. Pt was able to AMB in the am session about 150' with the RW CGA and 250' with the RW SBA pm session. Pt with decreased nai and step length but with very good balance and safety awareness. Pt reported little pain during both sessions. Pt was brought to the therapy gym for stair training. Pt was educated and able to negotiate 8 stairs with 1 HR CGA. Pt is cued for proper sequencing and technique. Pt also with very good balance and safety awareness. Returned pt back to the room and to sitting in the chair with all needs within reach. Pt is on track to possibly dc to home today if medically cleared. Reported to the RN on the status of the pt. Bed mobility: NT  Transfers: Contact guard assist am/SBA pm  Gait Assistance: Contact guard assist am/SBA pm  Distance (ft): 150' am/250' pm  Assistive Device: Rolling walker  Pattern: L Decreased stance time          . Patient was left in bedside chair at end of session with all needs in reach.  The patient's Approx Degree of Impairment: Psychotherapy Provided: Individual Psychotherapy 38 minutes     Length of time in session: 38 minutes, follow up in 1 week    Encounter Diagnosis     ICD-10-CM    1  Mood disorder ([address] )  F39    2  Generalized anxiety disorder  F41 1        Goals addressed in session: Goal 1     Data:  Valencia Soriano presents tonight from her car  She just finished working  Valencia Soriano shares that her mood is somewhat improved but notes that it is likely to change again - and fairly rapidly  She shares that she recently read an article about Bipolar Disorder and she is now more convinced that she may be suffering with the illness  She notes that her moods are generally "all over the place" and she is often irritable with her  and others  Historically, she adds that she has engaged in "temper tantrums" when not take an anti-depressant, and she adds that she is very impatient when driving  Valencia Soriano states that she plans to make an appointment with her PCP to discuss mood stabilizers; therapist again recommends that she consult a psychiatry provider, however  Valencia Soriano shares that she is reluctant to do so because she "trusts" her PCP  Therapist focuses the session on reviewing Brandie's behavior over the last several months - especially the fact that she considered having an affair  She confesses that she is ashamed of having considered this, and adds "I just don't know what I was thinking "  Therapist opines that this was possibly hypomanic/hyper sexualized behavior - and works with Valencia Soriano to reflect on her values  Valecnia Soriano does recall therapist cautioning her against making big decisions over the last few months and she expresses gratitude for this  She states "I was thinking clearly "  We conclude with a discussion of good judgement and self monitoring  Assessment:  Brandie's reported mood is improved  She states that she is feeling "pretty good" tonight    She continues to have concerns about work, but states that she is more accepting of 46.58% has been calculated based on documentation in the [facility] '6 clicks' Inpatient Basic Mobility Short Form. Research supports that patients with this level of impairment may benefit from Home with home health PT. RN aware of patient status post session. DISCHARGE RECOMMENDATIONS  PT Discharge Recommendations: Home with home health PT     PLAN  PT Treatment Plan: Bed mobility; Body mechanics; Coordination; Endurance; Patient education;Gait training;Range of motion;Strengthening;Stoop training;Stair training;Transfer training;Balance training    SUBJECTIVE  Pt was agreeable to therapy sessions. OBJECTIVE  Precautions: Limb alert - left    WEIGHT BEARING RESTRICTION  Weight Bearing Restriction: L lower extremity           L Lower Extremity: Weight Bearing as Tolerated    PAIN ASSESSMENT   Rating:  (did not rate)  Location: L knee  Management Techniques: Activity promotion; Body mechanics; Relaxation;Repositioning    BALANCE                                                                                                                       Static Sitting: Good  Dynamic Sitting: Good           Static Standing: Fair +  Dynamic Standing: Fair -    ACTIVITY TOLERANCE           BP: 100/65  BP Location: Right arm  BP Method: Automatic  Patient Position: Sitting    O2 WALK  Oxygen Therapy  SPO2% on Room Air at Rest: 69    AM-PAC '6-Clicks' INPATIENT SHORT FORM - BASIC MOBILITY  How much difficulty does the patient currently have. .. Patient Difficulty: Turning over in bed (including adjusting bedclothes, sheets and blankets)?: A Little   Patient Difficulty: Sitting down on and standing up from a chair with arms (e.g., wheelchair, bedside commode, etc.): A Little   Patient Difficulty: Moving from lying on back to sitting on the side of the bed?: A Little   How much help from another person does the patient currently need. ..    Help from Another: Moving to and from a bed to a chair (including a wheelchair)?: A Little   Help the fact that work "will always be a big stressor "  She agrees to work on compartmentalizing that part of her life  Brandie's affect is appropriate  She is cooperative, and her thoughts are logical and goal oriented  She denies SI/HI  Plan:  Kenyetta Cordoba will follow up in one week  She will reach out to her PCP for an appointment this week  Pain:      mild emotional distress expressed    0    Current suicide risk : Low         Behavioral Health Treatment Plan [facility]: Diagnosis and Treatment Plan explained to Lobo Darline relates understanding diagnosis and is agreeable to Treatment Plan  YES    Virtual Regular Visit    Verification of patient location:    Patient is located in the following state in which I hold an active license [place]      Assessment/Plan:    Problem List Items Addressed This Visit        Other    Generalized anxiety disorder    Mood disorder ([address] ) - Primary          Goals addressed in session: Goal 1          Reason for visit is   Chief Complaint   Patient presents with    Virtual Regular Visit        Encounter provider Ghassan Moon LCSW    Provider located at [address] [place] [id number] [phone]      Recent Visits  No visits were found meeting these conditions  Showing recent visits within past 7 days and meeting all other requirements  Future Appointments  No visits were found meeting these conditions  Showing future appointments within next 150 days and meeting all other requirements       The patient was identified by name and date of birth  Cindy Cameron was informed that this is a telemedicine visit and that [facility] and patient was informed this is a secure, HIPAA-complaint platform  She agrees to proceed     My office door was closed  No one else was in the room    She acknowledged consent and understanding of privacy and security from Another: Need to walk in hospital room?: A Little   Help from Another: Climbing 3-5 steps with a railing?: A Little     AM-PAC Score:  Raw Score: 18   Approx Degree of Impairment: 46.58%   Standardized Score (AM-PAC Scale): 43.63   CMS Modifier (G-Code): CK        Patient End of Session: Up in chair;Needs met;Call light within reach;RN aware of session/findings; All patient questions and concerns addressed    CURRENT GOALS   Goals to be met by: 6/25/22  Patient Goal Patient's self-stated goal is: to go home with her sister   Goal #1 Patient is able to demonstrate supine - sit EOB @ level: modified independent     Goal #1   Current Status NT am/pm   Goal #2 Patient is able to demonstrate transfers Sit to/from Stand at assistance level: modified independent     Goal #2  Current Status CGA with the RW am  SBA with the RW pm   Goal #3 Patient is able to ambulate 150 feet with assistive device at assistance level: modified independent    Goal #3   Current Status 150' with the RW CGA am  250' with the RW SBA pm   Goal #4 Patient will negotiate 4 stairs/one curb w/ assistive device and supervision   Goal #4   Current Status 8 stairs with 1 HR CGA pm   Goal #5  AROM 0 degrees extension to 95 degrees flexion     Goal #5   Current Status IN PROGRESS   Goal #6 Patient independently performs home exercise program for ROM/strengthening per the instructions provided in preparation for discharge.    Goal #6  Current Status IN PROGRESS of the video platform  The patient has agreed to participate and understands they can discontinue the visit at any time  Patient is aware this is a billable service  Subjective  Priyanka Urias is a 42 y o  female    HPI     Past Medical History:   Diagnosis Date    Anxiety     Candidiasis of mouth     Last Assessed 21Apr2014    Depression     Hypertension     Memory loss 4/7/2021    Obesity     Sciatica     Last Assessed [id number]    Skin rash     Last Assessed [id number]    Urticaria     Last Assessed [id number]       Past Surgical History:   Procedure Laterality Date   [facility]         Current Outpatient Medications   Medication Sig Dispense Refill    ALPRAZolam (XANAX) 0 5 mg tablet Take 1 tablet (0 5 mg total) by mouth 3 (three) times a day as needed (as neded) 90 tablet 0    citalopram (CeleXA) 40 mg tablet Take 1 tablet (40 mg total) by mouth daily 90 tablet 1    ibuprofen (MOTRIN) 200 mg tablet Take by mouth every 6 (six) hours as needed for mild pain      omeprazole (PriLOSEC OTC) 20 MG tablet Take 20 mg by mouth daily      VITAMIN D PO Take 1,000 Units by mouth daily       No current facility-administered medications for this visit  Allergies   Allergen Reactions    Pollen Extract Allergic Rhinitis       Review of Systems    Video Exam    There were no vitals filed for this visit  Physical Exam     I spent 38 minutes directly with the patient during this visit    VIRTUAL VISIT Sommer Li  verbally agrees to participate in [employer]  Pt is aware that [employer] could be limited without vital signs or the ability to perform a full hands-on physical exam  Brandie Gloria understands she or the provider may request at any time to terminate the video visit and request the patient to seek care or treatment in person

## 2022-06-28 ENCOUNTER — SOCIAL WORK (OUTPATIENT)
Dept: BEHAVIORAL/MENTAL HEALTH CLINIC | Facility: CLINIC | Age: 47
End: 2022-06-28
Payer: COMMERCIAL

## 2022-06-28 DIAGNOSIS — F39 MOOD DISORDER (HCC): Primary | ICD-10-CM

## 2022-06-28 DIAGNOSIS — F41.1 GENERALIZED ANXIETY DISORDER: ICD-10-CM

## 2022-06-28 PROCEDURE — 90834 PSYTX W PT 45 MINUTES: CPT | Performed by: SOCIAL WORKER

## 2022-06-29 NOTE — PSYCH
Psychotherapy Provided: Individual Psychotherapy 50 minutes     Length of time in session: 50 minutes, follow up in 1 week    Encounter Diagnosis     ICD-10-CM    1  Mood disorder ([address] )  F39    2  Generalized anxiety disorder  F41 1        Goals addressed in session: Goal 1     Data:  Arpita Jordan reports that she continues to feel very depressed and anxious  She is concerned about the political affairs in the [address] - and talks at length about the [id number] [address] decision  She also shares that work has her highly stressed  Again she explains that in her opinion the conversion to Epic was poor and she is particularly upset about the scheduling of patients  She adds that she is often required to see patients that she has not seen previously; For this reason, she feels that she is always re-inventing the wheel  Arpita Jordan reports that she has sent complaints/recommendations via email to her Manager, and notes that more recently she has not been hearing back  She fears that she is considered "a complainer" now  Therapist again recommends that she does not take her job too personally and that she preserve time in the evenings to focus on other things  Arpita Jordan maintains that 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; therapist suggests that she seek assistance with time/task management, as this should not be the case  We conclude with therapist again recommending that Arpita Jordan seek a consult with a psychiatric provider for a possible mood disorder - even if out of the [employer]  Arpita Jordan again states that she will give it consideration  Assessment:  Brandie's reported mood is "all over the place" - anxious and depressed  Her affect is congruent; she is tearful  Her thoughts are logical and goal oriented but she seems to be preoccupied with the paranoid belief that her Managers are laughing at her behind closed doors    She also believes that they and others may be reading her medical record, referring to it as her "medical resume "  Ailyn Mckinnon fears that she will lose her job if the Network learns that she drinks alcohol outside of work  Ailyn Mckinnon denies SI/HI, but she asks about signing herself into inpatient  This makes no sense as she consistently reports that she does not want to be honest with her PCP/psychiatric providers about her symptoms or abuse of alcohol  Plan:  Ailyn Mckinnon will follow up in 1 week  At that time we will complete a Core Values exercise  Pain:      moderate to severe    0    Current suicide risk : Low         Behavioral Health Treatment Plan [facility]: Diagnosis and Treatment Plan explained to Angusia Meera relates understanding diagnosis and is agreeable to Treatment Plan   YES

## 2022-07-05 ENCOUNTER — TELEMEDICINE (OUTPATIENT)
Dept: BEHAVIORAL/MENTAL HEALTH CLINIC | Facility: CLINIC | Age: 47
End: 2022-07-05
Payer: COMMERCIAL

## 2022-07-05 DIAGNOSIS — F39 MOOD DISORDER (HCC): Primary | ICD-10-CM

## 2022-07-05 DIAGNOSIS — F41.1 GENERALIZED ANXIETY DISORDER: ICD-10-CM

## 2022-07-05 PROCEDURE — 90834 PSYTX W PT 45 MINUTES: CPT | Performed by: SOCIAL WORKER

## 2022-07-05 NOTE — PSYCH
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  She does share that she met with her manager last week and the  of Visiting Nurses  She reports that they were very supportive of her suggestions and complimentary of her nursing skills  Nonetheless, Ino Zambrano states that she does not feel good about herself  "I don't trust them," she remarks  Therapist points out related distorted ways of thinking and offers a positive spin on what Ino Zambrano is reporting tonight  She does share that she continues to drink heavily in the evenings and again therapist confronts her alcohol abuse  She does not wish to seek care for this behavior and remains reluctant to see a psychiatric provider  Therapist focuses the remainder of the session on self esteem, as Ino Zambrano shares that she is "obsessed" with how poorly she looks  "I'm aging and I can't stand it "  Therapist works with her to identify one physical quality that she finds pleasing and recommends that she begin to think about her core values - to be addressed using a worksheet next session  Assessment:  Brandie's reported mood is depressed/anxious  Her affect is congruent  Her thoughts are linear and goal oriented but she does engage in cognitively distorted ways of thinking  She denies SI/HI  Therapist is beginning to suspect that Ino Zambrano may be suffering with Borderline Personality Disorder  Plan:  Ino Zambrano will follow up in one week  Pain:      moderate to severe    0    Current suicide risk : Low         Behavioral Health Treatment Plan [facility]: Diagnosis and Treatment Plan explained to Epifanio Méndez relates understanding diagnosis and is agreeable to Treatment Plan  YES    Virtual Regular Visit    Verification of patient location:    Patient is located in the following state in which I hold an active license [place]      Assessment/Plan:    Problem List Items Addressed This Visit        Other    Generalized anxiety disorder    Mood disorder ([facility] [address] ) - Primary          Goals addressed in session: Goal 1          Reason for visit is   Chief Complaint   Patient presents with    Virtual Regular Visit        Encounter provider Michael High LCSW    Provider located at [address] [place] [id number] [phone]      Recent Visits  No visits were found meeting these conditions  Showing recent visits within past 7 days and meeting all other requirements  Future Appointments  No visits were found meeting these conditions  Showing future appointments within next 150 days and meeting all other requirements       The patient was identified by name and date of birth  Jorge Verduzco was informed that this is a telemedicine visit and that the visit is being conducted throughic Embedded and patient was informed this is a secure, HIPAA-complaint platform  She agrees to proceed     My office door was closed  No one else was in the room  She acknowledged consent and understanding of privacy and security of the video platform  The patient has agreed to participate and understands they can discontinue the visit at any time  Patient is aware this is a billable service  Subjective  Jorge Verduzco is a 41 y o  female          HPI     Past Medical History:   Diagnosis Date    Anxiety     Candidiasis of mouth     Last Assessed [id number]    Depression  Hypertension     Memory loss 4/7/2021    Obesity     Sciatica     Last Assessed [id number]    Skin rash     Last Assessed [id number]    Urticaria     Last Assessed [id number]       Past Surgical History:   Procedure Laterality Date   [facility] [place] [facility]         Current Outpatient Medications   Medication Sig Dispense Refill    ALPRAZolam (XANAX) 0 5 mg tablet Take 1 tablet (0 5 mg total) by mouth 3 (three) times a day as needed (as neded) 90 tablet 0    citalopram (CeleXA) 40 mg tablet Take 1 tablet (40 mg total) by mouth daily 90 tablet 1    ibuprofen (MOTRIN) 200 mg tablet Take by mouth every 6 (six) hours as needed for mild pain      omeprazole (PriLOSEC OTC) 20 MG tablet Take 20 mg by mouth daily      VITAMIN D PO Take 1,000 Units by mouth daily       No current facility-administered medications for this visit  Allergies   Allergen Reactions    Pollen Extract Allergic Rhinitis       Review of Systems    Video Exam    There were no vitals filed for this visit  Physical Exam     I spent 45 minutes directly with the patient during this visit    VIRTUAL VISIT Sommer Li  verbally agrees to participate in [employer]  Pt is aware that [employer] could be limited without vital signs or the ability to perform a full hands-on physical exam  Brandie Gloria understands she or the provider may request at any time to terminate the video visit and request the patient to seek care or treatment in person

## 2022-07-07 ENCOUNTER — TELEPHONE (OUTPATIENT)
Dept: PSYCHIATRY | Facility: CLINIC | Age: 47
End: 2022-07-07

## 2022-07-07 NOTE — TELEPHONE ENCOUNTER
----- Message from Shereen Park LCSW sent at 7/7/2022 10:46 AM EDT -----  Could you please check in with Kade regarding more appointments - I see her weekly    Rosaura Shipley

## 2022-07-07 NOTE — TELEPHONE ENCOUNTER
Scheduled future appointments, left message for patient to call the office back to verify the dates and times

## 2022-07-18 ENCOUNTER — SOCIAL WORK (OUTPATIENT)
Dept: BEHAVIORAL/MENTAL HEALTH CLINIC | Facility: CLINIC | Age: 47
End: 2022-07-18
Payer: COMMERCIAL

## 2022-07-18 DIAGNOSIS — F39 MOOD DISORDER (HCC): Primary | ICD-10-CM

## 2022-07-18 DIAGNOSIS — F41.1 GENERALIZED ANXIETY DISORDER: ICD-10-CM

## 2022-07-18 PROCEDURE — 90834 PSYTX W PT 45 MINUTES: CPT | Performed by: SOCIAL WORKER

## 2022-07-18 NOTE — PSYCH
Psychotherapy Provided: Individual Psychotherapy 45 minutes     Length of time in session: 45 minutes, follow up in 1 week    Encounter Diagnosis     ICD-10-CM    1  Mood disorder ([address] )  F39    2  Generalized anxiety disorder  F41 1        Goals addressed in session: Goal 1     Data:  Noris Carias presents tonight sharing that she had a great weekend  She spent it with her  and two couples, biking and tubing on the [place]  Noris Carias shares that she felt less alone and this lifted her mood  She talks at length about how alone she felt growing up with her parents and sister  Now, she wonders how she managed to become a person with interests so different from her family of origin and even her 's family  Therapist directs the session to a discussion of what is depressing Noris Carias  She reflects for some time, concluding ultimately that it's the fact that she does not quite fit in with the people common to this area  She did enjoy time spent with the two couples; therapist recommends that she seek out more friends like them  We conclude by briefly discussing core values  Assessment:  Brandie's reported mood is improved  Her affect is congruent - she presents as euthymic  She is cooperative, and her thoughts are logical and goal oriented  She denies SI/HI  Plan:  Noris Carias will return in one week  We will return to core values - and therapist will again recommend keeping a mood tracker  Pain:      mild emotional distress reported    0    Current suicide risk : Low         Behavioral Health Treatment Plan [facility]: Diagnosis and Treatment Plan explained to Lloyd Lopez relates understanding diagnosis and is agreeable to Treatment Plan   YES

## 2022-07-26 ENCOUNTER — SOCIAL WORK (OUTPATIENT)
Dept: BEHAVIORAL/MENTAL HEALTH CLINIC | Facility: CLINIC | Age: 47
End: 2022-07-26
Payer: COMMERCIAL

## 2022-07-26 DIAGNOSIS — F39 MOOD DISORDER (HCC): ICD-10-CM

## 2022-07-26 DIAGNOSIS — F41.1 GENERALIZED ANXIETY DISORDER: Primary | ICD-10-CM

## 2022-07-26 PROCEDURE — 90834 PSYTX W PT 45 MINUTES: CPT | Performed by: SOCIAL WORKER

## 2022-07-27 NOTE — PSYCH
Psychotherapy Provided: Individual Psychotherapy 45 minutes     Length of time in session: 45 minutes, follow up in 1 week     Encounter Diagnosis     ICD-10-CM    1  Generalized anxiety disorder  F41 1    2  Mood disorder ([address] )  F39        Goals addressed in session: Goal 1     Data:  Lila Guevara reports tonight that "nothing changes" and she still suffering with depression/anxiety  She states that work remains challenging, and she is growing bored with her   She also reportedly continues to drink alcohol heavily in the evenings to "slow down [her] thoughts "  Therapist asks again about a mood tracker and Lila Guevara reports that she still has not started one  Therapist again challenges her heavy reliance on alcohol and offers to connect her with resources for an evaluation  Lila Guevara firmly states that she is not interested, adding "I don't want my alcohol use on my medical resume;" she also adds that she does not want to talk any more about her alcohol use with this therapist   Therapist redirects the session to a discussion of what she can do to alleviate her depression symptoms  Lila Guevara does report that she is seeing her PCP next week for med management options  Therapist reinforces this, pointing out that if her mood is stable she is more likely to enjoy healthy relationships (e g  with her  and at work) and also may not feel compelled to consume alcohol  Lila Guevara agrees but states that she does not want to be "zoned out" on meds  Therapist focuses on her relationship with her , and Lila Guevara remarks that she does not feel understood by him  She adds that historically she has made fun of many of her interests including spirituality/paganism    Lila Guevara becomes tearful when reporting that she believes that "[she] suffers for the sake of others and that's okay "  Therapist challenges Lila Guevara to consider what she has learned from her suffering - and to explore what her belief system (pagenism - nature Religious) has to say about this  We conclude with Ken Mora agreeing to limit her use of Facebook at night and find other things (e g  interacting with , gardening, drinking) to occupy her time  She agrees  Assessment:  Brandie's reported mood is anxious/depressed  Her affect is congruent; she is tearful at times  Brandie's thoughts are logical and goal oriented  She denies SI/HI  This therapist opines that Ken Mora is suffering with a mood disorder, however, it is becoming more evident that she evidences borderline personality traits  She often references feeling "empty," and reports a frequent need to reach out to her  during the day/night  Her mood presents as unstable and she has a complicated relationship with her  - at times idealizing him and at others times harshly criticizing him  Additionally, she is dissatisfied with her appearance  Plan:  Ken Mora will follow up in one week  At that time, we will review the Core Values worksheet  Pain:      moderate to severe    0    Current suicide risk : Low         Behavioral Health Treatment Plan [facility]: Diagnosis and Treatment Plan explained to Murali Louise relates understanding diagnosis and is agreeable to Treatment Plan   YES

## 2022-08-02 ENCOUNTER — SOCIAL WORK (OUTPATIENT)
Dept: BEHAVIORAL/MENTAL HEALTH CLINIC | Facility: CLINIC | Age: 47
End: 2022-08-02
Payer: COMMERCIAL

## 2022-08-02 DIAGNOSIS — F41.1 GENERALIZED ANXIETY DISORDER: ICD-10-CM

## 2022-08-02 DIAGNOSIS — F39 MOOD DISORDER (HCC): Primary | ICD-10-CM

## 2022-08-02 PROCEDURE — 90834 PSYTX W PT 45 MINUTES: CPT | Performed by: SOCIAL WORKER

## 2022-08-03 ENCOUNTER — OFFICE VISIT (OUTPATIENT)
Dept: INTERNAL MEDICINE CLINIC | Facility: CLINIC | Age: 47
End: 2022-08-03
Payer: COMMERCIAL

## 2022-08-03 VITALS
SYSTOLIC BLOOD PRESSURE: 130 MMHG | TEMPERATURE: 98.4 F | OXYGEN SATURATION: 98 % | HEIGHT: 63 IN | HEART RATE: 65 BPM | WEIGHT: 190.5 LBS | DIASTOLIC BLOOD PRESSURE: 68 MMHG | BODY MASS INDEX: 33.75 KG/M2

## 2022-08-03 DIAGNOSIS — Z13.0 SCREENING FOR DEFICIENCY ANEMIA: ICD-10-CM

## 2022-08-03 DIAGNOSIS — Z12.31 SCREENING MAMMOGRAM FOR BREAST CANCER: Primary | ICD-10-CM

## 2022-08-03 DIAGNOSIS — R00.2 PALPITATIONS: ICD-10-CM

## 2022-08-03 DIAGNOSIS — E78.2 MIXED HYPERLIPIDEMIA: ICD-10-CM

## 2022-08-03 DIAGNOSIS — Z12.11 COLON CANCER SCREENING: ICD-10-CM

## 2022-08-03 DIAGNOSIS — F33.1 RECURRENT MAJOR DEPRESSIVE EPISODES, MODERATE (HCC): ICD-10-CM

## 2022-08-03 DIAGNOSIS — I10 PRIMARY HYPERTENSION: ICD-10-CM

## 2022-08-03 DIAGNOSIS — F41.1 GENERALIZED ANXIETY DISORDER: ICD-10-CM

## 2022-08-03 DIAGNOSIS — E55.9 VITAMIN D DEFICIENCY: ICD-10-CM

## 2022-08-03 DIAGNOSIS — Z13.29 SCREENING FOR THYROID DISORDER: ICD-10-CM

## 2022-08-03 PROCEDURE — 99214 OFFICE O/P EST MOD 30 MIN: CPT | Performed by: PHYSICIAN ASSISTANT

## 2022-08-03 RX ORDER — ARIPIPRAZOLE 2 MG/1
2 TABLET ORAL DAILY
Qty: 30 TABLET | Refills: 0 | Status: SHIPPED | OUTPATIENT
Start: 2022-08-03 | End: 2022-08-31

## 2022-08-03 NOTE — PSYCH
Psychotherapy Provided: Individual Psychotherapy 45 minutes     Length of time in session: 45 minutes, follow up in 1 week    Encounter Diagnosis     ICD-10-CM    1  Mood disorder ([address] )  F39    2  Generalized anxiety disorder  F41 1        Goals addressed in session: Goal 1     Data:  Fina Orosco reports that "things are all the same," when alluding to her unstable mood and over all frustration with her life circumstances  She shares that she is getting out every weekend with her  to enjoy time with friends  This is a plus  Nonetheless, she describes herself as "stuck" and states that she has not made the changes recommended by this therapist   She is still spending a lot of time on Facebook in the evening, for example, and she has not made any changes to her diet/exercise level  She also did not create a mood diary, but she does note that her  opines that she is moodiest during the week in the mornings  Therapist redirects Fina Orosco when she begins complaining about the medical system in the [facility], and points out that Fina Orosco that we can make small changes in our own world  Therapist focuses the remainder of the session on completing the Core Values worksheet with Fina Orosco  She identifies nearly 20 values, among which is acceptance  Therapist encourages Fina Orosco to strive to deepen her friendship with at least one of the couples with whom she and her  have been spending time recently  Assessment:  Brandie's reported mood is depressed  Her affect is congruent; she is tearful  Fina Orosco is cooperative, and seems to have put a lot of effort into her homework assignment  Her thoughts are logical and goal oriented, though she does engage in distorted ways of thinking - making generalizations, for example  Fina Orosco continues to evidence symptoms of Bipolar Disorder and possibly Borderline Personality Disorder  She seems to be very focused on getting attention from others    When this is pointed out to her, she remarks that she is pleased with herself  Plan:  Fina Orosco will follow up in 1 week  Therapist will provide Fian Orosco with a worksheet on Automatic Thinking  Pain:      moderate to severe    0    Current suicide risk : Low         Behavioral Health Treatment Plan [facility]: Diagnosis and Treatment Plan explained to Rosey Expose relates understanding diagnosis and is agreeable to Treatment Plan   YES

## 2022-08-04 PROBLEM — R00.2 PALPITATIONS: Status: ACTIVE | Noted: 2022-08-04

## 2022-08-04 NOTE — ASSESSMENT & PLAN NOTE
Continue celexa and restart abilify  Directions for use and possible side effects discussed and patient verbalized understanding of these  We discussed transitioning from celexa to cymbalta in the future if needed

## 2022-08-04 NOTE — PROGRESS NOTES
Assessment/Plan:  Problem List Items Addressed This Visit        Cardiovascular and Mediastinum    Hypertension    Relevant Orders    Comprehensive metabolic panel       Other    Hyperlipidemia    Relevant Orders    Lipid panel    Recurrent major depressive episodes, moderate (HCC)     Continue celexa and restart abilify  Directions for use and possible side effects discussed and patient verbalized understanding of these  We discussed transitioning from celexa to cymbalta in the future if needed  Relevant Medications    ARIPiprazole (ABILIFY) 2 mg tablet    Generalized anxiety disorder    Relevant Medications    ARIPiprazole (ABILIFY) 2 mg tablet    Palpitations     Update labs  Will get holter monitor  Treat according to results         Relevant Orders    AMB extended holter monitor      Other Visit Diagnoses     Screening mammogram for breast cancer    -  Primary    Relevant Orders    Mammo screening bilateral w 3d & cad    Screening for deficiency anemia        Relevant Orders    CBC and differential    Screening for thyroid disorder        Relevant Orders    TSH, 3rd generation    Vitamin D deficiency        Relevant Orders    Vitamin D 25 hydroxy    Colon cancer screening        Relevant Orders    Cologuard           Diagnoses and all orders for this visit:    Screening mammogram for breast cancer  -     Mammo screening bilateral w 3d & cad; Future    Mixed hyperlipidemia  -     Lipid panel; Future    Primary hypertension  -     Comprehensive metabolic panel; Future    Screening for deficiency anemia  -     CBC and differential; Future    Screening for thyroid disorder  -     TSH, 3rd generation; Future    Vitamin D deficiency  -     Vitamin D 25 hydroxy; Future    Colon cancer screening  -     Cologuard    Generalized anxiety disorder  -     ARIPiprazole (ABILIFY) 2 mg tablet; Take 1 tablet (2 mg total) by mouth daily    Palpitations  -     AMB extended holter monitor;  Future    Recurrent major depressive episodes, moderate (HCC)      Recurrent major depressive episodes, moderate (HCC)  Continue celexa and restart abilify  Directions for use and possible side effects discussed and patient verbalized understanding of these  We discussed transitioning from celexa to cymbalta in the future if needed  Palpitations  Update labs  Will get holter monitor  Treat according to results      Subjective:      Patient ID: Eddie Gilbert is a 41 y o  female  Pt presents for routine visit  She is due for mammogram, labs, CRC screening  She continues to follow with therapy on a weekly basis and this has been helpful  There is concern for an underlying mood disorder  Questionaire completed and pt scored positive  She is interested in trying a medication to assist with this  She presently takes celexa  She previously tried Abilify, wellbutrin, trintellix, seroquel, rexulti and vraylar  She would be agreeable to revisiting some of these, particularly abilify and cymbalta  She also notes issues with increased palpitations occurring nearly daily and this have been scary for her to experience  No chest pain  No syncope  The following portions of the patient's history were reviewed and updated as appropriate:   She has a past medical history of Anxiety, Candidiasis of mouth, Depression, Hypertension, Memory loss (4/7/2021), Obesity, Sciatica, Skin rash, and Urticaria ,  does not have any pertinent problems on file  ,   has a past surgical history that includes Dental surgery  ,  family history includes Alcohol abuse in her paternal grandfather; Cancer in her family and father; Depression in her mother; Diabetes in her family; Heart disease in her family; Hypertension in her father; Mental illness in her paternal grandmother; Osteoporosis in her mother; Stroke in her family; Testicular cancer in her father  ,   reports that she has never smoked   She has never used smokeless tobacco  She reports current alcohol use of about 9 0 - 12 0 standard drinks of alcohol per week  She reports that she does not use drugs  ,  is allergic to pollen extract     Current Outpatient Medications   Medication Sig Dispense Refill    ALPRAZolam (XANAX) 0 5 mg tablet Take 1 tablet (0 5 mg total) by mouth 3 (three) times a day as needed (as neded) 90 tablet 0    ARIPiprazole (ABILIFY) 2 mg tablet Take 1 tablet (2 mg total) by mouth daily 30 tablet 0    citalopram (CeleXA) 40 mg tablet Take 1 tablet (40 mg total) by mouth daily 90 tablet 1    ibuprofen (MOTRIN) 200 mg tablet Take by mouth every 6 (six) hours as needed for mild pain      omeprazole (PriLOSEC OTC) 20 MG tablet Take 20 mg by mouth daily      VITAMIN D PO Take 1,000 Units by mouth daily       No current facility-administered medications for this visit  Review of Systems   Constitutional: Negative for chills and fever  HENT: Negative for congestion, ear pain, hearing loss, postnasal drip, rhinorrhea, sinus pressure, sinus pain, sore throat and trouble swallowing  Eyes: Negative for pain and visual disturbance  Respiratory: Negative for cough, chest tightness, shortness of breath and wheezing  Cardiovascular: Negative  Negative for chest pain, palpitations and leg swelling  Gastrointestinal: Negative for abdominal pain, blood in stool, constipation, diarrhea, nausea and vomiting  Endocrine: Negative for cold intolerance, heat intolerance, polydipsia, polyphagia and polyuria  Genitourinary: Negative for difficulty urinating, dysuria, flank pain and urgency  Musculoskeletal: Negative for arthralgias, back pain, gait problem and myalgias  Skin: Negative for rash  Allergic/Immunologic: Negative  Neurological: Negative for dizziness, weakness, light-headedness and headaches  Hematological: Negative  Psychiatric/Behavioral: Positive for dysphoric mood  Negative for behavioral problems and sleep disturbance  The patient is not nervous/anxious  PHQ-2/9 Depression Screening            Objective:  Vitals:    08/03/22 1457   BP: 130/68   BP Location: Left arm   Patient Position: Sitting   Pulse: 65   Temp: 98 4 °F (36 9 °C)   SpO2: 98%   Weight: 86 4 kg (190 lb 8 oz)   Height: 5' 3" (1 6 m)     Body mass index is 33 75 kg/m²  Physical Exam  Constitutional:       General: She is not in acute distress  Appearance: She is well-developed  She is not diaphoretic  HENT:      Head: Normocephalic and atraumatic  Right Ear: External ear normal       Left Ear: External ear normal       Nose: Nose normal       Mouth/Throat:      Pharynx: No oropharyngeal exudate  Eyes:      General: No scleral icterus  Right eye: No discharge  Left eye: No discharge  Conjunctiva/sclera: Conjunctivae normal       Pupils: Pupils are equal, round, and reactive to light  Neck:      Thyroid: No thyromegaly  Cardiovascular:      Rate and Rhythm: Normal rate and regular rhythm  Heart sounds: Normal heart sounds  No murmur heard  No friction rub  No gallop  Pulmonary:      Effort: Pulmonary effort is normal  No respiratory distress  Breath sounds: Normal breath sounds  No wheezing or rales  Abdominal:      General: Bowel sounds are normal  There is no distension  Palpations: Abdomen is soft  Tenderness: There is no abdominal tenderness  Musculoskeletal:         General: No tenderness or deformity  Normal range of motion  Cervical back: Normal range of motion and neck supple  Skin:     General: Skin is warm and dry  Neurological:      Mental Status: She is alert and oriented to person, place, and time  Cranial Nerves: No cranial nerve deficit  Psychiatric:         Mood and Affect: Mood is depressed  Behavior: Behavior normal          Thought Content:  Thought content normal          Judgment: Judgment normal

## 2022-08-09 ENCOUNTER — SOCIAL WORK (OUTPATIENT)
Dept: BEHAVIORAL/MENTAL HEALTH CLINIC | Facility: CLINIC | Age: 47
End: 2022-08-09
Payer: COMMERCIAL

## 2022-08-09 DIAGNOSIS — F41.1 GENERALIZED ANXIETY DISORDER: ICD-10-CM

## 2022-08-09 DIAGNOSIS — F39 MOOD DISORDER (HCC): Primary | ICD-10-CM

## 2022-08-09 PROCEDURE — 90847 FAMILY PSYTX W/PT 50 MIN: CPT | Performed by: SOCIAL WORKER

## 2022-08-09 NOTE — BH TREATMENT PLAN
Wilman Ventura  1975       Date of Initial Treatment Plan: 4/16/21   Date of Current Treatment Plan: 08/09/22    Treatment Plan Number 4    Karyna Martines feels that she has made some improvements over the last treatment period  Feeling strongly that her marriage is important  Also recognizes that her life is valuable and she is deserving of happiness  Making some efforts to make new friends  Strengths/Personal Resources for Self Care: empathic, goal oriented, supportive spouse    Diagnosis:   1  Mood disorder ([address] )     2  Generalized anxiety disorder         Area of Needs: Depression      Long Term Goal 1: "I would like to keep making strides toward feeling more positive about life "    Target Date: 1/9/23  Completion Date:  To be determined by therapist and Jeniffer Barley Term Objectives for Goal 1:           1  Jaiden Campbell will come to therapy and process thoughts and feelings regarding current stressors                                                                2  Jaiden Campbell will continue to examine her thoughts - and work to replace thoughts that are supporting the presence of symptoms (depression and anxiety) - especially her tendency to think in black & whites and make generalizations                                                              3  Jaiden Campbell will identify goals for self care, and take small, reasonable steps to achieve them                                                              4  Jaiden Campbell will approach her  more empathically and strive to make more time to spend with him                                                              5  Jaiden Campbell will continue to participate in med management and follow recommendations    Responsible:  Karyna Martines and therapist    GOAL 1: Modality: Individual/weekly/client centered, CBT, mindfulness          [address]: Diagnosis and Treatment Plan explained to Genaro Moore relates understanding diagnosis and is agreeable to Treatment Plan         Client Comments : Please share your thoughts, feelings, need and/or experiences regarding your treatment plan: NA

## 2022-08-09 NOTE — PSYCH
Psychotherapy Provided: Family Therapy     Length of time in session: 50 minutes, follow up in 1 week    Encounter Diagnosis     ICD-10-CM    1  Mood disorder ([address] )  F39    2  Generalized anxiety disorder  F41 1        Goals addressed in session: Goal 1     Data:  Juan David Mcgill presents tonight with her  Brandi Santos  Therapist asks him to comment on what he notices with regard to Brandie's symptoms of depression  He shares that she is generally tired all the time and hunkers down on the sofa each night  He is not happy about this  Therapist provides psycho-ed re: Steps to take to defeat depression - in particular, behavioral activation  Juan David Mcgill states that she has no time to attend to her and Tony's needs  Therapist assists her to consider if she can work "smarter" during the day, so she does not have so much charting to do at night  Cyndy Márquez that he finds the week nights boring  Juan David Mcgill again agrees to begin limiting time on Facebook and watching the news - since she admits that she finds it depressing  She also agrees to start making an effort to connect with a friend, Ayo Jimenez, who she considers "a good soul "  We conclude by updating her treatment plan  Assessment:  Brandie's reported mood is depressed  Her affect is congruent; she is somewhat tearful  Her thoughts are logical and goal oriented but she engages in distorted ways of thinking  She seems receptive when therapist points this out  Juan David Mcgill denies SI/HI  Plan:  Juan David Mcgill will follow up in one week  She shares tonight that she did start Abilify today and she already notices side effects - including feeling tired - "like [she's] drugged" but agrees to try for at least two weeks when she see her PCP again        Pain:      mild emotional distress reported    0    Current suicide risk : Low         Behavioral Health Treatment Plan [facility]: Diagnosis and Treatment Plan explained to Lauren Banks relates understanding diagnosis and is agreeable to Treatment Plan   YES

## 2022-08-19 ENCOUNTER — SOCIAL WORK (OUTPATIENT)
Dept: BEHAVIORAL/MENTAL HEALTH CLINIC | Facility: CLINIC | Age: 47
End: 2022-08-19
Payer: COMMERCIAL

## 2022-08-19 DIAGNOSIS — F41.1 GENERALIZED ANXIETY DISORDER: ICD-10-CM

## 2022-08-19 DIAGNOSIS — F39 MOOD DISORDER (HCC): Primary | ICD-10-CM

## 2022-08-19 PROCEDURE — 90834 PSYTX W PT 45 MINUTES: CPT | Performed by: SOCIAL WORKER

## 2022-08-19 NOTE — PSYCH
Psychotherapy Provided: Individual Psychotherapy 45 minutes     Length of time in session: 45 minutes, follow up in 1 week    Encounter Diagnosis     ICD-10-CM    1  Mood disorder ([address] )  F39    2  Generalized anxiety disorder  F41 1        Goals addressed in session: Goal 1     Data:  Estephania Davey reports today that she is feeling much better on the Abilify but adds that she thinks "[her] mood is now too good "  She states that she has lots of energy and has been getting a lot more things done around the house  She is having enjoyable dreams and in general feels more expansive, ie  Wanting to live life to the fullest   She wonders about being manic, as she notes that a patient commented about her talking a lot  Therapist focuses the session on this, and notes that Estephania Davey is speaking a lot but points out that it is not negative as is generally the case in the past   It is also not irrational   With thought, Estephania Davey does confide that she has been told in the past that she always talks a lot - so she rules out that it is the medicine  Estephania Davey states that she has no intention of stopping it, but just is concerned that others will think she's "crazy "  Therapist points out that other people cannot read her mind; they do not know that she is experiencing an increase and change in the nature of her thoughts  Therapist provides psycho-ed re: hypormania and steven, and we review when to go to the ER  Estephania Davey firmly states that she has no thoughts of SI/HI  She happily adds that she is making an effort to make new friends and she finds this enjoyable  She is going to [employer] this weekend  Assessment:  Brandie's reported mood is "happy "  Her affect is bright and cheery  She is cooperative, and her thoughts are well organized, logical, goal oriented  The Abilify seems to have had an activating effect on Estephania Davey; she has been lifted out of her depression        Plan:  Estephania Davey will give consideration to consulting a psychiatrist at this office  Otherwise she will follow up with her PCP  Pain:      none    0    Current suicide risk : Low         Behavioral Health Treatment Plan [facility]: Diagnosis and Treatment Plan explained to Arik Becerril relates understanding diagnosis and is agreeable to Treatment Plan   YES

## 2022-08-23 ENCOUNTER — SOCIAL WORK (OUTPATIENT)
Dept: BEHAVIORAL/MENTAL HEALTH CLINIC | Facility: CLINIC | Age: 47
End: 2022-08-23
Payer: COMMERCIAL

## 2022-08-23 DIAGNOSIS — F39 MOOD DISORDER (HCC): Primary | ICD-10-CM

## 2022-08-23 DIAGNOSIS — F41.1 GENERALIZED ANXIETY DISORDER: ICD-10-CM

## 2022-08-23 PROCEDURE — 90834 PSYTX W PT 45 MINUTES: CPT | Performed by: SOCIAL WORKER

## 2022-08-24 NOTE — PSYCH
Psychotherapy Provided: Individual Psychotherapy 40 minutes     Length of time in session: 40 minutes, follow up in 1 week    Encounter Diagnosis     ICD-10-CM    1  Mood disorder ([address] )  F39    2  Generalized anxiety disorder  F41 1        Goals addressed in session: Goal 1     Data:  Raheel Gonzalez reports that she continues to feel better on the Abilify  She notes that her mood is lifted; she is more focused and doing more around the house  Also, she reports drinking less alcohol but she still "fears" racing thoughts and not being able to sleep at night  For this reason, she drinks several glasses of wine each night  She acknowledges that this is a problem, as she is self medicating  She states that she is embarrassed to report her use of alcohol - and asks this therapist to not chart what she is doing  Therapist discusses responsibility to produce an accurate medical record; Raheel Gonzalez states that she understands  Therapist focuses the rest of the session on pros of taking the Abilify and possibly consulting with a psychiatric provider  Raheel Gonzalez reveals that she is thinking of taking another graduate level course; 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

## 2022-08-30 ENCOUNTER — SOCIAL WORK (OUTPATIENT)
Dept: BEHAVIORAL/MENTAL HEALTH CLINIC | Facility: CLINIC | Age: 47
End: 2022-08-30
Payer: COMMERCIAL

## 2022-08-30 DIAGNOSIS — F41.1 GENERALIZED ANXIETY DISORDER: ICD-10-CM

## 2022-08-30 DIAGNOSIS — F39 MOOD DISORDER (HCC): Primary | ICD-10-CM

## 2022-08-30 PROCEDURE — 90834 PSYTX W PT 45 MINUTES: CPT | Performed by: SOCIAL WORKER

## 2022-08-31 DIAGNOSIS — F41.1 GENERALIZED ANXIETY DISORDER: ICD-10-CM

## 2022-08-31 RX ORDER — ARIPIPRAZOLE 2 MG/1
TABLET ORAL
Qty: 30 TABLET | Refills: 0 | Status: SHIPPED | OUTPATIENT
Start: 2022-08-31 | End: 2022-10-03

## 2022-08-31 NOTE — PSYCH
Psychotherapy Provided: Individual Psychotherapy 45 minutes     Length of time in session: 45 minutes, follow up in 1 week    Encounter Diagnosis     ICD-10-CM    1  Mood disorder ([address] )  F39    2  Generalized anxiety disorder  F41 1        Goals addressed in session: Goal 1     Data: Leta Garcia continues to report doing better on the Abilify  She states that her thoughts are less negative and she feels more energized  She has been getting up an hour earlier in the morning and getting household chores done  Her  Neftali Lyles recognizes this and has been praising her  Leta Garcia has also started another graduate class  For this reason, she states that she is drinking less alcohol at night  She states that her grades are really important to her and she cannot be "groggy" when doing course work  She now admits that she has an emotional addiction to alcohol; while she can manipulate how much she drinks/when and under what circumstances, she does crave the substance  Again, using MI techniques, therapist discusses getting treatment with Leta Garcia  The focus on our session becomes past emotional traumas she suffered  She comments that her parents were never especially supportive and for this reason she opines that she engaged in attention seeking behaviors  This included cutting at times  She regrets this behavior now, but wonders how she will ever get past the damage that was done  Therapist provides psycho-ed re:  EMDR and assigns Leta Garcia the task of reviewing the [facility] website for additional information and consideration  Assessment:  Brandie's reported mood is euthymic  She is cooperative and displays a noticeably more positive attitude  Her affect is bright  Her thoughts are logical and goal oriented  She denies SI/HI  Plan:  Leta Garcia will follow up in 1 week  At that time we will review her homework      Pain:      mild emotional distress reported    0    Current suicide risk : [address] Treatment Plan [facility]: Diagnosis and Treatment Plan explained to Jaqui Clemons relates understanding diagnosis and is agreeable to Treatment Plan   YES

## 2022-09-20 ENCOUNTER — SOCIAL WORK (OUTPATIENT)
Dept: BEHAVIORAL/MENTAL HEALTH CLINIC | Facility: CLINIC | Age: 47
End: 2022-09-20
Payer: COMMERCIAL

## 2022-09-20 DIAGNOSIS — F39 MOOD DISORDER (HCC): Primary | ICD-10-CM

## 2022-09-20 DIAGNOSIS — F41.1 GENERALIZED ANXIETY DISORDER: ICD-10-CM

## 2022-09-20 PROCEDURE — 90834 PSYTX W PT 45 MINUTES: CPT | Performed by: SOCIAL WORKER

## 2022-09-21 NOTE — PSYCH
Psychotherapy Provided: Individual Psychotherapy 45 minutes     Length of time in session: 45 minutes, follow up in 1 week    Encounter Diagnosis     ICD-10-CM    1  Mood disorder ([address] )  F39    2  Generalized anxiety disorder  F41 1        Goals addressed in session: Goal 1     Data:  Kamille Saunders has not been seen by this therapist for approximately two weeks  She reports that she has been busy with school; she is now taking another graduate class  She is anxious about an upcoming test, and wonders if she has experienced a cognitive decline as she insists that her memory is not what it used to me  Therapist reassures her that she has a lot on her mind with her job, etc and suggests that she wait to see how she does on her upcoming test before jumping to the conclusion that she has a problem with her memory  Kamille Saunders does report that she continues to do well on the Abilify  She reports improved mood over all - minimal depression, less moodiness/irritablity, and more motivation to get things done around the house  She is getting up earlier and has followed therapist's recommendation that she spend less time on Facebook  For this reason, she has more time to get her charting done and in turn she spends more time with her   Kamille Saunders does report, however, that she continues to consume 2-3 glasses of wine per night  She remarks that she fears not being able to sleep and she is convinced that it slows her racing thoughts  Therapist challenges this conviction, and once again strongly encourages her to see a psychiatric provider at this office for medication management  Assessment:  Brandie's reported mood is euthymic  Her affect is congruent  She is cooperative and maintains good eye contact  Her thoughts are logical and goal oriented  She denies SI/HI  Kamille Saunders mentions again that she is doing well but wishes to continue in therapy    She comments that she needs the accountability - and would like to work on drinking less alcohol  Plan:  Steve Friedman will follow up in 1 week  Pain:      Mild emotional distress reported    0    Current suicide risk : Low         Behavioral Health Treatment Plan [facility]: Diagnosis and Treatment Plan explained to Alo Harris relates understanding diagnosis and is agreeable to Treatment Plan   YES

## 2022-09-27 ENCOUNTER — SOCIAL WORK (OUTPATIENT)
Dept: BEHAVIORAL/MENTAL HEALTH CLINIC | Facility: CLINIC | Age: 47
End: 2022-09-27
Payer: COMMERCIAL

## 2022-09-27 DIAGNOSIS — F41.1 GENERALIZED ANXIETY DISORDER: ICD-10-CM

## 2022-09-27 DIAGNOSIS — F39 MOOD DISORDER (HCC): Primary | ICD-10-CM

## 2022-09-27 PROCEDURE — 90832 PSYTX W PT 30 MINUTES: CPT | Performed by: SOCIAL WORKER

## 2022-10-02 DIAGNOSIS — F41.1 GENERALIZED ANXIETY DISORDER: ICD-10-CM

## 2022-10-03 RX ORDER — ARIPIPRAZOLE 2 MG/1
TABLET ORAL
Qty: 30 TABLET | Refills: 0 | Status: SHIPPED | OUTPATIENT
Start: 2022-10-03

## 2022-10-12 PROBLEM — J01.10 ACUTE NON-RECURRENT FRONTAL SINUSITIS: Status: RESOLVED | Noted: 2021-06-25 | Resolved: 2022-10-12

## 2022-10-31 ENCOUNTER — SOCIAL WORK (OUTPATIENT)
Dept: BEHAVIORAL/MENTAL HEALTH CLINIC | Facility: CLINIC | Age: 47
End: 2022-10-31

## 2022-10-31 DIAGNOSIS — F39 MOOD DISORDER (HCC): Primary | ICD-10-CM

## 2022-10-31 DIAGNOSIS — F41.1 GENERALIZED ANXIETY DISORDER: ICD-10-CM

## 2022-11-01 NOTE — PSYCH
Psychotherapy Provided: Individual Psychotherapy 45 minutes     Length of time in session: 45 minutes, follow up in 2 weeks    Encounter Diagnosis     ICD-10-CM    1  Mood disorder ([address] )  F39    2  Generalized anxiety disorder  F41 1        Goals addressed in session: Goal 1     Data:  Celia Chaudhry reports tonight that she is doing well over all but feels somewhat depressed tonight about her job  She scores a 9 on the PHQ9 and a 12 on the GAD7  She acknowledges that this is much improved from the past and attributes this to the addition of Abilify  Celia Chaudhry finds her work to be rewarding but notes that she is often charting into the night  This takes away from the time that she has to devote to school work; 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  Celia Chaudhry does throw her hands up (literally) tonight and remark that she feels like quitting everything - even finding a new job - as she just feels overwhelmed  Therapist points out to her that she also reports that she has been occupied the past two weekends camping with her 's family; clearly her time has been especially limited due to this fact  Celia Chaudhry agrees but wonders how she can manage it all, as her 's expectation is that she spend time with him/his family in addition to all her other responsibilities  Therapist confronts Celia Chaudhry with this fact as suggests that possibly she work Part-Time while attending school  Celia Chaudhry claims that she can't afford to do this; "I like my lifestyle as it is "  Therapist again emphasizes that sacrifices may have to be made  We conclude by discussing her difficult relationship with her 's family; she notes that they are not nice people and she often feels judged by them  Therapist suggests that she negotiate time limits with them with her     Celia Chaudhry states that this is not likely to occur; "He is set in his way and is committed to his family "  When asked about alcohol consumption, Rambo Hill reports that she is drinking 2-3 glasses of wine each night  Again, therapist urges her to cut back and recommends that she consult a provider about her difficulty sleeping  Rambo Hill states that she considers it but "[she] does not want to become reliant on something else to help [her] sleep "    Assessment:  Brandie's reported mood is somewhat depressed  Her affect is congruent  She is cooperative, and her thoughts are logical and goal oriented  She denies SI/HI  Rambo Hill admits that she has a problem with alcohol but it seems from her point of view there have been no negative consequences and thus she has no reason to change her behavior  She firmly denies that she drinks before work or during her shifts  Plan:  Rambo Hill will follow up in 2 weeks  Therapist will return to a discussion of work/life balance  Pain:      moderate emotional distress reported    0    Current suicide risk : Low         Behavioral Health Treatment Plan [facility]: Diagnosis and Treatment Plan explained to Fayne Apley relates understanding diagnosis and is agreeable to Treatment Plan   YES    Visit start and stop times:    11/01/22  Start Time: 1645  Stop Time: 7973  Total Visit Time: 45 minutes

## 2022-11-15 ENCOUNTER — SOCIAL WORK (OUTPATIENT)
Dept: BEHAVIORAL/MENTAL HEALTH CLINIC | Facility: CLINIC | Age: 47
End: 2022-11-15

## 2022-11-15 DIAGNOSIS — F41.1 GENERALIZED ANXIETY DISORDER: ICD-10-CM

## 2022-11-15 DIAGNOSIS — F39 MOOD DISORDER (HCC): Primary | ICD-10-CM

## 2022-11-15 NOTE — PSYCH
Psychotherapy Provided: Individual Psychotherapy 48 minutes     Length of time in session: 48 minutes, follow up in 2 weeks    Encounter Diagnosis     ICD-10-CM    1  Mood disorder ([address] )  F39    2  Generalized anxiety disorder  F41 1        Goals addressed in session: Goal 1     Data:  Noris Carias reports tonight that she is not doing well; she is tearful  She recounts an exchange with her father in law in the hospital cafeteria this afternoon  She felt that he was dismissive - as usual   For this reason, she feels terrible; therapist encourages her to not dwell on his negativity  Noris Carias finds herself in a bind however, as her  is very close to his father; she does not wish to force him to make a choice between the two of them  Noris Carias is also overwhelmed with school and considers quitting  Therapist works with Noris Carias to weigh the pros/cons  She decides by the end of the session that she does wish to quit - even though others may  her harshly, possibly as "a loser "  We conclude by discussing Brandie's belief that the purpose of her life is to suffer  Therapist challenges this thinking, pointing out that her suffering can not be in vain  Her purpose must be beneficial to others  Noris Carias states that she will give that some thought  Assessment:  Brandie's reported mood is depressed  Her affect is congruent; she is tearful  Her thoughts are logical and goal oriented  She denies SI/HI  She reports that she did not drink alcohol last night, and plans to get more "control" once again over her health  Plan:  Noris Carias will follow up in 2 weeks  Pain:      moderate to severe    0    Current suicide risk : Low         Behavioral Health Treatment Plan [facility]: Diagnosis and Treatment Plan explained to Lloyd Lopez relates understanding diagnosis and is agreeable to Treatment Plan   YES    Visit start and stop times:    11/15/22  Start Time: 1550  Stop Time: 5850  Total Visit Time: 48 minutes

## 2022-11-18 DIAGNOSIS — F41.1 GENERALIZED ANXIETY DISORDER: ICD-10-CM

## 2022-11-18 RX ORDER — ARIPIPRAZOLE 2 MG/1
TABLET ORAL
Qty: 30 TABLET | Refills: 0 | Status: SHIPPED | OUTPATIENT
Start: 2022-11-18

## 2022-11-29 ENCOUNTER — SOCIAL WORK (OUTPATIENT)
Dept: BEHAVIORAL/MENTAL HEALTH CLINIC | Facility: CLINIC | Age: 47
End: 2022-11-29

## 2022-11-29 DIAGNOSIS — F39 MOOD DISORDER (HCC): Primary | ICD-10-CM

## 2022-11-29 DIAGNOSIS — F41.1 GENERALIZED ANXIETY DISORDER: ICD-10-CM

## 2022-11-30 NOTE — PSYCH
Psychotherapy Provided: Individual Psychotherapy 41 minutes     Length of time in session: 41 minutes, follow up in 1 week    Encounter Diagnosis     ICD-10-CM    1  Mood disorder ([address] )  F39       2  Generalized anxiety disorder  F41 1           Goals addressed in session: Goal 1     Data:  Saranya Galeana presents tonight, commenting "I'm a mess "  She states that she is depressed, anxious and has been experiencing fleeting thoughts of suicide  She explains that she stopped taking the Abilify about three weeks ago and now she (and her ) recognize that she is going backwards  She states that she plans to start taking it again, but had stopped because she thought it was making her anxious  Therapist again urges Saranya Galeana to work with a provider regarding her apparent mood disorder  She refuses stating that she is not ready to do so - again because she does not want to talk about her alcohol abuse  Therapist points out that she seems fairly "stuck" as her complaints remain the same - She hates her job; she finds her class too stressful; and she is generally overwhelmed with life/attending to home responsibilities, etc   Saranya Galeana agrees but states that she is not ready to change anything about her life  Therapist suggests that we work on skills for managing overwhelming emotions, as it seems that Saranya Galeana is less likely to make changes when feeling overwhelmed  We begin reviewing Chapter One of the DBT Skills workbook  Saranya Galeana confides that she tends to be impulsive  Therapist provides her with the text 65 and the crisis numbers explaining that these can be called if experiencing SI  Saranya Galeana insists that she has no plan or intent to follow through with her fleeting thoughts of suicide  A protective factor is her  Gustavo Cole  Assessment:  Brandie's reported mood is anxious/depressed  She explains that she stopped taking Abilify because she thought that it was suddenly making her anxious    Her affect is congruent; she is tearful  Brandie's thoughts are logical and goal oriented  She is experiencing fleeting thoughts of suicide  She denies HI  Therapist suspects Borderline Personality in addition to Bipolar  Often reports emptiness or overwhelming emotions; seems to exaggerate and verbalizes thoughts of harming self  Plan:  William Slade will follow up in one week  Proceed with DBT Skills Workbook - also review motivation for therapy  Pain:      moderate to severe    0    Current suicide risk : Low         Behavioral Health Treatment Plan [facility]: Diagnosis and Treatment Plan explained to Brandie Haris relates understanding diagnosis and is agreeable to Treatment Plan   YES    Visit start and stop times:    11/30/22  Start Time: 1800  Stop Time: 1841  Total Visit Time: 41 minutes

## 2022-12-20 ENCOUNTER — SOCIAL WORK (OUTPATIENT)
Dept: BEHAVIORAL/MENTAL HEALTH CLINIC | Facility: CLINIC | Age: 47
End: 2022-12-20

## 2022-12-20 DIAGNOSIS — F41.1 GENERALIZED ANXIETY DISORDER: ICD-10-CM

## 2022-12-20 DIAGNOSIS — F39 MOOD DISORDER (HCC): Primary | ICD-10-CM

## 2022-12-21 NOTE — PSYCH
Psychotherapy Provided: Individual Psychotherapy 43 minutes     Length of time in session: 43 minutes, follow up in 2 weeks    Encounter Diagnosis     ICD-10-CM    1  Mood disorder ([address] )  F39       2  Generalized anxiety disorder  F41 1           Goals addressed in session: Goal 1     Data:  Segundo Medley reports that she is again taking Abilify; nonetheless, she is depressed  Identifies her job, and therapist assists her to process associated thoughts/feeligns  In short, feels overworked, and has now applied for a new position in a local [facility] [employer]  Segundo Medley reports that she has ruminating thoughts; she is focused on all the reasons why she will not get the job  These has left her feeling hopeless about the future  Therapist reminds her that all things are temporary and encourages her to think positively  Segundo Medley reports that she is taking steps to better care for herself; she has decided not to take any further classes  She is trying to get into an exercise routine, but admits that she still abuses alcohol in the evenings - 3-4 glasses of wine  Segundo Medley admits that this is a habit that she wishes to break, but again refuses a referral for an evaluation  Therapist suggests again that she see a provider for medication management  We conclude the session by briefly reviewing REST from the DBT Skills manual     Assessment:  Brandie's reported mood is depressed, and her affect is congruent  She is tearful  Her thoughts are logical and goal oriented, and she denies SI/HI  Segundo Medley admits tonight that she has a "problem" with alcohol  Nonetheless, she does not want "alcoholism" listed on her "medical resume "  She also admits that binge eating is an issue  Therapist points out that eating disorders are not her speciality and offers to connect her to the appropriate provider  Segundo Medley refuses  Plan:  Segundo Medley will follow up in 2 weeks      Pain:      moderate to severe    0    Current suicide risk : [address] [facility] Treatment Plan [facility]: Diagnosis and Treatment Plan explained to Diana Pate relates understanding diagnosis and is agreeable to Treatment Plan   YES    Visit start and stop times:    12/20/22  Start Time: 1645  Stop Time: 1728  Total Visit Time: 43 minutes

## 2023-01-03 ENCOUNTER — SOCIAL WORK (OUTPATIENT)
Dept: BEHAVIORAL/MENTAL HEALTH CLINIC | Facility: CLINIC | Age: 48
End: 2023-01-03

## 2023-01-03 DIAGNOSIS — F41.1 GENERALIZED ANXIETY DISORDER: ICD-10-CM

## 2023-01-03 DIAGNOSIS — F39 MOOD DISORDER (HCC): Primary | ICD-10-CM
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
## 2023-01-06 DIAGNOSIS — F33.41 RECURRENT MAJOR DEPRESSIVE DISORDER, IN PARTIAL REMISSION (HCC): ICD-10-CM

## 2023-01-06 RX ORDER — ALPRAZOLAM 0.5 MG/1
0.5 TABLET ORAL 3 TIMES DAILY PRN
Qty: 90 TABLET | Refills: 0 | Status: SHIPPED | OUTPATIENT
Start: 2023-01-06

## 2023-01-07 DIAGNOSIS — F41.1 GENERALIZED ANXIETY DISORDER: ICD-10-CM

## 2023-01-08 RX ORDER — ARIPIPRAZOLE 2 MG/1
TABLET ORAL
Qty: 30 TABLET | Refills: 0 | Status: SHIPPED | OUTPATIENT
Start: 2023-01-08

## 2023-01-25 ENCOUNTER — TELEMEDICINE (OUTPATIENT)
Dept: INTERNAL MEDICINE CLINIC | Facility: CLINIC | Age: 48
End: 2023-01-25

## 2023-01-25 VITALS — BODY MASS INDEX: 33.75 KG/M2 | HEIGHT: 63 IN

## 2023-01-25 DIAGNOSIS — J01.00 ACUTE NON-RECURRENT MAXILLARY SINUSITIS: Primary | ICD-10-CM

## 2023-01-25 RX ORDER — FLUCONAZOLE 150 MG/1
150 TABLET ORAL ONCE
Qty: 1 TABLET | Refills: 0 | Status: SHIPPED | OUTPATIENT
Start: 2023-01-25 | End: 2023-01-25

## 2023-01-25 RX ORDER — AZITHROMYCIN 250 MG/1
TABLET, FILM COATED ORAL
Qty: 6 TABLET | Refills: 0 | Status: SHIPPED | OUTPATIENT
Start: 2023-01-25 | End: 2023-01-30

## 2023-01-25 NOTE — PROGRESS NOTES
Virtual Regular Visit    Verification of patient location:    Patient is located in the following state in which I hold an active license [place]      Assessment/Plan:    Problem List Items Addressed This Visit        Respiratory    Acute non-recurrent maxillary sinusitis - Primary     Start zithromax  Directions for use and possible side effects discussed and patient verbalized understanding of these  The patient was instructed to change their toothbrush to avoid reinfection  Relevant Medications    azithromycin (Zithromax) 250 mg tablet    fluconazole (DIFLUCAN) 150 mg tablet       BMI Counseling: Body mass index is 33 75 kg/m²  The BMI is above normal  Nutrition recommendations include decreasing portion sizes, consuming healthier snacks and limiting drinks that contain sugar  Rationale for BMI follow-up plan is due to patient being overweight or obese  Reason for visit is   Chief Complaint   Patient presents with   • Virtual Regular Visit     Symptoms started Sunday night, nasal congestion,headache, sneezing, SOB, body aches  Pt covid tested yesterday neg(-) but says that home tests don't work on her  Encounter provider Kelly Weems PA-C    Provider located at [address] [place] [id number]      Recent Visits  No visits were found meeting these conditions  Showing recent visits within past 7 days and meeting all other requirements  Today's Visits  Date Type Provider Dept   01/25/23 Telemedicine JUAN JOSE Jiménez Pg today's visits and meeting all other requirements  Future Appointments  No visits were found meeting these conditions  Showing future appointments within next 150 days and meeting all other requirements       The patient was identified by name and date of birth   Alverto Angeles was informed that this is a telemedicine visit and that the visit is being conducted through the [address] [place] [address] Embedded platform  She agrees to proceed     My office door was closed  No one else was in the room  She acknowledged consent and understanding of privacy and security of the video platform  The patient has agreed to participate and understands they can discontinue the visit at any time  Patient is aware this is a billable service  Subjective  Guille Ocampo is a 41 y o  female    Sinusitis  This is a new problem  The current episode started in the past 7 days  The problem is unchanged  There has been no fever  Her pain is at a severity of 5/10  The pain is moderate  Associated symptoms include chills, congestion, ear pain, headaches, sinus pressure and a sore throat  Pertinent negatives include no coughing or shortness of breath  Past treatments include nothing  The treatment provided no relief  Past Medical History:   Diagnosis Date   • Anxiety    • Candidiasis of mouth     Last Assessed 21Apr2014   • Depression    • Hypertension    • Memory loss 4/7/2021   • Obesity    • Sciatica     Last Assessed 04Aug2014   • Skin rash     Last Assessed [id number]   • Urticaria     Last Assessed 21Mar2014       Past Surgical History:   Procedure Laterality Date   • DENTAL SURGERY         Current Outpatient Medications   Medication Sig Dispense Refill   • ALPRAZolam (XANAX) 0 5 mg tablet Take 1 tablet (0 5 mg total) by mouth 3 (three) times a day as needed (as neded) 90 tablet 0   • ARIPiprazole (ABILIFY) 2 mg tablet take 1 tablet by mouth once daily 30 tablet 0   • azithromycin (Zithromax) 250 mg tablet Take 2 tablets (500 mg total) by mouth daily for 1 day, THEN 1 tablet (250 mg total) daily for 4 days   6 tablet 0   • citalopram (CeleXA) 40 mg tablet Take 1 tablet (40 mg total) by mouth daily 90 tablet 1   • fluconazole (DIFLUCAN) 150 mg tablet Take 1 tablet (150 mg total) by mouth once for 1 dose 1 tablet 0   • ibuprofen (MOTRIN) 200 mg tablet Take by mouth every 6 (six) hours as needed for mild pain     • omeprazole (PriLOSEC OTC) 20 MG tablet Take 20 mg by mouth daily     • VITAMIN D PO Take 1,000 Units by mouth daily       No current facility-administered medications for this visit  Allergies   Allergen Reactions   • Pollen Extract Allergic Rhinitis       Review of Systems   Constitutional: Positive for chills  Negative for fever  HENT: Positive for congestion, ear pain, sinus pressure and sore throat  Negative for hearing loss, postnasal drip, rhinorrhea, sinus pain and trouble swallowing  Eyes: Negative for pain and visual disturbance  Respiratory: Negative for cough, chest tightness, shortness of breath and wheezing  Cardiovascular: Negative  Negative for chest pain, palpitations and leg swelling  Gastrointestinal: Negative for abdominal pain, blood in stool, constipation, diarrhea, nausea and vomiting  Endocrine: Negative for cold intolerance, heat intolerance, polydipsia, polyphagia and polyuria  Genitourinary: Negative for difficulty urinating, dysuria, flank pain and urgency  Musculoskeletal: Negative for arthralgias, back pain, gait problem and myalgias  Skin: Negative for rash  Allergic/Immunologic: Negative  Neurological: Positive for headaches  Negative for dizziness, weakness and light-headedness  Hematological: Negative  Psychiatric/Behavioral: Negative for behavioral problems, dysphoric mood and sleep disturbance  The patient is not nervous/anxious  Video Exam    Vitals:    01/25/23 1408   Height: 5' 3" (1 6 m)       Physical Exam  Nursing note reviewed  Constitutional:       Appearance: She is well-developed  HENT:      Head: Normocephalic and atraumatic  Nose: Congestion and rhinorrhea present  Musculoskeletal:         General: Normal range of motion  Cervical back: Normal range of motion  Neurological:      Mental Status: She is alert  Psychiatric:         Behavior: Behavior normal          Thought Content:  Thought content normal  Judgment: Judgment normal           I spent 15 minutes directly with the patient during this visit

## 2023-01-25 NOTE — ASSESSMENT & PLAN NOTE
Start zithromax  Directions for use and possible side effects discussed and patient verbalized understanding of these  The patient was instructed to change their toothbrush to avoid reinfection

## 2023-01-25 NOTE — LETTER
January 25, 2023     Patient: Taisha Yuan  YOB: 1975  Date of Visit: 1/25/2023      To Whom it May Concern:    Paulino Harada is under my professional care  Jose Austin was seen in my office on 1/25/2023  Jose Austin may return to work on 1/30/23  If you have any questions or concerns, please don't hesitate to call           Sincerely,          Evy Parnell PA-C        CC: No Recipients

## 2023-01-31 ENCOUNTER — SOCIAL WORK (OUTPATIENT)
Dept: BEHAVIORAL/MENTAL HEALTH CLINIC | Facility: CLINIC | Age: 48
End: 2023-01-31

## 2023-01-31 DIAGNOSIS — F39 MOOD DISORDER (HCC): Primary | ICD-10-CM

## 2023-01-31 DIAGNOSIS — F41.1 GENERALIZED ANXIETY DISORDER: ICD-10-CM

## 2023-01-31 NOTE — PSYCH
Behavioral Health Psychotherapy Progress Note    Psychotherapy Provided: Individual Psychotherapy     1  Mood disorder ([address] )        2  Generalized anxiety disorder            Goals addressed in session: Goal 1     DATA: Ang Chase happily reports that she got the new job working at [email or web address]  She starts February 20  She is feeling really positive aobut this and hopes this goes far to alleviate her depression symptoms  Therapist focuses the session on updating her treatment plan  Ang Chase reports that she has been making positive behavioral changes, including eating better and losing 8 lbs this months  She also cut back greatly on the alcohol consumption over the course of the month  She had one glass of wine last night  Her principle concerns remains her family of origin with whom she seems to fight often  We briefly role play her next anticipated interaction with her sister  During this session, this clinician used the following therapeutic modalities: Client-centered Therapy, Cognitive Behavioral Therapy and Supportive Psychotherapy    Substance Abuse was not addressed during this session  If the client is diagnosed with a co-occurring substance use disorder, please indicate any changes in the frequency or amount of use: NA  Stage of change for addressing substance use diagnoses: No substance use/Not applicable    ASSESSMENT:  Axel Avilez presents with a Euthymic/ normal mood  her affect is Normal range and intensity, which is congruent, with her mood and the content of the session  The client has made progress on their goals  Axel Avilez presents with a none risk of suicide, none risk of self-harm, and none risk of harm to others  For any risk assessment that surpasses a "low" rating, a safety plan must be developed  A safety plan was indicated: no  If yes, describe in detail NA    PLAN: Between sessions, Axel Avilez will continue to make strides towards improved self care  At the next session, the therapist will use Client-centered Therapy, Cognitive Behavioral Therapy, Mindfulness-based Strategies and Supportive Psychotherapy to address depression symptoms  Behavioral Health Treatment Plan and Discharge Planning: Nabeel Quinones is aware of and agrees to continue to work on their treatment plan  They have identified and are working toward their discharge goals   YES    Visit start and stop times:    01/31/23  Start Time: 2228  Stop Time: 1720  Total Visit Time: 39 minutes

## 2023-01-31 NOTE — BH TREATMENT PLAN
Outpatient Behavioral Health Psychotherapy Treatment Plan    Alverto Angeles  1975     Date of Initial Psychotherapy Assessment: 4/16/21   Date of Current Treatment Plan: 01/31/23  Treatment Plan Target Date: 6/31/23  Treatment Plan Expiration Date: 7/31/23    Diagnosis:   1  Mood disorder ([address] )        2  Generalized anxiety disorder            Area(s) of Need: Depression/mood regulation    Long Term Goal 1 (in the client's own words): "I'd like to be able to better manage my mood using my tools"    Stage of Change: Contemplation    Target Date for completion: To be determined by therapist and Verona Jimenez     Anticipated therapeutic modalities: client centered, CBT-DBT, mindfulness     People identified to complete this goal: Verona Jimenez and therapist      Objective 1: (identify the means of measuring success in meeting the objective):   1  Identify and verbalize triggers to depression symptoms  2  Utilize behavioral strategies to overcome depression  3  Identify and replace cognitive self talk that supports low mood (e g  distortions such as blacks and whites)  4  Express feelings associated with depression  5  Improve communication skills with significant others in life  6  Employ skills learned in PHP and reinforced in sessions for regulating mood      Objective 2: (identify the means of measuring success in meeting the objective): Continue to work with PCP for purposes of medication management and follow recommendations      I am currently under the care of a [facility] psychiatric provider: NO    My [facility] psychiatric provider is: NO    I am currently taking psychiatric medications: PCP prescribes celexa and abilify    I feel that I will be ready for discharge from mental health care when I reach the following (measurable goal/objective): "I would feel more at peace "  Verona Jimenez will report that she can successfully manage her mood in a healthy way 50% of the time      For children and adults who have a legal guardian:   Has there been any change to custody orders and/or guardianship status? NA  If yes, attach updated documentation  Davis Correia understanding that she must proceed to the ER if her symptoms worsen and she begins to experience SI/HI  Behavioral Health Treatment Plan [facility]: Diagnosis and Treatment Plan explained to [address] [place] [address] acknowledges an understanding of their diagnosis  Patrica Kraus agrees to this treatment plan  I have been offered a copy of this Treatment Plan   No, IT is working on fixing printer function

## 2023-02-01 RX ORDER — ARIPIPRAZOLE 2 MG/1
TABLET ORAL
Qty: 30 TABLET | Refills: 0 | Status: SHIPPED | OUTPATIENT
Start: 2023-02-01

## 2023-02-28 ENCOUNTER — SOCIAL WORK (OUTPATIENT)
Dept: BEHAVIORAL/MENTAL HEALTH CLINIC | Facility: CLINIC | Age: 48
End: 2023-02-28

## 2023-02-28 DIAGNOSIS — F39 MOOD DISORDER (HCC): Primary | ICD-10-CM

## 2023-02-28 DIAGNOSIS — F41.1 GENERALIZED ANXIETY DISORDER: ICD-10-CM

## 2023-02-28 NOTE — PSYCH
Behavioral Health Psychotherapy Progress Note    Psychotherapy Provided: Individual Psychotherapy     1  Mood disorder ([address] )        2  Generalized anxiety disorder            Goals addressed in session: Goal 1     DATA: Dinora Espana presents early for tonight's session  She reports that she is doing "better," but continues to suffer with depression  She has started a new job with [employer] and is training  She feels really positive about this, and therapist assists her to process thoughts/feelings  She does begin to ruminate about future possibilities and therapist uses mindfulness strategies to ground her in the present  We conclude early as Dinora Espana states that she has nothing further to talk about  We agree to meet once per month  During this session, this clinician used the following therapeutic modalities: Client-centered Therapy, Mindfulness-based Strategies and Supportive Psychotherapy    Substance Abuse was not addressed during this session  If the client is diagnosed with a co-occurring substance use disorder, please indicate any changes in the frequency or amount of use: NA  Stage of change for addressing substance use diagnoses: No substance use/Not applicable    ASSESSMENT:  Ben Edwards presents with a Euthymic/ normal mood  her affect is Normal range and intensity, which is congruent, with her mood and the content of the session  The client has made progress on their goals  Ben Edwards presents with a none risk of suicide, none risk of self-harm, and none risk of harm to others  For any risk assessment that surpasses a "low" rating, a safety plan must be developed  A safety plan was indicated: no  If yes, describe in detail NA    PLAN: Between sessions, Ben Edwards will remain mindful of focusing only on the present  At the next session, the therapist will use Client-centered Therapy, Mindfulness-based Strategies and Supportive Psychotherapy to address depression      Behavioral Health Treatment Plan and Discharge Planning: Cornel Jordan is aware of and agrees to continue to work on their treatment plan  They have identified and are working toward their discharge goals   YES    Visit start and stop times:    02/28/23  Start Time: 1643  Stop Time: 1710  Total Visit Time: 27 minutes

## 2023-03-26 PROBLEM — J01.00 ACUTE NON-RECURRENT MAXILLARY SINUSITIS: Status: RESOLVED | Noted: 2023-01-25 | Resolved: 2023-03-26

## 2023-03-27 DIAGNOSIS — F33.1 RECURRENT MAJOR DEPRESSIVE EPISODES, MODERATE (HCC): ICD-10-CM

## 2023-03-27 RX ORDER — CITALOPRAM 40 MG/1
40 TABLET ORAL DAILY
Qty: 90 TABLET | Refills: 1 | Status: SHIPPED | OUTPATIENT
Start: 2023-03-27

## 2023-04-12 PROBLEM — R53.1 WEAKNESS: Status: RESOLVED | Noted: 2021-04-07 | Resolved: 2023-04-12

## 2023-04-12 PROBLEM — E66.812 CLASS 2 OBESITY DUE TO EXCESS CALORIES WITHOUT SERIOUS COMORBIDITY WITH BODY MASS INDEX (BMI) OF 36.0 TO 36.9 IN ADULT: Status: ACTIVE | Noted: 2023-04-12

## 2023-04-12 PROBLEM — E66.09 CLASS 2 OBESITY DUE TO EXCESS CALORIES WITHOUT SERIOUS COMORBIDITY WITH BODY MASS INDEX (BMI) OF 36.0 TO 36.9 IN ADULT: Status: ACTIVE | Noted: 2023-04-12

## 2023-04-25 ENCOUNTER — SOCIAL WORK (OUTPATIENT)
Dept: BEHAVIORAL/MENTAL HEALTH CLINIC | Facility: CLINIC | Age: 48
End: 2023-04-25

## 2023-04-25 DIAGNOSIS — F39 MOOD DISORDER (HCC): Primary | ICD-10-CM

## 2023-04-25 DIAGNOSIS — F41.1 GENERALIZED ANXIETY DISORDER: ICD-10-CM

## 2023-04-25 NOTE — PSYCH
Behavioral Health Psychotherapy Progress Note    Psychotherapy Provided: Individual Psychotherapy     1  Mood disorder ([address] )        2  Generalized anxiety disorder            Goals addressed in session: Goal 1     DATA: Artie Villarreal reports today that work is going well, but her mood is very depressed again  She describes sadness, lack of interest in life (not hiking or doing things around the house), and wanting to sleep all the time  She adds that she is overcome with anxiety at times; she fears in addition that she might lose the ability to walk or she may compulsively call someone a derogatory name  She worries if the Abilify is making her anxiety worse  Therapist cautions her against believing so or stopping it abruptly  We discuss again the benefits of seeing a provider, and Artie Villarreal agrees to see one  This appointment is scheduled following this session  Therapist focuses the majority of the session on Brandie's thoughts, which are largely negative  It is pointed out to her that she engages in catastrophic and all or nothing thinking  She also discounts the positive  Therapist offers reframes, validation, and supportive interventions  During this session, this clinician used the following therapeutic modalities: Client-centered Therapy, Cognitive Behavioral Therapy, Mindfulness-based Strategies and Supportive Psychotherapy    Substance Abuse was addressed during this session  If the client is diagnosed with a co-occurring substance use disorder, please indicate any changes in the frequency or amount of use: Artie Villarreal states that she is now having 1-2 glasses of wine each night to help her sleep  Stage of change for addressing substance use diagnoses: Contemplation  She acknowledges that she self medicates with alcohol  ASSESSMENT:  Carla Carvajal presents with a Euthymic/ normal, Anxious and Depressed mood       her affect is Normal range and intensity and Constricted, which is congruent, with her mood and "the content of the session  The client has made progress on their goals  Meryl Hamm presents with a low risk of suicide, low risk of self-harm, and low risk of harm to others  Expresses passive suicidal ideation  Comments \"I can't live like this anymore  \"  Parvin Zeng denies plan or intent to follow through with passive thoughts  For any risk assessment that surpasses a \"low\" rating, a safety plan must be developed  A safety plan was indicated: NO  If yes, describe in detail NA    PLAN: Between sessions, Meryl Hamm will practice thought stopping technique  At the next session, the therapist will use Client-centered Therapy, Cognitive Behavioral Therapy and Supportive Psychotherapy to address depression/anxiety  Behavioral Health Treatment Plan and Discharge Planning: Meryl Hamm is aware of and agrees to continue to work on their treatment plan  They have identified and are working toward their discharge goals   YES    Visit start and stop times:    04/25/23  Start Time: 1615  Stop Time: 1700  Total Visit Time: 45 minutes  "

## 2023-05-01 ENCOUNTER — SOCIAL WORK (OUTPATIENT)
Dept: BEHAVIORAL/MENTAL HEALTH CLINIC | Facility: CLINIC | Age: 48
End: 2023-05-01

## 2023-05-01 DIAGNOSIS — F39 MOOD DISORDER (HCC): Primary | ICD-10-CM

## 2023-05-01 DIAGNOSIS — F41.1 GENERALIZED ANXIETY DISORDER: ICD-10-CM

## 2023-05-02 NOTE — PSYCH
Behavioral Health Psychotherapy Progress Note    Psychotherapy Provided: Individual Psychotherapy     1  Mood disorder ([address] )        2  Generalized anxiety disorder            Goals addressed in session: Goal 1     DATA: James Andres reports that she remains depressed, lacking motivation and wondering what the purpose of her life is  She is enjoying her new job, and comments that she has already made a few friends among her colleagues  Therapist focuses the session on gains made in the last two years and works with James Andres to identify what she can be doing to get busy  She confides that she spends all her time on the sofa following work and cannot get anything done  We also discuss her plans to see a provider in two weeks for medication management  Therapist provides psycho-ed again re: Bipolar Disorder, especially depression  We work together to identify instances of manic behavior including her impulsive decision to pursue an affair 1 5 years ago with a patient's son  Ultimately, she abandoned these plans, but evidences periods of extreme irritability and grandiosity  She also describes intrusive thoughts involving death of loved ones/her dogs  James Andres is somewhat reluctant to try any new medications because she fears the side effects  Therapist encourages her to work with the provider  We agree that she will not see this therapist again until after she has been seen by the provider  During this session, this clinician used the following therapeutic modalities: Client-centered Therapy, Cognitive Behavioral Therapy and Supportive Psychotherapy    Substance Abuse was not addressed during this session  If the client is diagnosed with a co-occurring substance use disorder, please indicate any changes in the frequency or amount of use: NA  Stage of change for addressing substance use diagnoses: No substance use/Not applicable    ASSESSMENT:  Ramirez Butterfield presents with a depressed mood       her affect is Normal "range and intensity, which is congruent, with her mood and the content of the session  The client has made progress on their goals  Maldonado Duckworth presents with a low risk of suicide, low risk of self-harm, and low risk of harm to others  For any risk assessment that surpasses a \"low\" rating, a safety plan must be developed  A safety plan was indicated: no  If yes, describe in detail NA    PLAN: Between sessions, Maldonado Duckworth will strive to get more active  At the next session, the therapist will use Client-centered Therapy, Cognitive Behavioral Therapy, Mindfulness-based Strategies and Supportive Psychotherapy to address depression  Behavioral Health Treatment Plan and Discharge Planning: Maldonado Duckworth is aware of and agrees to continue to work on their treatment plan  They have identified and are working toward their discharge goals   YES    Visit start and stop times:    05/02/23  Start Time: 1648  Stop Time: 1726  Total Visit Time: 38 minutes  "

## 2023-05-15 ENCOUNTER — OFFICE VISIT (OUTPATIENT)
Dept: PSYCHIATRY | Facility: CLINIC | Age: 48
End: 2023-05-15

## 2023-05-15 DIAGNOSIS — F39 MOOD DISORDER (HCC): Primary | ICD-10-CM

## 2023-05-15 RX ORDER — LAMOTRIGINE 25 MG/1
TABLET ORAL
Qty: 42 TABLET | Refills: 0 | Status: SHIPPED | OUTPATIENT
Start: 2023-05-15 | End: 2023-06-12

## 2023-05-15 RX ORDER — CLONIDINE HYDROCHLORIDE 0.1 MG/1
0.1 TABLET ORAL
Qty: 30 TABLET | Refills: 0 | Status: SHIPPED | OUTPATIENT
Start: 2023-05-15
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
## 2023-05-17 VITALS — DIASTOLIC BLOOD PRESSURE: 101 MMHG | SYSTOLIC BLOOD PRESSURE: 161 MMHG | HEART RATE: 79 BPM

## 2023-05-22 ENCOUNTER — TELEPHONE (OUTPATIENT)
Dept: INTERNAL MEDICINE CLINIC | Facility: CLINIC | Age: 48
End: 2023-05-22

## 2023-05-22 DIAGNOSIS — E66.09 CLASS 2 OBESITY DUE TO EXCESS CALORIES WITHOUT SERIOUS COMORBIDITY WITH BODY MASS INDEX (BMI) OF 36.0 TO 36.9 IN ADULT: Primary | ICD-10-CM

## 2023-05-22 DIAGNOSIS — E66.09 CLASS 2 OBESITY DUE TO EXCESS CALORIES WITHOUT SERIOUS COMORBIDITY WITH BODY MASS INDEX (BMI) OF 36.0 TO 36.9 IN ADULT: ICD-10-CM

## 2023-05-22 NOTE — TELEPHONE ENCOUNTER
Patient left message needs a refill in her [facility]  She thinks she needs it increased in the dose      Please send to [facility]     Thank you

## 2023-05-26 DIAGNOSIS — E66.09 CLASS 2 OBESITY DUE TO EXCESS CALORIES WITHOUT SERIOUS COMORBIDITY WITH BODY MASS INDEX (BMI) OF 36.0 TO 36.9 IN ADULT: Primary | ICD-10-CM

## 2023-06-02 ENCOUNTER — OFFICE VISIT (OUTPATIENT)
Dept: URGENT CARE | Facility: CLINIC | Age: 48
End: 2023-06-02

## 2023-06-02 VITALS
RESPIRATION RATE: 20 BRPM | OXYGEN SATURATION: 98 % | HEIGHT: 63 IN | SYSTOLIC BLOOD PRESSURE: 136 MMHG | BODY MASS INDEX: 36.04 KG/M2 | WEIGHT: 203.4 LBS | DIASTOLIC BLOOD PRESSURE: 80 MMHG | TEMPERATURE: 97.7 F | HEART RATE: 76 BPM

## 2023-06-02 DIAGNOSIS — J06.9 URI WITH COUGH AND CONGESTION: Primary | ICD-10-CM

## 2023-06-02 NOTE — PROGRESS NOTES
"  [facility]        NAME: Loren Recinos is a 57 y o  female  : 1975    MRN: [id number]  DATE: 2023  TIME: 2:46 PM    Assessment and Plan   URI with cough and congestion [J06 9]  1  URI with cough and congestion              Patient Instructions     1  Ibuprofen/Motrin 600mg every 6 hours for fever, headaches, body aches   a  Ibuprofen is an NSAID  Please stop medication if you experience stomach/abdominal pain and report to your primary care provider  b  Ask your primary care provider before you take NSAIDs if you are on any blood thinners, or if you have a history of heart disease, kidney disease, gastric bypass surgery, GI bleed, or poorly controlled high blood pressure  2  May use acetaminophen/Tylenol as directed on the bottle between doses of ibuprofen  Do not exceed 4,000mg of Tylenol a day  3  Cough:  a  Guaifenesin/Mucinex as directed on the bottle for congestion and mucous-y cough  b  Dextromethorphan/Delsym for dry cough and cough suppression   c  If prescribed, take Tessalon Pearles as directed  4  Cepacol lozenges, \"throat coat\" tea for sore throat  5  Vitamin/Minerals:  a  Vitamin D3 2,000 IU daily  b  Vitamin C 1000mg twice a day  c  Some studies suggest that Zinc 12 5-15mg every 2 hours while awake for 5 days may shorten symptom duration by 1-2 days  6  Other: Plenty of fluids and rest  7  Follow up with PCP in 3-5 days      Chief Complaint     Chief Complaint   Patient presents with   • Cough     Productive cough for 3 days, yellow sputum  No covid test done    • Nasal Congestion     And sinus pressure for 3 days          History of Present Illness       79-year-old female presents to urgent care for evaluation of runny nose, cough, congestion  The patient states that her symptoms began 3 days ago with a mild sore throat  She does admit to a sensation of fullness in the ears  The patient denies any fever or chills  She denies any nausea, vomiting, diarrhea    She " denies any chest pain, shortness of breath, dizziness  The patient notes that she has a history of seasonal allergies, does not take anything daily for symptoms  The patient also reports that a week ago her  was sick with a similar cold  Review of Systems   Review of Systems   Constitutional: Negative for chills and fever  HENT: Positive for congestion, postnasal drip and rhinorrhea  Negative for ear pain, sinus pressure and sore throat  Eyes: Negative for pain and visual disturbance  Respiratory: Positive for cough  Negative for shortness of breath and wheezing  Cardiovascular: Negative for chest pain and palpitations  Gastrointestinal: Negative for abdominal pain and vomiting  Genitourinary: Negative for dysuria and hematuria  Musculoskeletal: Negative for arthralgias and back pain  Skin: Negative for color change and rash  Neurological: Negative for dizziness, seizures, syncope and headaches  All other systems reviewed and are negative  Current Medications       Current Outpatient Medications:   •  ALPRAZolam (XANAX) 0 5 mg tablet, Take 1 tablet (0 5 mg total) by mouth 3 (three) times a day as needed (as neded) (Patient taking differently: Take 0 5 mg by mouth 3 (three) times a day as needed (as neded) Taking xanax once every few months ), Disp: 90 tablet, Rfl: 0  •  citalopram (CeleXA) 40 mg tablet, Take 1 tablet (40 mg total) by mouth daily, Disp: 90 tablet, Rfl: 1  •  cloNIDine (Catapres) 0 1 mg tablet, Take 1 tablet (0 1 mg total) by mouth daily at bedtime, Disp: 30 tablet, Rfl: 0  •  ibuprofen (MOTRIN) 200 mg tablet, Take by mouth every 6 (six) hours as needed for mild pain, Disp: , Rfl:   •  lamoTRIgine (LaMICtal) 25 mg tablet, Take 1 tablet (25 mg total) by mouth daily for 14 days, THEN 2 tablets (50 mg total) daily for 14 days  , Disp: 42 tablet, Rfl: 0  •  liraglutide (SAXENDA) injection, Inject 0 1 mL (0 6 mg total) under the skin daily, Disp: 15 mL, Rfl: "0  •  omeprazole (PriLOSEC OTC) 20 MG tablet, Take 20 mg by mouth daily, Disp: , Rfl:   •  VITAMIN D PO, Take 1,000 Units by mouth daily, Disp: , Rfl:     Current Allergies     Allergies as of 06/02/2023 - Reviewed 06/02/2023   Allergen Reaction Noted   • Pollen extract Allergic Rhinitis 11/13/2012            The following portions of the patient's history were reviewed and updated as appropriate: allergies, current medications, past family history, past medical history, past social history, past surgical history and problem list      Past Medical History:   Diagnosis Date   • Anxiety    • Candidiasis of mouth     Last Assessed 21Apr2014   • Depression    • Hypertension    • Memory loss 4/7/2021   • Obesity    • Sciatica     Last Assessed [id number]   • Skin rash     Last Assessed [id number]   • Urticaria     Last Assessed [id number]       Past Surgical History:   Procedure Laterality Date   • DENTAL SURGERY         Family History   Problem Relation Age of Onset   • Anxiety disorder Mother    • Depression Mother    • Osteoporosis Mother    • Testicular cancer Father    • Cancer Father    • Hypertension Father    • Alcohol abuse Paternal Grandfather    • Mental illness Paternal Grandmother    • Cancer Family    • Diabetes Family    • Heart disease Family    • Stroke Family         Stroke syndrome   • Drug abuse Neg Hx    • Completed Suicide  Neg Hx          Medications have been verified  Objective   /80   Pulse 76   Temp 97 7 °F (36 5 °C)   Resp 20   Ht 5' 3\" (1 6 m)   Wt 92 3 kg (203 lb 6 4 oz)   SpO2 98%   BMI 36 03 kg/m²        Physical Exam     Physical Exam  Vitals and nursing note reviewed  Constitutional:       General: She is not in acute distress  Appearance: Normal appearance  She is not ill-appearing  HENT:      Head: Normocephalic and atraumatic  Right Ear: Tympanic membrane normal       Left Ear: Tympanic membrane normal       Nose: Congestion present        Mouth/Throat:    " Mouth: Mucous membranes are moist       Pharynx: Oropharynx is clear  No oropharyngeal exudate or posterior oropharyngeal erythema  Eyes:      Extraocular Movements: Extraocular movements intact  Pupils: Pupils are equal, round, and reactive to light  Cardiovascular:      Rate and Rhythm: Normal rate and regular rhythm  Pulses: Normal pulses  Heart sounds: Normal heart sounds  No murmur heard  Pulmonary:      Effort: Pulmonary effort is normal  No respiratory distress  Breath sounds: Normal breath sounds  No wheezing or rhonchi  Abdominal:      Palpations: Abdomen is soft  Musculoskeletal:         General: Normal range of motion  Cervical back: Normal range of motion  Lymphadenopathy:      Cervical: No cervical adenopathy  Skin:     General: Skin is warm and dry  Capillary Refill: Capillary refill takes less than 2 seconds  Neurological:      General: No focal deficit present  Mental Status: She is alert and oriented to person, place, and time     Psychiatric:         Mood and Affect: Mood normal          Behavior: Behavior normal

## 2023-06-05 ENCOUNTER — SOCIAL WORK (OUTPATIENT)
Dept: BEHAVIORAL/MENTAL HEALTH CLINIC | Facility: CLINIC | Age: 48
End: 2023-06-05
Payer: COMMERCIAL

## 2023-06-05 DIAGNOSIS — F39 MOOD DISORDER (HCC): Primary | ICD-10-CM

## 2023-06-05 PROCEDURE — 90837 PSYTX W PT 60 MINUTES: CPT | Performed by: SOCIAL WORKER

## 2023-06-05 NOTE — PSYCH
"Behavioral Health Psychotherapy Progress Note    Psychotherapy Provided: Individual Psychotherapy     1  Mood disorder ([address] )            Goals addressed in session: Goal 1     DATA: William Patel states that she is depressed and hopeless  In addition, she has fleeting thoughts of suicide with no plans or intent  \"I'd never kill myself  \"  William Patel remarks that she would prefer to sleep all the time and she has to push herself to go to work  She is overwhelmed with housework, noting that there are many unfinished projects  Her  Yi Juarez is again leaving the house when not working to help others  This frustrates William Patel, as she feels unsupported  Therapist focuses the session on her thoughts, as she confides that she believes \"everyone hates [her]  \"  Therapist assists her to put this thought on trial, to look for evidence for and against it  William Patle is unable to identify evidence in support of it  We consider other black and white thoughts  William Patel does state that she still enjoys her job but remarks that it is difficult working 12 hour shifts  She does have more time in the evenings which is a plus  Therapist encourages William Patel to [employer] her housework and to aim to get a little done each day  Therapist also recommends finding time to do what she enjoys, e g  walking her dogs  William Patel does report that she is working with Pablo Constant for [employer] purposes, but she could not tolerate the increase in Lamictal   She worries that there is nothing that will help her  Therapist points out that she is catastrophizing  We conclude with therapist encouraging William Patel to give whatever Pablo Constant prescribes a chance  During this session, this clinician used the following therapeutic modalities: Client-centered Therapy, Cognitive Behavioral Therapy, Mindfulness-based Strategies and Supportive Psychotherapy    Substance Abuse was not addressed during this session   If the client is diagnosed with a co-occurring substance use disorder, " "please indicate any changes in the frequency or amount of use: NA  Stage of change for addressing substance use diagnoses: No substance use/Not applicable    ASSESSMENT:  Corina Jean presents with a depressed mood  her affect is Normal range and intensity and Constricted, which is congruent, with her mood and the content of the session  The client has made progress on their goals  Corina Jean presents with a low risk of suicide, low risk of self-harm, and low risk of harm to others  For any risk assessment that surpasses a \"low\" rating, a safety plan must be developed  A safety plan was indicated: no  If yes, describe in detail NA    PLAN: Between sessions, Corina Jean will utilize skills suggested in session  At the next session, the therapist will use Client-centered Therapy, Cognitive Behavioral Therapy, Mindfulness-based Strategies and Supportive Psychotherapy to address depression  Behavioral Health Treatment Plan and Discharge Planning: Corina Jean is aware of and agrees to continue to work on their treatment plan  They have identified and are working toward their discharge goals   YES    Visit start and stop times:    06/05/23  Start Time: 1640  Stop Time: 1740  Total Visit Time: 60 minutes  "

## 2023-06-06 ENCOUNTER — TELEMEDICINE (OUTPATIENT)
Dept: PSYCHIATRY | Facility: CLINIC | Age: 48
End: 2023-06-06
Payer: COMMERCIAL

## 2023-06-06 DIAGNOSIS — F41.1 GENERALIZED ANXIETY DISORDER: Primary | ICD-10-CM

## 2023-06-06 DIAGNOSIS — F39 MOOD DISORDER (HCC): ICD-10-CM

## 2023-06-06 PROCEDURE — 99214 OFFICE O/P EST MOD 30 MIN: CPT | Performed by: NURSE PRACTITIONER

## 2023-06-06 RX ORDER — LAMOTRIGINE 25 MG/1
TABLET ORAL
Qty: 42 TABLET | Refills: 0 | Status: SHIPPED | OUTPATIENT
Start: 2023-06-06 | End: 2023-06-07

## 2023-06-07 NOTE — PSYCH
Virtual Regular Visit    Verification of patient location:    Patient is located in the following state in which I hold an active license [place]    Problem List Items Addressed This Visit        Other    Generalized anxiety disorder - Primary    Mood disorder [facility])          Encounter provider CHAYITO Puga    Provider located at    [address] [place] [id number] [phone]    Recent Visits  Date Type Provider Dept   06/06/23 Martita Damon [address] [facility]   05/31/23 Telephone [address] Chica Aquino recent visits within past 7 days and meeting all other requirements  Future Appointments  No visits were found meeting these conditions  Showing future appointments within next 150 days and meeting all other requirements           The patient was identified by name and date of birth  Patient was informed that this is a telemedicine visit and that the visit is being conducted throughthe [facility]  She agrees to proceed     My office door was closed  No one else was in the room  She acknowledged consent and understanding of privacy and security of the video platform  The patient has agreed to participate and understands they can discontinue the visit at any time  Patient is aware this is a billable service       HPI     Current Outpatient Medications   Medication Sig Dispense Refill   • ALPRAZolam (XANAX) 0 5 mg tablet Take 1 tablet (0 5 mg total) by mouth 3 (three) times a day as needed (as neded) (Patient taking differently: Take 0 5 mg by mouth 3 (three) times a day as needed (as neded) Taking xanax once every few months ) 90 tablet 0   • citalopram (CeleXA) 40 mg tablet Take 1 tablet (40 mg total) by mouth daily 90 tablet 1   • cloNIDine (Catapres) 0 1 mg tablet Take 1 tablet (0 1 mg total) by mouth daily at bedtime 30 tablet 0   • ibuprofen (MOTRIN) 200 mg tablet Take by mouth every 6 (six) hours as needed for mild pain     • lamoTRIgine (LaMICtal) 25 mg tablet Take 2 tablets (50 mg total) by mouth daily 60 tablet 0   • liraglutide (SAXENDA) injection Inject 0 1 mL (0 6 mg total) under the skin daily 15 mL 0   • omeprazole (PriLOSEC OTC) 20 MG tablet Take 20 mg by mouth daily     • VITAMIN D PO Take 1,000 Units by mouth daily       No current facility-administered medications for this visit  Review of Systems  Video Exam    There were no vitals filed for this visit  Physical Exam   As a result of this visit, I have referred the patient for further respiratory evaluation  No    I spent 30 minutes directly with the patient during this visit  [facility] acknowledges that she has consented to an online visit or consultation  She understands that the online visit is based solely on information provided by her, and that, in the absence of a face-to-face physical evaluation by the physician, the diagnosis she receives is both limited and provisional in terms of accuracy and completeness  This is not intended to replace a full medical face-to-face evaluation by the physician  Vicki Juan understands and accepts these terms  MEDICATION MANAGEMENT NOTE        [facility]    Name and Date of Birth:  Vicki Juan 57 y o  1975 MRN: [id number]    Date of Visit: June 6, 2023    Allergies   Allergen Reactions   • Pollen Extract Allergic Rhinitis       Visit Time    Visit Start Time: 0900  Visit Stop Time: 0930  Total Visit Duration: 30 minutes    SUBJECTIVE:    Benedicto Ivory is seen today for a follow up for mood disorder  She reports that she continues to decompensate since the last visit  Benedicto Edinas seen virtually today for medication management follow up  She was last seen by this provider on 5/15/23    She reports today that she has not been taking the increased dose of lamictal "because she took the increased dose for one day and states she felt \"more depressed\" and had dizziness and dry mouth  She reports that previous to increasing the dose, she was having increased suicidal thoughts  She does report that she was drinking alcohol while on the medication as well  She states that currently, her SI is \"not as bad\" and denies any intent or plan  She states her SI was worse when her  was on an overnight for work and states that she \"does not like being in the house alone\"  She reports that she has \"very bad depression\" and everything is bothering her:  Family issues, house responsibilities, new job, her appearance, getting older  She states it is all becoming overwhelming  She reports anhedonia and rates her depression 8/10, depression 5/10  We discussed medication compliance  We discussed PHP, but she is not interested in PHP at this time  We discussed Drug and Alcohol counseling, but she states she \"needs to quit on her own\"  She was encouraged to try the increase in lamictal again over the weekend when she is not working  She agreed and will call next week if she has any concerns or issues  We will follow up in 2 weeks  She is agreeable to this treatment plan  She reports dry mouth and dizziness with one day increase of lamictal     PLAN:  Increase lamictal to 50mg PO daily    Utilize clonidine for anxiety 0 1mg PO daily PRN  Continue celexa 40mg PO Daily  She will continue therapy with Maria Teresa Andino  Follow up with this provider in 2 weeks  She will call sooner with concerns or issues if they arise prior to scheduled appt    Aware of 24 hour and weekend coverage for urgent situations accessed by calling [facility] main practice number  Continue psychotherapy with therapist  Medication management every 2 weeks  Aware of need to follow up with family physician for medical issues    Diagnoses and all orders for this visit:    Generalized anxiety " disorder    Mood disorder (HCC)  -     Discontinue: lamoTRIgine (LaMICtal) 25 mg tablet; Take 1 tablet (25 mg total) by mouth daily for 14 days, THEN 2 tablets (50 mg total) daily for 14 days  Current Outpatient Medications on File Prior to Visit   Medication Sig Dispense Refill   • ALPRAZolam (XANAX) 0 5 mg tablet Take 1 tablet (0 5 mg total) by mouth 3 (three) times a day as needed (as neded) (Patient taking differently: Take 0 5 mg by mouth 3 (three) times a day as needed (as neded) Taking xanax once every few months ) 90 tablet 0   • citalopram (CeleXA) 40 mg tablet Take 1 tablet (40 mg total) by mouth daily 90 tablet 1   • cloNIDine (Catapres) 0 1 mg tablet Take 1 tablet (0 1 mg total) by mouth daily at bedtime 30 tablet 0   • ibuprofen (MOTRIN) 200 mg tablet Take by mouth every 6 (six) hours as needed for mild pain     • liraglutide (SAXENDA) injection Inject 0 1 mL (0 6 mg total) under the skin daily 15 mL 0   • omeprazole (PriLOSEC OTC) 20 MG tablet Take 20 mg by mouth daily     • VITAMIN D PO Take 1,000 Units by mouth daily       No current facility-administered medications on file prior to visit  Psychotherapy Provided:     Individual psychotherapy provided: Yes  Counseling was provided during the session today for 16 minutes  Supportive counseling provided  Importance of follow up for substance abuse issues discussed with Jocelyn Maurice  Medication changes discussed with Jocelyn Maurice  Medication education provided to Jocelyn Maurice  Recent stressor including family conflict, job stress, everyday stressors and occasional anxiety discussed with Jocelyn Maurice  Coping strategies reviewed with Jocelyn Maurice  Importance of medication and treatment compliance reviewed with Jocelyn Maurice  Importance of follow up with family physician for medical issues reviewed with Jocelyn Maurice  Reassurance and supportive therapy provided  Crisis/safety plan discussed with Jocelyn Maurice   Patient will call prior to scheduled appointment if they have any issues or "concerns  Patient understands they can access the office by calling the main number at any time if they are in crisis  They also understand they can call their county's crisis number or go to their nearest ED if suicidal ideation increases or if they develop a plan or intent  HPI ROS Appetite Changes and Sleep:     She reports normal sleep, normal appetite, normal energy level  Denies homicidal ideation, denies suicidal ideation    Review Of Systems:  HPI ROS:               Medication Side Effects:  dry mouth and dizziness from increased lamictal     Depression (10 worst): 8/10 (Was high)   Anxiety (10 worst): 5/10 (Was \"ok\")   Safety concerns (SI, HI, etc): Passive SI, no plan or intent (Was passive SI, no plan or intent)   Sleep: \"ok\" (Was poor, She reports waking up several times during the night with racing thoughts, sometimes with the inability to fall back to sleep    She [place] to [place] 8-830[place] and wakes at [employer] for work  )   Energy: adequate (Was fair)   Appetite: adequate (Was fair)     General emotional problems and decreased functioning   Personality no change in personality   Constitutional as noted in HPI   ENT negative   Cardiovascular negative   Respiratory negative   Gastrointestinal negative   Genitourinary negative   Musculoskeletal negative   Integumentary negative   Neurological negative   Endocrine negative   Other Symptoms none, all other systems are negative     Mental Status Evaluation:    Appearance Appropriately dressed and Good eye contact   Behavior calm and cooperative   Mood anxious and depressed  Depression Scale - 8 of 10 (0 = No depression)  Anxiety Scale - 5 of 10 (0 = No anxiety)   Speech Normal rate and volume   Affect mood-congruent   Thought Processes Goal directed and coherent   Thought Content Does not verbalize delusional material   Associations Tightly connected   Perceptual Disturbances Denies hallucinations and does not appear to be responding to internal stimuli " Risk Potential Suicidal/Homicidal Ideation - Passive suicidal ideation without intent or plan  Risk of Violence - No evidence of risk for violence found on assessment  Risk of Self Mutilation - No evidence of risk for self mutilation found on assessment   Orientation oriented to person, place, time/date and situation   Memory recent and remote memory grossly intact   Consciousness alert and awake   Attention/Concentration attention span and concentration are age appropriate   Insight fair   Judgement fair   Muscle Strength and Gait normal muscle strength and normal muscle tone, normal gait/station and normal balance   Motor Activity no abnormal movements   Language no difficulty naming common objects, no difficulty repeating a phrase, no difficulty writing a sentence   Fund of Knowledge adequate knowledge of current events  adequate fund of knowledge regarding past history  adequate fund of knowledge regarding vocabulary      Past Psychiatric History  Previous diagnoses include depression and anxiety     Prior outpatient psychiatric treatment: n/a, just PHP     Prior therapy: yes, at age 22-23, now with Petros Sweet LCSW     Prior inpatient psychiatric treatment: [facility] in 2021-22     Prior suicide attempts: n/a     Prior self harm: cutting, last time she cut was in 2016 when  cheated    Past Psychiatric History Update:     Inpatient Psychiatric Admission Since Last Encounter:   no  Changes to Outpatient Psychiatric Treatment Team:    no  Suicide Attempt Or Self Mutilation Since Last Encounter:   no  Incidence of Violent Behavior Since Last Encounter:   no    Traumatic History Update:     New Onset of Abuse Since Last Encounter:   no  Traumatic Events Since Last Encounter:   no    Past Medical History:    Past Medical History:   Diagnosis Date   • Anxiety    • Candidiasis of mouth     Last Assessed 21Apr2014   • Depression    • Hypertension    • Memory loss 4/7/2021   • Obesity    • Sciatica Last Assessed [id number]   • Skin rash     Last Assessed [id number]   • Urticaria     Last Assessed 21Mar2014        Past Surgical History:   Procedure Laterality Date   • DENTAL SURGERY       Allergies   Allergen Reactions   • Pollen Extract Allergic Rhinitis     Substance Abuse History:    Social History     Substance and Sexual Activity   Alcohol Use Yes   • Alcohol/week: 9 0 - 12 0 standard drinks of alcohol   • Types: 9 - 12 Glasses of wine per week    Comment: off and on     Social History     Substance and Sexual Activity   Drug Use No     Social History:    Social History     Socioeconomic History   • Marital status: /Civil Union     Spouse name: Not on file   • Number of children: 0   • Years of education: Not on file   • Highest education level: Bachelor's degree (e g , BA, AB, BS)   Occupational History   • Occupation: Visiting RN     Employer: [employer]   Tobacco Use   • Smoking status: Never   • Smokeless tobacco: Never   Vaping Use   • Vaping Use: Never used   Substance and Sexual Activity   • Alcohol use:  Yes     Alcohol/week: 9 0 - 12 0 standard drinks of alcohol     Types: 9 - 12 Glasses of wine per week     Comment: off and on   • Drug use: No   • Sexual activity: Yes   Other Topics Concern   • Not on file   Social History Narrative        Employed - [employer] Full Time    Lives with spouse     Caffeine use    Uses safety equipment: seat belts     Social Determinants of Health     Financial Resource Strain: Not on file   Food Insecurity: Not on file   Transportation Needs: Not on file   Physical Activity: Not on file   Stress: Not on file   Social Connections: Not on file   Intimate Partner Violence: Not on file   Housing Stability: Not on file     Family Psychiatric History:     Family History   Problem Relation Age of Onset   • Anxiety disorder Mother    • Depression Mother    • Osteoporosis Mother    • Testicular cancer Father    • Cancer Father    • Hypertension Father    • Alcohol abuse Paternal Grandfather    • Mental illness Paternal Grandmother    • Cancer Family    • Diabetes Family    • Heart disease Family    • Stroke Family         Stroke syndrome   • Drug abuse Neg Hx    • Completed Suicide  Neg Hx      History Review: The following portions of the patient's history were reviewed and updated as appropriate: allergies, current medications, past family history, past medical history, past social history, past surgical history and problem list     OBJECTIVE:     Vital signs in last 24 hours: There were no vitals filed for this visit    Laboratory Results:   Recent Labs (last 2 months):   Appointment on 04/08/2023   Component Date Value   • WBC 04/08/2023 6 29    • RBC 04/08/2023 4 41    • Hemoglobin 04/08/2023 14 0    • Hematocrit 04/08/2023 42 2    • MCV 04/08/2023 96    • MCH 04/08/2023 31 7    • MCHC 04/08/2023 33 2    • RDW 04/08/2023 13 0    • MPV 04/08/2023 9 7    • Platelets 83/49/0296 371    • nRBC 04/08/2023 0    • Neutrophils Relative 04/08/2023 68    • Immat GRANS % 04/08/2023 0    • Lymphocytes Relative 04/08/2023 24    • Monocytes Relative 04/08/2023 7    • Eosinophils Relative 04/08/2023 1    • Basophils Relative 04/08/2023 0    • Neutrophils Absolute 04/08/2023 4 23    • Immature Grans Absolute 04/08/2023 0 02    • Lymphocytes Absolute 04/08/2023 1 50    • Monocytes Absolute 04/08/2023 0 45    • Eosinophils Absolute 04/08/2023 0 07    • Basophils Absolute 04/08/2023 0 02    • Sodium 04/08/2023 135    • Potassium 04/08/2023 4 1    • Chloride 04/08/2023 107    • CO2 04/08/2023 24    • ANION GAP 04/08/2023 4    • BUN 04/08/2023 9    • Creatinine 04/08/2023 0 59 (L)    • Glucose, Fasting 04/08/2023 93    • Calcium 04/08/2023 9 2    • AST 04/08/2023 21    • ALT 04/08/2023 26    • Alkaline Phosphatase 04/08/2023 71    • Total Protein 04/08/2023 7 2    • Albumin 04/08/2023 3 5    • Total Bilirubin 04/08/2023 0 89    • eGFR 04/08/2023 111    • Cholesterol 04/08/2023 218 (H)    • Triglycerides 04/08/2023 69    • HDL, Direct 04/08/2023 47 (L)    • LDL Calculated 04/08/2023 157 (H)    • Non-HDL-Chol (CHOL-HDL) 04/08/2023 171    • TSH 3RD [facility] 04/08/2023 2 110    • Vit D, 25-Hydroxy 04/08/2023 30 7      I have personally reviewed all pertinent laboratory/tests results  Suicide/Homicide Risk Assessment:    Risk of Harm to Self:  The following ratings are based on assessment at the time of the interview  Recent Specific Risk Factors include: current depressive symptoms, current anxiety symptoms, unstable mood, social isolation  Demographic risk factors include:   Historical Risk Factors include: chronic depression, chronic anxiety symptoms, chronic mood disorder, history of self-abusive behavior, history of abuse, history of impulsive behaviors, history of traumatic experiences  Protective Factors: no current suicidal ideation, access to mental health treatment, being , compliant with medications, compliant with mental health treatment, having a desire to be alive, having pets, responsibilities and duties to others, stable living environment, stable job, sense of determination  Based on today's assessment, Jay Hernandez presents the following risk of harm to self: low    Risk of Harm to Others:   The following ratings are based on assessment at the time of the interview  Protective Factors: no current homicidal ideation  Based on today's assessment, Jay Hernandez presents the following risk of harm to others: none    The following interventions are recommended: contracts for safety at present - agrees to go to ED if feeling unsafe, return in 2 weeks for reassessment, contracts for safety at present - agrees to call Crisis Intervention Service if feeling unsafe    Medications Risks/Benefits:      Risks, Benefits And Possible Side Effects Of Medications:    Discussed risks and benefits of treatment with patient including risk of suicidality, serotonin syndrome, increased QTc interval and SIADH related to treatment with antidepressants; Risk of induction of manic symptoms in certain patient populations, Risk of orthostatic hypotension with clonidine and risk of rash related to treatment with Lamictal     Controlled Medication Discussion:     Not applicable    Treatment Plan:    Due for update/Updated:   no  Last treatment plan done 1/31/233 by Alfred Brar LCSW  Treatment Plan due on 7/31/23  CHAYITO Aviles 06/07/23    This note was shared with patient

## 2023-06-09 DIAGNOSIS — F39 MOOD DISORDER (HCC): ICD-10-CM

## 2023-06-09 RX ORDER — CLONIDINE HYDROCHLORIDE 0.1 MG/1
TABLET ORAL
Qty: 30 TABLET | Refills: 0 | Status: SHIPPED | OUTPATIENT
Start: 2023-06-09

## 2023-06-24 ENCOUNTER — ANESTHESIA (OUTPATIENT)
Dept: PERIOP | Facility: HOSPITAL | Age: 48
End: 2023-06-24
Payer: COMMERCIAL

## 2023-06-24 ENCOUNTER — ANESTHESIA EVENT (OUTPATIENT)
Dept: PERIOP | Facility: HOSPITAL | Age: 48
End: 2023-06-24
Payer: COMMERCIAL

## 2023-06-24 ENCOUNTER — APPOINTMENT (EMERGENCY)
Dept: CT IMAGING | Facility: HOSPITAL | Age: 48
End: 2023-06-24
Payer: COMMERCIAL

## 2023-06-24 ENCOUNTER — HOSPITAL ENCOUNTER (OUTPATIENT)
Facility: HOSPITAL | Age: 48
Discharge: HOME | End: 2023-06-25
Attending: EMERGENCY MEDICINE | Admitting: SURGERY
Payer: COMMERCIAL

## 2023-06-24 DIAGNOSIS — K35.80 ACUTE APPENDICITIS: Primary | ICD-10-CM

## 2023-06-24 DIAGNOSIS — K35.30 ACUTE APPENDICITIS WITH LOCALIZED PERITONITIS, WITHOUT PERFORATION, ABSCESS, OR GANGRENE: ICD-10-CM

## 2023-06-24 LAB
ALBUMIN SERPL BCP-MCNC: 4.4 G/DL (ref 3.5–5)
ALP SERPL-CCNC: 75 U/L (ref 34–104)
ALT SERPL W P-5'-P-CCNC: 15 U/L (ref 7–52)
ANION GAP SERPL CALCULATED.3IONS-SCNC: 9 MMOL/L
AST SERPL W P-5'-P-CCNC: 13 U/L (ref 13–39)
BASOPHILS # BLD AUTO: 0.03 THOUSANDS/ÂΜL (ref 0–0.1)
BASOPHILS NFR BLD AUTO: 0 % (ref 0–1)
BILIRUB SERPL-MCNC: 1.1 MG/DL (ref 0.2–1)
BILIRUB UR QL STRIP: NEGATIVE
BUN SERPL-MCNC: 9 MG/DL (ref 5–25)
CALCIUM SERPL-MCNC: 9.4 MG/DL (ref 8.4–10.2)
CHLORIDE SERPL-SCNC: 100 MMOL/L (ref 96–108)
CLARITY UR: CLEAR
CO2 SERPL-SCNC: 24 MMOL/L (ref 21–32)
COLOR UR: ABNORMAL
CREAT SERPL-MCNC: 0.61 MG/DL (ref 0.6–1.3)
EOSINOPHIL # BLD AUTO: 0.01 THOUSAND/ÂΜL (ref 0–0.61)
EOSINOPHIL NFR BLD AUTO: 0 % (ref 0–6)
ERYTHROCYTE [DISTWIDTH] IN BLOOD BY AUTOMATED COUNT: 12.5 % (ref 11.6–15.1)
GFR SERPL CREATININE-BSD FRML MDRD: 109 ML/MIN/1.73SQ M
GLUCOSE SERPL-MCNC: 111 MG/DL (ref 65–140)
GLUCOSE UR STRIP-MCNC: NEGATIVE MG/DL
HCG SERPL QL: NEGATIVE
HCT VFR BLD AUTO: 43.7 % (ref 34.8–46.1)
HGB BLD-MCNC: 14.5 G/DL (ref 11.5–15.4)
HGB UR QL STRIP.AUTO: NEGATIVE
IMM GRANULOCYTES # BLD AUTO: 0.06 THOUSAND/UL (ref 0–0.2)
IMM GRANULOCYTES NFR BLD AUTO: 0 % (ref 0–2)
KETONES UR STRIP-MCNC: NEGATIVE MG/DL
LEUKOCYTE ESTERASE UR QL STRIP: NEGATIVE
LIPASE SERPL-CCNC: 11 U/L (ref 11–82)
LYMPHOCYTES # BLD AUTO: 1.09 THOUSANDS/ÂΜL (ref 0.6–4.47)
LYMPHOCYTES NFR BLD AUTO: 7 % (ref 14–44)
MCH RBC QN AUTO: 32.1 PG (ref 26.8–34.3)
MCHC RBC AUTO-ENTMCNC: 33.2 G/DL (ref 31.4–37.4)
MCV RBC AUTO: 97 FL (ref 82–98)
MONOCYTES # BLD AUTO: 0.86 THOUSAND/ÂΜL (ref 0.17–1.22)
MONOCYTES NFR BLD AUTO: 6 % (ref 4–12)
NEUTROPHILS # BLD AUTO: 12.96 THOUSANDS/ÂΜL (ref 1.85–7.62)
NEUTS SEG NFR BLD AUTO: 87 % (ref 43–75)
NITRITE UR QL STRIP: NEGATIVE
NRBC BLD AUTO-RTO: 0 /100 WBCS
PH UR STRIP.AUTO: 6.5 [PH]
PLATELET # BLD AUTO: 294 THOUSANDS/UL (ref 149–390)
PMV BLD AUTO: 9.1 FL (ref 8.9–12.7)
POTASSIUM SERPL-SCNC: 3.8 MMOL/L (ref 3.5–5.3)
PROT SERPL-MCNC: 7.4 G/DL (ref 6.4–8.4)
PROT UR STRIP-MCNC: NEGATIVE MG/DL
RBC # BLD AUTO: 4.52 MILLION/UL (ref 3.81–5.12)
SODIUM SERPL-SCNC: 133 MMOL/L (ref 135–147)
SP GR UR STRIP.AUTO: <=1.005
UROBILINOGEN UR QL STRIP.AUTO: 0.2 E.U./DL
WBC # BLD AUTO: 15.01 THOUSAND/UL (ref 4.31–10.16)

## 2023-06-24 PROCEDURE — 84703 CHORIONIC GONADOTROPIN ASSAY: CPT | Performed by: EMERGENCY MEDICINE

## 2023-06-24 PROCEDURE — 96365 THER/PROPH/DIAG IV INF INIT: CPT

## 2023-06-24 PROCEDURE — 36415 COLL VENOUS BLD VENIPUNCTURE: CPT | Performed by: EMERGENCY MEDICINE

## 2023-06-24 PROCEDURE — 88304 TISSUE EXAM BY PATHOLOGIST: CPT | Performed by: PATHOLOGY

## 2023-06-24 PROCEDURE — 99284 EMERGENCY DEPT VISIT MOD MDM: CPT

## 2023-06-24 PROCEDURE — 81003 URINALYSIS AUTO W/O SCOPE: CPT | Performed by: EMERGENCY MEDICINE

## 2023-06-24 PROCEDURE — 83690 ASSAY OF LIPASE: CPT | Performed by: EMERGENCY MEDICINE

## 2023-06-24 PROCEDURE — 96361 HYDRATE IV INFUSION ADD-ON: CPT

## 2023-06-24 PROCEDURE — 96375 TX/PRO/DX INJ NEW DRUG ADDON: CPT

## 2023-06-24 PROCEDURE — 80053 COMPREHEN METABOLIC PANEL: CPT | Performed by: EMERGENCY MEDICINE

## 2023-06-24 PROCEDURE — 74177 CT ABD & PELVIS W/CONTRAST: CPT

## 2023-06-24 PROCEDURE — G1004 CDSM NDSC: HCPCS

## 2023-06-24 PROCEDURE — 85025 COMPLETE CBC W/AUTO DIFF WBC: CPT | Performed by: EMERGENCY MEDICINE

## 2023-06-24 RX ORDER — LORAZEPAM 0.5 MG/1
0.5 TABLET ORAL EVERY 8 HOURS PRN
Status: DISCONTINUED | OUTPATIENT
Start: 2023-06-24 | End: 2023-06-25 | Stop reason: HOSPADM

## 2023-06-24 RX ORDER — CEFAZOLIN SODIUM 2 G/50ML
2000 SOLUTION INTRAVENOUS
Status: DISCONTINUED | OUTPATIENT
Start: 2023-06-25 | End: 2023-06-24

## 2023-06-24 RX ORDER — PANTOPRAZOLE SODIUM 40 MG/1
40 TABLET, DELAYED RELEASE ORAL
Status: DISCONTINUED | OUTPATIENT
Start: 2023-06-25 | End: 2023-06-25 | Stop reason: HOSPADM

## 2023-06-24 RX ORDER — BUPIVACAINE HYDROCHLORIDE 5 MG/ML
INJECTION, SOLUTION EPIDURAL; INTRACAUDAL AS NEEDED
Status: DISCONTINUED | OUTPATIENT
Start: 2023-06-24 | End: 2023-06-24 | Stop reason: HOSPADM

## 2023-06-24 RX ORDER — HEPARIN SODIUM 5000 [USP'U]/ML
5000 INJECTION, SOLUTION INTRAVENOUS; SUBCUTANEOUS EVERY 8 HOURS SCHEDULED
Status: DISCONTINUED | OUTPATIENT
Start: 2023-06-24 | End: 2023-06-25 | Stop reason: HOSPADM

## 2023-06-24 RX ORDER — METOCLOPRAMIDE HYDROCHLORIDE 5 MG/ML
10 INJECTION INTRAMUSCULAR; INTRAVENOUS ONCE
Status: DISCONTINUED | OUTPATIENT
Start: 2023-06-24 | End: 2023-06-24 | Stop reason: HOSPADM

## 2023-06-24 RX ORDER — LIDOCAINE HYDROCHLORIDE 20 MG/ML
INJECTION, SOLUTION EPIDURAL; INFILTRATION; INTRACAUDAL; PERINEURAL AS NEEDED
Status: DISCONTINUED | OUTPATIENT
Start: 2023-06-24 | End: 2023-06-24

## 2023-06-24 RX ORDER — FENTANYL CITRATE 50 UG/ML
INJECTION, SOLUTION INTRAMUSCULAR; INTRAVENOUS AS NEEDED
Status: DISCONTINUED | OUTPATIENT
Start: 2023-06-24 | End: 2023-06-24

## 2023-06-24 RX ORDER — MIDAZOLAM HYDROCHLORIDE 2 MG/2ML
INJECTION, SOLUTION INTRAMUSCULAR; INTRAVENOUS AS NEEDED
Status: DISCONTINUED | OUTPATIENT
Start: 2023-06-24 | End: 2023-06-24

## 2023-06-24 RX ORDER — SODIUM CHLORIDE, SODIUM LACTATE, POTASSIUM CHLORIDE, CALCIUM CHLORIDE 600; 310; 30; 20 MG/100ML; MG/100ML; MG/100ML; MG/100ML
200 INJECTION, SOLUTION INTRAVENOUS CONTINUOUS
Status: DISCONTINUED | OUTPATIENT
Start: 2023-06-24 | End: 2023-06-25 | Stop reason: HOSPADM

## 2023-06-24 RX ORDER — CITALOPRAM 20 MG/1
40 TABLET ORAL DAILY
Status: DISCONTINUED | OUTPATIENT
Start: 2023-06-25 | End: 2023-06-25 | Stop reason: HOSPADM

## 2023-06-24 RX ORDER — MAGNESIUM HYDROXIDE 1200 MG/15ML
LIQUID ORAL AS NEEDED
Status: DISCONTINUED | OUTPATIENT
Start: 2023-06-24 | End: 2023-06-24 | Stop reason: HOSPADM

## 2023-06-24 RX ORDER — PROPOFOL 10 MG/ML
INJECTION, EMULSION INTRAVENOUS AS NEEDED
Status: DISCONTINUED | OUTPATIENT
Start: 2023-06-24 | End: 2023-06-24

## 2023-06-24 RX ORDER — ROCURONIUM BROMIDE 10 MG/ML
INJECTION, SOLUTION INTRAVENOUS AS NEEDED
Status: DISCONTINUED | OUTPATIENT
Start: 2023-06-24 | End: 2023-06-24

## 2023-06-24 RX ORDER — FENTANYL CITRATE/PF 50 MCG/ML
25 SYRINGE (ML) INJECTION
Status: DISCONTINUED | OUTPATIENT
Start: 2023-06-24 | End: 2023-06-24 | Stop reason: HOSPADM

## 2023-06-24 RX ORDER — HYDROMORPHONE HCL/PF 1 MG/ML
0.5 SYRINGE (ML) INJECTION
Status: DISCONTINUED | OUTPATIENT
Start: 2023-06-24 | End: 2023-06-25 | Stop reason: HOSPADM

## 2023-06-24 RX ORDER — METRONIDAZOLE 500 MG/100ML
500 INJECTION, SOLUTION INTRAVENOUS EVERY 8 HOURS
Status: DISCONTINUED | OUTPATIENT
Start: 2023-06-24 | End: 2023-06-25 | Stop reason: HOSPADM

## 2023-06-24 RX ORDER — ONDANSETRON 2 MG/ML
4 INJECTION INTRAMUSCULAR; INTRAVENOUS ONCE
Status: COMPLETED | OUTPATIENT
Start: 2023-06-24 | End: 2023-06-24

## 2023-06-24 RX ORDER — ONDANSETRON 2 MG/ML
4 INJECTION INTRAMUSCULAR; INTRAVENOUS EVERY 6 HOURS PRN
Status: DISCONTINUED | OUTPATIENT
Start: 2023-06-24 | End: 2023-06-25 | Stop reason: HOSPADM

## 2023-06-24 RX ORDER — MORPHINE SULFATE 10 MG/ML
4 INJECTION, SOLUTION INTRAMUSCULAR; INTRAVENOUS
Status: DISCONTINUED | OUTPATIENT
Start: 2023-06-24 | End: 2023-06-25 | Stop reason: HOSPADM

## 2023-06-24 RX ORDER — SUCCINYLCHOLINE/SOD CL,ISO/PF 100 MG/5ML
SYRINGE (ML) INTRAVENOUS AS NEEDED
Status: DISCONTINUED | OUTPATIENT
Start: 2023-06-24 | End: 2023-06-24

## 2023-06-24 RX ORDER — ACETAMINOPHEN 325 MG/1
650 TABLET ORAL EVERY 6 HOURS SCHEDULED
Status: DISCONTINUED | OUTPATIENT
Start: 2023-06-24 | End: 2023-06-25 | Stop reason: HOSPADM

## 2023-06-24 RX ORDER — ONDANSETRON 2 MG/ML
4 INJECTION INTRAMUSCULAR; INTRAVENOUS ONCE AS NEEDED
Status: COMPLETED | OUTPATIENT
Start: 2023-06-24 | End: 2023-06-24

## 2023-06-24 RX ORDER — CEFAZOLIN SODIUM 1 G/3ML
INJECTION, POWDER, FOR SOLUTION INTRAMUSCULAR; INTRAVENOUS AS NEEDED
Status: DISCONTINUED | OUTPATIENT
Start: 2023-06-24 | End: 2023-06-24

## 2023-06-24 RX ORDER — GLYCOPYRROLATE 0.2 MG/ML
INJECTION INTRAMUSCULAR; INTRAVENOUS AS NEEDED
Status: DISCONTINUED | OUTPATIENT
Start: 2023-06-24 | End: 2023-06-24

## 2023-06-24 RX ORDER — NEOSTIGMINE METHYLSULFATE 1 MG/ML
INJECTION INTRAVENOUS AS NEEDED
Status: DISCONTINUED | OUTPATIENT
Start: 2023-06-24 | End: 2023-06-24

## 2023-06-24 RX ORDER — DEXAMETHASONE SODIUM PHOSPHATE 10 MG/ML
INJECTION, SOLUTION INTRAMUSCULAR; INTRAVENOUS AS NEEDED
Status: DISCONTINUED | OUTPATIENT
Start: 2023-06-24 | End: 2023-06-24

## 2023-06-24 RX ORDER — CEFTRIAXONE 1 G/50ML
1000 INJECTION, SOLUTION INTRAVENOUS ONCE
Status: COMPLETED | OUTPATIENT
Start: 2023-06-24 | End: 2023-06-24

## 2023-06-24 RX ORDER — OXYCODONE HYDROCHLORIDE 10 MG/1
10 TABLET ORAL EVERY 6 HOURS PRN
Status: DISCONTINUED | OUTPATIENT
Start: 2023-06-24 | End: 2023-06-25 | Stop reason: HOSPADM

## 2023-06-24 RX ORDER — ONDANSETRON 2 MG/ML
INJECTION INTRAMUSCULAR; INTRAVENOUS AS NEEDED
Status: DISCONTINUED | OUTPATIENT
Start: 2023-06-24 | End: 2023-06-24

## 2023-06-24 RX ORDER — KETOROLAC TROMETHAMINE 30 MG/ML
15 INJECTION, SOLUTION INTRAMUSCULAR; INTRAVENOUS ONCE
Status: COMPLETED | OUTPATIENT
Start: 2023-06-24 | End: 2023-06-24

## 2023-06-24 RX ORDER — LABETALOL HYDROCHLORIDE 5 MG/ML
10 INJECTION, SOLUTION INTRAVENOUS EVERY 6 HOURS PRN
Status: DISCONTINUED | OUTPATIENT
Start: 2023-06-24 | End: 2023-06-25 | Stop reason: HOSPADM

## 2023-06-24 RX ADMIN — KETOROLAC TROMETHAMINE 15 MG: 30 INJECTION, SOLUTION INTRAMUSCULAR at 13:18

## 2023-06-24 RX ADMIN — ROCURONIUM BROMIDE 30 MG: 10 INJECTION, SOLUTION INTRAVENOUS at 17:25

## 2023-06-24 RX ADMIN — SODIUM CHLORIDE, SODIUM LACTATE, POTASSIUM CHLORIDE, AND CALCIUM CHLORIDE 200 ML/HR: .6; .31; .03; .02 INJECTION, SOLUTION INTRAVENOUS at 19:36

## 2023-06-24 RX ADMIN — GLYCOPYRROLATE 0.4 MG: 0.2 INJECTION, SOLUTION INTRAMUSCULAR; INTRAVENOUS at 18:34

## 2023-06-24 RX ADMIN — SODIUM CHLORIDE, SODIUM LACTATE, POTASSIUM CHLORIDE, AND CALCIUM CHLORIDE: .6; .31; .03; .02 INJECTION, SOLUTION INTRAVENOUS at 16:54

## 2023-06-24 RX ADMIN — DEXAMETHASONE SODIUM PHOSPHATE 10 MG: 10 INJECTION, SOLUTION INTRAMUSCULAR; INTRAVENOUS at 17:38

## 2023-06-24 RX ADMIN — IOHEXOL 100 ML: 350 INJECTION, SOLUTION INTRAVENOUS at 14:27

## 2023-06-24 RX ADMIN — PROPOFOL 200 MG: 10 INJECTION, EMULSION INTRAVENOUS at 17:18

## 2023-06-24 RX ADMIN — MIDAZOLAM 2 MG: 1 INJECTION INTRAMUSCULAR; INTRAVENOUS at 17:07

## 2023-06-24 RX ADMIN — SODIUM CHLORIDE 1000 ML: 0.9 INJECTION, SOLUTION INTRAVENOUS at 12:41

## 2023-06-24 RX ADMIN — NEOSTIGMINE METHYLSULFATE 4 MG: 1 INJECTION INTRAVENOUS at 18:34

## 2023-06-24 RX ADMIN — SODIUM CHLORIDE, SODIUM LACTATE, POTASSIUM CHLORIDE, AND CALCIUM CHLORIDE 200 ML/HR: .6; .31; .03; .02 INJECTION, SOLUTION INTRAVENOUS at 23:11

## 2023-06-24 RX ADMIN — ONDANSETRON 4 MG: 2 INJECTION INTRAMUSCULAR; INTRAVENOUS at 17:40

## 2023-06-24 RX ADMIN — METRONIDAZOLE 500 MG: 500 INJECTION, SOLUTION INTRAVENOUS at 15:42

## 2023-06-24 RX ADMIN — FENTANYL CITRATE 100 MCG: 50 INJECTION, SOLUTION INTRAMUSCULAR; INTRAVENOUS at 17:18

## 2023-06-24 RX ADMIN — CEFAZOLIN 2000 MG: 1 INJECTION, POWDER, FOR SOLUTION INTRAMUSCULAR; INTRAVENOUS at 17:22

## 2023-06-24 RX ADMIN — FENTANYL CITRATE 100 MCG: 50 INJECTION, SOLUTION INTRAMUSCULAR; INTRAVENOUS at 17:39

## 2023-06-24 RX ADMIN — LIDOCAINE HYDROCHLORIDE 100 MG: 20 INJECTION, SOLUTION EPIDURAL; INFILTRATION; INTRACAUDAL; PERINEURAL at 17:18

## 2023-06-24 RX ADMIN — METRONIDAZOLE 500 MG: 500 INJECTION, SOLUTION INTRAVENOUS at 23:14

## 2023-06-24 RX ADMIN — Medication 160 MG: at 17:18

## 2023-06-24 RX ADMIN — CEFTRIAXONE 1000 MG: 1 INJECTION, SOLUTION INTRAVENOUS at 15:07

## 2023-06-24 RX ADMIN — ONDANSETRON 4 MG: 2 INJECTION INTRAMUSCULAR; INTRAVENOUS at 19:06

## 2023-06-24 RX ADMIN — ONDANSETRON 4 MG: 2 INJECTION INTRAMUSCULAR; INTRAVENOUS at 12:42

## 2023-06-24 RX ADMIN — ACETAMINOPHEN 650 MG: 325 TABLET ORAL at 23:10

## 2023-06-24 RX ADMIN — SODIUM CHLORIDE, SODIUM LACTATE, POTASSIUM CHLORIDE, AND CALCIUM CHLORIDE 200 ML/HR: .6; .31; .03; .02 INJECTION, SOLUTION INTRAVENOUS at 16:26

## 2023-06-24 NOTE — ASSESSMENT & PLAN NOTE
77-year-old female presenting with 1 day of nausea, diarrhea, and right lower quadrant abdominal pain  CT in the ER confirms the diagnosis of acute appendicitis with a retrocecal position  Ongoing pain in the right lower quadrant, nausea improved  Tender to percussion and palpation in the right lower quadrant no guarding  Afebrile vital signs stable on room air  WBC 15,000  Assessment:  Acute uncomplicated appendicitis    Plan:  Drank water this morning, no food today  OR today with Dr Umair Akhtar for laparoscopic appendectomy  Details of the procedure and risks related to anesthesia, bleeding, postoperative infection/abscess requiring additional procedures were reviewed and informed written consent obtained  Continue IV fluids, NPO, pain control

## 2023-06-24 NOTE — PLAN OF CARE
Problem: PAIN - ADULT  Goal: Verbalizes/displays adequate comfort level or baseline comfort level  Description: Interventions:  - Encourage patient to monitor pain and request assistance  - Assess pain using appropriate pain scale  - Administer analgesics based on type and severity of pain and evaluate response  - Implement non-pharmacological measures as appropriate and evaluate response  - Consider cultural and social influences on pain and pain management  - Notify physician/advanced practitioner if interventions unsuccessful or patient reports new pain  Outcome: Progressing     Problem: INFECTION - ADULT  Goal: Absence or prevention of progression during hospitalization  Description: INTERVENTIONS:  - Assess and monitor for signs and symptoms of infection  - Monitor lab/diagnostic results  - Monitor all insertion sites, i e  indwelling lines, tubes, and drains  - Monitor endotracheal if appropriate and nasal secretions for changes in amount and color  - [place] appropriate cooling/warming therapies per order  - Administer medications as ordered  - Instruct and encourage patient and family to use good hand hygiene technique  - Identify and instruct in appropriate isolation precautions for identified infection/condition  Outcome: Progressing     Problem: DISCHARGE PLANNING  Goal: Discharge to home or other facility with appropriate resources  Description: INTERVENTIONS:  - Identify barriers to discharge w/patient and caregiver  - Arrange for needed discharge resources and transportation as appropriate  - Identify discharge learning needs (meds, wound care, etc )  - Arrange for interpretive services to assist at discharge as needed  - Refer to Case Management Department for coordinating discharge planning if the patient needs post-hospital services based on physician/advanced practitioner order or complex needs related to functional status, cognitive ability, or social support system  Outcome: Progressing Problem: Knowledge Deficit  Goal: Patient/family/caregiver demonstrates understanding of disease process, treatment plan, medications, and discharge instructions  Description: Complete learning assessment and assess knowledge base    Interventions:  - Provide teaching at level of understanding  - Provide teaching via preferred learning methods  Outcome: Progressing

## 2023-06-24 NOTE — H&P
[facility]  68   H&P  Name: Nancy Reyes 57 y o  female I MRN: [id number]  Unit/Bed#: -01 I Date of Admission: 6/24/2023   Date of Service: 6/24/2023 I Hospital Day: 0      Assessment/Plan   * Acute appendicitis with localized peritonitis, without perforation, abscess, or gangrene  Assessment & Plan  43-year-old female presenting with 1 day of nausea, diarrhea, and right lower quadrant abdominal pain  CT in the ER confirms the diagnosis of acute appendicitis with a retrocecal position  Ongoing pain in the right lower quadrant, nausea improved  Tender to percussion and palpation in the right lower quadrant no guarding  Afebrile vital signs stable on room air  WBC 15,000  Assessment:  Acute uncomplicated appendicitis    Plan:  Drank water this morning, no food today  OR today with Dr Esme Wilson for laparoscopic appendectomy  Details of the procedure and risks related to anesthesia, bleeding, postoperative infection/abscess requiring additional procedures were reviewed and informed written consent obtained  Continue IV fluids, NPO, pain control  History of Present Illness   HPI:  Nancy Reyes is a 57 y o  female who presents with cute onset right lower quadrant abdominal pain starting last night and progressing this morning, associated nausea and diarrhea x1  Denies fevers or chills  Admits to some fatigue and malaise  Reports 1 episode of the same symptoms approximately 1 month ago that resolved spontaneously  No prior colonoscopy  Review of Systems   Gastrointestinal: Positive for abdominal pain  All other systems reviewed and are negative        Historical Information   Past Medical History:   Diagnosis Date   • Anxiety    • Candidiasis of mouth     Last Assessed 21Apr2014   • Depression    • Hypertension    • Memory loss 4/7/2021   • Obesity    • Sciatica     Last Assessed [id number]   • Skin rash     Last Assessed [id number]   • Urticaria     Last Assessed [id number]     Past Surgical History:   Procedure Laterality Date   • DENTAL SURGERY       Social History   Social History     Substance and Sexual Activity   Alcohol Use Yes   • Alcohol/week: 9 0 - 12 0 standard drinks of alcohol   • Types: 9 - 12 Glasses of wine per week    Comment: off and on     Social History     Substance and Sexual Activity   Drug Use No     Social History     Tobacco Use   Smoking Status Never   Smokeless Tobacco Never     E-Cigarette/Vaping   • E-Cigarette Use Never User      E-Cigarette/Vaping Substances   • Nicotine No    • THC No    • CBD No    • Flavoring No    • Other No    • Unknown No      Family History:   Family History   Problem Relation Age of Onset   • Anxiety disorder Mother    • Depression Mother    • Osteoporosis Mother    • Testicular cancer Father    • Cancer Father    • Hypertension Father    • Alcohol abuse Paternal Grandfather    • Mental illness Paternal Grandmother    • Cancer Family    • Diabetes Family    • Heart disease Family    • Stroke Family         Stroke syndrome   • Drug abuse Neg Hx    • Completed Suicide  Neg Hx        Meds/Allergies   PTA meds:   Prior to Admission Medications   Prescriptions Last Dose Informant Patient Reported? Taking? ALPRAZolam (XANAX) 0 5 mg tablet   No No   Sig: Take 1 tablet (0 5 mg total) by mouth 3 (three) times a day as needed (as neded)   Patient taking differently: Take 0 5 mg by mouth 3 (three) times a day as needed (as neded) Taking xanax once every few months     VITAMIN D PO  Self Yes No   Sig: Take 1,000 Units by mouth daily   citalopram (CeleXA) 40 mg tablet   No No   Sig: Take 1 tablet (40 mg total) by mouth daily   cloNIDine (CATAPRES) 0 1 mg tablet   No No   Sig: take 1 tablet by mouth every evening at bedtime   Patient taking differently: Take 0 1 mg by mouth if needed   ibuprofen (MOTRIN) 200 mg tablet  Self Yes No   Sig: Take by mouth every 6 (six) hours as needed for mild pain   lamoTRIgine (LaMICtal) 25 mg tablet " No No   Sig: Take 2 tablets (50 mg total) by mouth daily   Patient taking differently: Take 25 mg by mouth daily   liraglutide (SAXENDA) injection   No No   Sig: Inject 0 1 mL (0 6 mg total) under the skin daily   omeprazole (PriLOSEC OTC) 20 MG tablet  Self Yes No   Sig: Take 20 mg by mouth daily      Facility-Administered Medications: None     Allergies   Allergen Reactions   • Pollen Extract Allergic Rhinitis       Objective   First Vitals:   Blood Pressure: 168/90 (06/24/23 1230)  Pulse: 86 (06/24/23 1230)  Temperature: 98 2 °F (36 8 °C) (06/24/23 1230)  Temp Source: Temporal (06/24/23 1230)  Respirations: 18 (06/24/23 1230)  Height: 5' 3\" (160 cm) (06/24/23 1230)  Weight - Scale: 93 4 kg (206 lb) (06/24/23 1230)  SpO2: 96 % (06/24/23 1230)    Current Vitals:   Blood Pressure: (!) 176/95 (06/24/23 1559)  Pulse: 74 (06/24/23 1559)  Temperature: 98 2 °F (36 8 °C) (06/24/23 1230)  Temp Source: Temporal (06/24/23 1230)  Respirations: 17 (06/24/23 1559)  Height: 5' 3\" (160 cm) (06/24/23 1230)  Weight - Scale: 93 4 kg (206 lb) (06/24/23 1230)  SpO2: 98 % (06/24/23 1559)    No intake or output data in the 24 hours ending 06/24/23 1609    Invasive Devices     Peripheral Intravenous Line  Duration           Peripheral IV 06/24/23 Left Antecubital <1 day    Peripheral IV 06/24/23 Proximal;Right;Ventral (anterior) Forearm <1 day                Physical Exam  Vitals and nursing note reviewed  Constitutional:       General: She is not in acute distress  Appearance: She is well-developed  She is not diaphoretic  HENT:      Head: Normocephalic and atraumatic  Eyes:      Conjunctiva/sclera: Conjunctivae normal       Pupils: Pupils are equal, round, and reactive to light  Pulmonary:      Effort: No respiratory distress  Abdominal:      Comments: Soft and nondistended  Percussion and palpation tenderness in the right lower quadrant  No guarding or rigidity     Musculoskeletal:         General: Normal range of " motion  Cervical back: Normal range of motion  Skin:     General: Skin is warm and dry  Capillary Refill: Capillary refill takes less than 2 seconds  Neurological:      Mental Status: She is alert and oriented to person, place, and time  Psychiatric:         Behavior: Behavior normal          Lab Results:   I have personally reviewed pertinent lab results  , CBC:   Lab Results   Component Value Date    WBC 15 01 (H) 06/24/2023    HGB 14 5 06/24/2023    HCT 43 7 06/24/2023    MCV 97 06/24/2023     06/24/2023    RBC 4 52 06/24/2023    MCH 32 1 06/24/2023    MCHC 33 2 06/24/2023    RDW 12 5 06/24/2023    MPV 9 1 06/24/2023    NRBC 0 06/24/2023   , CMP:   Lab Results   Component Value Date    SODIUM 133 (L) 06/24/2023    K 3 8 06/24/2023     06/24/2023    CO2 24 06/24/2023    BUN 9 06/24/2023    CREATININE 0 61 06/24/2023    CALCIUM 9 4 06/24/2023    AST 13 06/24/2023    ALT 15 06/24/2023    ALKPHOS 75 06/24/2023    EGFR 109 06/24/2023     Imaging: I have personally reviewed pertinent reports  EKG, Pathology, and Other Studies: I have personally reviewed pertinent reports  Code Status: No Order  Advance Directive and Living Will:      Power of :    POLST:      Counseling / Coordination of Care  Total floor / unit time spent today 15 minutes  Greater than 50% of total time was spent with the patient and / or family counseling and / or coordination of care  A description of the counseling / coordination of care: treatment planning

## 2023-06-24 NOTE — ED PROVIDER NOTES
History  Chief Complaint   Patient presents with   • Abdominal Pain     Pt reports sharp RLQ abd pain that began last night, +cramping, +nausea, +diarrhea; denies hx of abd surgeries     Patient is a 51-year-old female with history of hypertension hyperlipidemia the presents for evaluation of abdominal pain  She says since last night she has had gradually worsening severe right lower quadrant abdominal pain  Described as sharp stabbing and nonradiating  She has had some nausea without vomiting  Also 1 episode of diarrhea earlier today  No urinary complaints, hematuria or dysuria  No blood in the stool noted  No history abdominal surgeries  Prior to Admission Medications   Prescriptions Last Dose Informant Patient Reported? Taking? ALPRAZolam (XANAX) 0 5 mg tablet   No No   Sig: Take 1 tablet (0 5 mg total) by mouth 3 (three) times a day as needed (as neded)   Patient taking differently: Take 0 5 mg by mouth 3 (three) times a day as needed (as neded) Taking xanax once every few months     VITAMIN D PO  Self Yes No   Sig: Take 1,000 Units by mouth daily   citalopram (CeleXA) 40 mg tablet   No No   Sig: Take 1 tablet (40 mg total) by mouth daily   cloNIDine (CATAPRES) 0 1 mg tablet   No No   Sig: take 1 tablet by mouth every evening at bedtime   Patient taking differently: Take 0 1 mg by mouth if needed   ibuprofen (MOTRIN) 200 mg tablet  Self Yes No   Sig: Take by mouth every 6 (six) hours as needed for mild pain   lamoTRIgine (LaMICtal) 25 mg tablet   No No   Sig: Take 2 tablets (50 mg total) by mouth daily   Patient taking differently: Take 25 mg by mouth daily   liraglutide (SAXENDA) injection   No No   Sig: Inject 0 1 mL (0 6 mg total) under the skin daily   omeprazole (PriLOSEC OTC) 20 MG tablet  Self Yes No   Sig: Take 20 mg by mouth daily      Facility-Administered Medications: None       Past Medical History:   Diagnosis Date   • Anxiety    • Candidiasis of mouth     Last Assessed 21Apr2014 • Depression    • Hypertension    • Memory loss 4/7/2021   • Obesity    • Sciatica     Last Assessed [id number]   • Skin rash     Last Assessed [id number]   • Urticaria     Last Assessed [id number]       Past Surgical History:   Procedure Laterality Date   • DENTAL SURGERY         Family History   Problem Relation Age of Onset   • Anxiety disorder Mother    • Depression Mother    • Osteoporosis Mother    • Testicular cancer Father    • Cancer Father    • Hypertension Father    • Alcohol abuse Paternal Grandfather    • Mental illness Paternal Grandmother    • Cancer Family    • Diabetes Family    • Heart disease Family    • Stroke Family         Stroke syndrome   • Drug abuse Neg Hx    • Completed Suicide  Neg Hx      I have reviewed and agree with the history as documented  E-Cigarette/Vaping   • E-Cigarette Use Never User      E-Cigarette/Vaping Substances   • Nicotine No    • THC No    • CBD No    • Flavoring No    • Other No    • Unknown No      Social History     Tobacco Use   • Smoking status: Never   • Smokeless tobacco: Never   Vaping Use   • Vaping Use: Never used   Substance Use Topics   • Alcohol use: Yes     Alcohol/week: 9 0 - 12 0 standard drinks of alcohol     Types: 9 - 12 Glasses of wine per week     Comment: off and on   • Drug use: No       Review of Systems   Constitutional: Negative for fever  HENT: Negative for sore throat  Respiratory: Negative for shortness of breath  Cardiovascular: Negative for chest pain  Gastrointestinal: Positive for abdominal pain and nausea  Genitourinary: Negative for dysuria  Musculoskeletal: Negative for back pain  Skin: Negative for rash  Neurological: Negative for light-headedness  Psychiatric/Behavioral: Negative for agitation  All other systems reviewed and are negative  Physical Exam  Physical Exam  Vitals reviewed  Constitutional:       General: She is not in acute distress  Appearance: She is well-developed     HENT: Head: Normocephalic  Eyes:      Pupils: Pupils are equal, round, and reactive to light  Cardiovascular:      Rate and Rhythm: Normal rate and regular rhythm  Heart sounds: Normal heart sounds  Pulmonary:      Effort: Pulmonary effort is normal       Breath sounds: Normal breath sounds  Abdominal:      General: Bowel sounds are normal  There is no distension  Palpations: Abdomen is soft  Tenderness: There is abdominal tenderness  There is no guarding  Comments: Severe tenderness to the right lower quadrant with guarding   Musculoskeletal:         General: No tenderness or deformity  Normal range of motion  Cervical back: Normal range of motion and neck supple  Skin:     General: Skin is warm and dry  Capillary Refill: Capillary refill takes less than 2 seconds  Neurological:      Mental Status: She is alert and oriented to person, place, and time  Cranial Nerves: No cranial nerve deficit  Sensory: No sensory deficit  Psychiatric:         Behavior: Behavior normal          Thought Content:  Thought content normal          Judgment: Judgment normal          Vital Signs  ED Triage Vitals [06/24/23 1230]   Temperature Pulse Respirations Blood Pressure SpO2   98 2 °F (36 8 °C) 86 18 168/90 96 %      Temp Source Heart Rate Source Patient Position - Orthostatic VS BP Location FiO2 (%)   Temporal -- Lying Left arm --      Pain Score       6           Vitals:    06/24/23 1230 06/24/23 1330 06/24/23 1520 06/24/23 1559   BP: 168/90 136/70 156/76 (!) 176/95   Pulse: 86 78 81 74   Patient Position - Orthostatic VS: Lying            Visual Acuity      ED Medications  Medications   metroNIDAZOLE (FLAGYL) IVPB (premix) 500 mg 100 mL (0 mg Intravenous Stopped 6/24/23 1626)   ceFAZolin (ANCEF) IVPB (premix in dextrose) 2,000 mg 50 mL (has no administration in time range)   lactated ringers infusion (200 mL/hr Intravenous New Bag 6/24/23 1626)   ondansetron (ZOFRAN) injection 4 mg (has no administration in time range)   ketorolac (TORADOL) injection 15 mg (15 mg Intravenous Given 6/24/23 1318)   ondansetron (ZOFRAN) injection 4 mg (4 mg Intravenous Given 6/24/23 1242)   sodium chloride 0 9 % bolus 1,000 mL (1,000 mL Intravenous New Bag 6/24/23 1241)   iohexol (OMNIPAQUE) 350 MG/ML injection (SINGLE-DOSE) 100 mL (100 mL Intravenous Given 6/24/23 1427)   cefTRIAXone (ROCEPHIN) IVPB (premix in dextrose) 1,000 mg 50 mL (0 mg Intravenous Stopped 6/24/23 1537)       Diagnostic Studies  Results Reviewed     Procedure Component Value Units Date/Time    UA w Reflex to Microscopic w Reflex to Culture [[id number]]  (Abnormal) Collected: 06/24/23 1513    Lab Status: Final result Specimen: Urine, Clean Catch Updated: 06/24/23 1518     Color, UA Straw     Clarity, UA Clear     Specific Gravity, UA <=1 005     pH, UA 6 5     Leukocytes, UA Negative     Nitrite, UA Negative     Protein, UA Negative mg/dl      Glucose, UA Negative mg/dl      Ketones, UA Negative mg/dl      Urobilinogen, UA 0 2 E U /dl      Bilirubin, UA Negative     Occult Blood, UA Negative    hCG, qualitative pregnancy [[id number]]  (Normal) Collected: 06/24/23 1238    Lab Status: Final result Specimen: Blood from Arm, Right Updated: 06/24/23 1314     Preg, Serum Negative    CMP [[id number]]  (Abnormal) Collected: 06/24/23 1238    Lab Status: Final result Specimen: Blood from Arm, Right Updated: 06/24/23 1301     Sodium 133 mmol/L      Potassium 3 8 mmol/L      Chloride 100 mmol/L      CO2 24 mmol/L      ANION GAP 9 mmol/L      BUN 9 mg/dL      Creatinine 0 61 mg/dL      Glucose 111 mg/dL      Calcium 9 4 mg/dL      AST 13 U/L      ALT 15 U/L      Alkaline Phosphatase 75 U/L      Total Protein 7 4 g/dL      Albumin 4 4 g/dL      Total Bilirubin 1 10 mg/dL      eGFR 109 ml/min/1 73sq m     Narrative:      [place] guidelines for Chronic Kidney Disease (CKD):   •  Stage 1 with normal or high GFR (GFR > 90 mL/min/1 73 square meters)  •  Stage 2 Mild CKD (GFR = 60-89 mL/min/1 73 square meters)  •  Stage 3A Moderate CKD (GFR = 45-59 mL/min/1 73 square meters)  •  Stage 3B Moderate CKD (GFR = 30-44 mL/min/1 73 square meters)  •  Stage 4 Severe CKD (GFR = 15-29 mL/min/1 73 square meters)  •  Stage 5 End Stage CKD (GFR <15 mL/min/1 73 square meters)  Note: GFR calculation is accurate only with a steady state creatinine    Lipase [[id number]]  (Normal) Collected: 06/24/23 1238    Lab Status: Final result Specimen: Blood from Arm, Right Updated: 06/24/23 1301     Lipase 11 u/L     CBC and differential [[id number]]  (Abnormal) Collected: 06/24/23 1238    Lab Status: Final result Specimen: Blood from Arm, Right Updated: 06/24/23 1244     WBC 15 01 Thousand/uL      RBC 4 52 Million/uL      Hemoglobin 14 5 g/dL      Hematocrit 43 7 %      MCV 97 fL      MCH 32 1 pg      MCHC 33 2 g/dL      RDW 12 5 %      MPV 9 1 fL      Platelets 619 Thousands/uL      nRBC 0 /100 WBCs      Neutrophils Relative 87 %      Immat GRANS % 0 %      Lymphocytes Relative 7 %      Monocytes Relative 6 %      Eosinophils Relative 0 %      Basophils Relative 0 %      Neutrophils Absolute 12 96 Thousands/µL      Immature Grans Absolute 0 06 Thousand/uL      Lymphocytes Absolute 1 09 Thousands/µL      Monocytes Absolute 0 86 Thousand/µL      Eosinophils Absolute 0 01 Thousand/µL      Basophils Absolute 0 03 Thousands/µL                  CT abdomen pelvis with contrast   Final Result by Uday Rich MD (06/24 6945)      Findings consistent with acute appendicitis  No extraluminal free air  No periappendiceal abscess  2 8 cm left ovarian cyst       I personally discussed this study with DR Maya Bowres on 6/24/2023 3:24 PM                   Workstation performed: [id number]                    Procedures  CriticalCare Time    Date/Time: 6/24/2023 4:28 PM    Performed by: Caty Mauricio MD  Authorized by: Caty Mauricio MD    Critical care provider statement:     Critical care time (minutes):  40    Critical care time was exclusive of:  Separately billable procedures and treating other patients and teaching time    Critical care was necessary to treat or prevent imminent or life-threatening deterioration of the following conditions: Acute appendicitis  Critical care was time spent personally by me on the following activities:  Blood draw for specimens, obtaining history from patient or surrogate, development of treatment plan with patient or surrogate, discussions with consultants, evaluation of patient's response to treatment, examination of patient, re-evaluation of patient's condition, ordering and review of radiographic studies, ordering and performing treatments and interventions and ordering and review of laboratory studies    I assumed direction of critical care for this patient from another provider in my specialty: no    Comments:      Acute appendicitis             ED Course                               SBIRT 20yo+    Flowsheet Row Most Recent Value   Initial Alcohol Screen: US AUDIT-C     1  How often do you have a drink containing alcohol? 5 Filed at: 06/24/2023 1229   2  How many drinks containing alcohol do you have on a typical day you are drinking? 0 Filed at: 06/24/2023 1229   3a  Male UNDER 65: How often do you have five or more drinks on one occasion? 0 Filed at: 06/24/2023 1229   3b  FEMALE Any Age, or MALE 65+: How often do you have 4 or more drinks on one occassion? 0 Filed at: 06/24/2023 1229   Audit-C Score 5 Filed at: 06/24/2023 1229   ALBINA: How many times in the past year have you    Used an illegal drug or used a prescription medication for non-medical reasons? Never Filed at: 06/24/2023 1229                    Medical Decision Making  Patient is a 82-year-old female that presents for evaluation of severe right lower quadrant abdominal pain  Initial concern for appendicitis versus ovarian torsion  Hemodynamically stable  Blood work reviewed and leukocytosis noted  Imaging reviewed concern for appendicitis  Started with IV antibiotics  Discussed with general surgery plan to admit the patient and plan for potential OR  Amount and/or Complexity of Data Reviewed  Labs: ordered  Radiology: ordered  Risk  Prescription drug management  Decision regarding hospitalization  Disposition  Final diagnoses:   Acute appendicitis     Time reflects when diagnosis was documented in both MDM as applicable and the Disposition within this note     Time User Action Codes Description Comment    6/24/2023  3:38 PM Krystle Card Add [K35 80] Acute appendicitis       ED Disposition     ED Disposition   Admit    Condition   Stable    Date/Time   Sat Jun 24, 2023  3:38 PM    Comment   Case was discussed with DUYEN and the patient's admission status was agreed to be Admission Status: observation status to the service of Dr Koffi Kauffman   Follow-up Information     Follow up With Specialties Details Why Contact Info    Clair Loza PA-C Family Medicine, Internal Medicine, Physician Assistant Follow up Due to your age above 39 you are recommended to have colonoscopy to screen for colon cancer  Please discuss further with your PCP or your surgeon   [id number] [facility]            Current Discharge Medication List      CONTINUE these medications which have NOT CHANGED    Details   ALPRAZolam (XANAX) 0 5 mg tablet Take 1 tablet (0 5 mg total) by mouth 3 (three) times a day as needed (as neded)  Qty: 90 tablet, Refills: 0    Associated Diagnoses: Recurrent major depressive disorder, in partial remission (HCC)      citalopram (CeleXA) 40 mg tablet Take 1 tablet (40 mg total) by mouth daily  Qty: 90 tablet, Refills: 1    Associated Diagnoses: Recurrent major depressive episodes, moderate (HCC)      cloNIDine (CATAPRES) 0 1 mg tablet take 1 tablet by mouth every evening at bedtime  Qty: 30 tablet, Refills: 0    Associated Diagnoses: Mood disorder (HCC)      ibuprofen (MOTRIN) 200 mg tablet Take by mouth every 6 (six) hours as needed for mild pain      lamoTRIgine (LaMICtal) 25 mg tablet Take 2 tablets (50 mg total) by mouth daily  Qty: 60 tablet, Refills: 0    Associated Diagnoses: Mood disorder (HCC)      liraglutide (SAXENDA) injection Inject 0 1 mL (0 6 mg total) under the skin daily  Qty: 15 mL, Refills: 0    Associated Diagnoses: Class 2 obesity due to excess calories without serious comorbidity with body mass index (BMI) of 36 0 to 36 9 in adult      omeprazole (PriLOSEC OTC) 20 MG tablet Take 20 mg by mouth daily      VITAMIN D PO Take 1,000 Units by mouth daily             No discharge procedures on file      PDMP Review       Value Time User    PDMP Reviewed  Yes 5/17/2023 11:39 AM Luz Resendez MD          ED Provider  Electronically Signed by           Rod Nathan MD  06/24/23 [id number]

## 2023-06-24 NOTE — PLAN OF CARE
Problem: PAIN - ADULT  Goal: Verbalizes/displays adequate comfort level or baseline comfort level  Description: Interventions:  - Encourage patient to monitor pain and request assistance  - Assess pain using appropriate pain scale  - Administer analgesics based on type and severity of pain and evaluate response  - Implement non-pharmacological measures as appropriate and evaluate response  - Consider cultural and social influences on pain and pain management  - Notify physician/advanced practitioner if interventions unsuccessful or patient reports new pain  Outcome: Progressing     Problem: INFECTION - ADULT  Goal: Absence or prevention of progression during hospitalization  Description: INTERVENTIONS:  - Assess and monitor for signs and symptoms of infection  - Monitor lab/diagnostic results  - Monitor all insertion sites, i e  indwelling lines, tubes, and drains  - Monitor endotracheal if appropriate and nasal secretions for changes in amount and color  - [place] appropriate cooling/warming therapies per order  - Administer medications as ordered  - Instruct and encourage patient and family to use good hand hygiene technique  - Identify and instruct in appropriate isolation precautions for identified infection/condition  Outcome: Progressing     Problem: DISCHARGE PLANNING  Goal: Discharge to home or other facility with appropriate resources  Description: INTERVENTIONS:  - Identify barriers to discharge w/patient and caregiver  - Arrange for needed discharge resources and transportation as appropriate  - Identify discharge learning needs (meds, wound care, etc )  - Arrange for interpretive services to assist at discharge as needed  - Refer to Case Management Department for coordinating discharge planning if the patient needs post-hospital services based on physician/advanced practitioner order or complex needs related to functional status, cognitive ability, or social support system  Outcome: Progressing Problem: Knowledge Deficit  Goal: Patient/family/caregiver demonstrates understanding of disease process, treatment plan, medications, and discharge instructions  Description: Complete learning assessment and assess knowledge base    Interventions:  - Provide teaching at level of understanding  - Provide teaching via preferred learning methods  Outcome: Progressing

## 2023-06-24 NOTE — ANESTHESIA POSTPROCEDURE EVALUATION
Post-Op Assessment Note    CV Status:  Stable  Pain Score: 0    Pain management: adequate     Mental Status:  Awake and sleepy   Hydration Status:  Euvolemic   PONV Controlled:  Controlled   Airway Patency:  Patent      Post Op Vitals Reviewed: Yes      Staff: CRNA         No notable events documented      /62 (06/24/23 1901)    Temp 97 8 °F (36 6 °C) (06/24/23 1901)    Pulse 86 (06/24/23 1901)   Resp 16 (06/24/23 1901)    SpO2 95 % (06/24/23 1901)

## 2023-06-24 NOTE — ANESTHESIA PREPROCEDURE EVALUATION
"Procedure:  APPENDECTOMY LAPAROSCOPIC (Abdomen)    Receive IV flagyl 1542, ceftriaxone 1gm 1507  Toradol 1318 pm (15 mg)  1L IVF bolus  Last PO intake 11 am  Intermittent nausea  URI 3 weeks ago, all symptoms currently resolved    Relevant Problems   CARDIO   (+) Hyperlipidemia   (+) Hypertension      MUSCULOSKELETAL   (+) Low back pain   (+) Right sided sciatica      NEURO/PSYCH   (+) Generalized anxiety disorder   (+) Recurrent major depressive episodes, moderate (HCC)      Other   (+) Class 2 obesity due to excess calories without serious comorbidity with body mass index (BMI) of 36 0 to 36 9 in adult   (+) Mood disorder (HCC)        Physical Exam    Airway    Mallampati score: I  TM Distance: >3 FB  Neck ROM: full     Dental       Cardiovascular      Pulmonary      Other Findings        Anesthesia Plan  ASA Score- 3     Anesthesia Type- general with ASA Monitors  Additional Monitors:   Airway Plan: ETT  Comment: Recent labs personally reviewed:  Lab Results       Component                Value               Date                       WBC                      15 01 (H)           06/24/2023                 HGB                      14 5                06/24/2023                 PLT                      294                 06/24/2023            Lab Results       Component                Value               Date                       NA                       137                 02/17/2015                 K                        3 8                 06/24/2023                 BUN                      9                   06/24/2023                 CREATININE               0 61                06/24/2023                 GLUCOSE                  91                  02/17/2015            No results found for: \"PTT\"   No results found for: \"INR\"    Blood type       I, Santosh Hassan MD, have personally seen and evaluated the patient prior to anesthetic care    I have reviewed the pre-anesthetic record, medical " history, allergies, medications and any other medical records if appropriate to the anesthetic care  If a CRNA is involved in the case, I have reviewed the CRNA assessment, if present, and agree  I consented the patient for general anesthesia with appropriate airway support as indicated  We reviewed the risks associated including PONV, sore throat, allergic reaction to anesthetics and management plan to address these issues  We discussed the indication and risks associated with any invasive monitors that would be placed  We discussed post op pain control and expectations  We discussed rare complications including hypoxia, perioperative cardiac and neurologic events, and death based on the patient's baseline risk  All questions and concerns were addressed          Plan Factors-Exercise tolerance (METS): >4 METS  Chart reviewed  EKG reviewed  Imaging results reviewed  Existing labs reviewed  Patient summary reviewed  Patient is not a current smoker  Patient did not smoke on day of surgery  Obstructive sleep apnea risk education given perioperatively  Induction- intravenous and rapid sequence induction  Postoperative Plan-     Informed Consent- Anesthetic plan and risks discussed with patient  I personally reviewed this patient with the CRNA  Discussed and agreed on the Anesthesia Plan with the CRNA  Jay Delatorre

## 2023-06-24 NOTE — OP NOTE
OPERATIVE REPORT  PATIENT NAME: Dexter Lesch    :  1975  MRN: [id number]  Pt Location: [facility] OR ROOM 02    SURGERY DATE: 2023    Surgeon(s) and Role: * Noe Salinas DO - Primary     * Juni Yip PA-C - Assisting    Preop Diagnosis:  Acute appendicitis [K35 80]    Post-Op Diagnosis Codes:     * Acute appendicitis [K35 80]    Procedure(s):  APPENDECTOMY LAPAROSCOPIC    Specimen(s):  ID Type Source Tests Collected by Time Destination   1 :  Tissue Appendix TISSUE EXAM Noe Salinas DO 2023 1736        Estimated Blood Loss:   Minimal    Drains:  * No LDAs found *    Anesthesia Type:   General    Operative Indications:  Acute appendicitis [K35 80]      Operative Findings:  Retrocecal appendix with significant inflammation and almost cocooned in pericolonic fat/rind    Complications:   None    Procedure and Technique:  Patient was brought operating room and placed in supine position operating table  All the regular monitoring devices were then connected  The patient underwent general anesthesia with endotracheal intubation without complication the patient received perioperative antibiotics  The patient receive bilateral lower sequential crosswise device for DVT prophylaxis  The patient was then prepped and draped in usual sterile fashion  Timeout was performed to verify correct patient, procedure compasses, site  A supraumbilical incision was made using a 15 blade scalpel this was carried down through the skin subcutaneous fat using Bovie electrocautery  Further dissection continued with the S retractors into the fascia was identified  The fascia was then grasped with 2 [place]'s elevated and incised using Bovie electrocautery  Using finger fracture technique the peritoneum was then incised  A Sanchez trocar was inserted under direct vision  The balloon of the trocar was then filled with 20 cc of air and secured with 2 anchoring sutures that were placed in the fascia    The patient's abdomen is then insufflated with carbon oxide to a pressure of 12 to 14 mmHg  The patient tolerated insufflation well   2 additional trocars were then placed in the left lower quadrant and the suprapubic region care was taken not to injure the bladder nor the inferior epigastric vessels  The patient was then placed in Trendelenburg with right side up  The cecum was identified and gently grasped and retracted medially  There is some noted adhesions to the abdominal sidewall which were taken down with a combination of blunt and sharp technique using the Enseal device  Once the cecum was then rotated medially and the appendix base was identified and this was traced retrocecal fashion  The appendix appeared to be fused to the port retrocecal aspect of of the cecum/ascending colon  Attempt was made to gently dissect off the appendix and mesoappendix however this was unsuccessful and there is some noted bleeding  This was cauterized using the Enseal device  The base was then grasped and a window was made between the mesoappendix and the base of the appendix  A Endo TANNER stapling device with a blue load was then inserted and the base was then transected  This allowed the appendiceal base to be grasped and elevated  Using Enseal device the mesoappendix was then sharply taken down along the underside of the appendix  The colon and TI were in [place] the entire time  At this point the appendix started to peel away once the mesoappendix was taken down  However the appendix was noted to be extremely long and was cocoon to around some pericolonic fat/rind  Gentle traction was placed in the appendix to basically dissected free  In doing so the appendix unfortunately I was transected near the midportion  This was then placed in the pericolonic gutter  Further dissection revealed an additional few centimeters of the appendix which were again in retrocecal fashion and wrapped and this rind    Once the remainder of the appendix was then dissected free this portion along with the proximal portion apex were placed in Endo Catch bag  Any additional blood along the pericolic gutter and the lateral edges of the liver was then aspirated  Irrigation was used to free up any clots that formed  There is no purulence noted and therefore was felt safe to irrigate  The patient was then leveled position and there still was noted to be some blood-tinged fluid despite aspirating there was a small amount of welling up  The cecum ascending colon and mesentery were all evaluated and there appeared to be no active bleeding  The staple line was evaluated and there appeared to be no active bleeding  The remaining mesoappendix was evaluated and appeared to be no active bleeding  A Ray-Shelli was then inserted into the abdomen and pressure was held along the hepatic flexure  This was done for approximately 3 minutes  The Ray-Shelli was then evaluated and this appeared to be just serosanguineous fluid and not any blood and there absolutely no blood clots  At this point time the procedure was terminated  The Ray-Shelli was then removed  The trocars were then removed under direct vision  The umbilical trocar was then removed along with the Endo Catch bag  The fascia the umbilical port was then closed with 0 Vicryl  The skin was then approximated using 4-0 Monocryl  Surgical glue was then applied  The patient Toller procedure well taken to the postanesthesia care unit in stable condition for all lap, needle, instrument counts were correct  I was present for the entire procedure , A qualified resident physician was not available  and A physician assistant was required during the procedure for retraction, tissue handling, dissection and suturing      Patient Disposition:  PACU     This procedure was not performed to treat colon cancer through resection      SIGNATURE: Tena Martins DO  DATE: June 24, 2023  TIME: 7:09 PM

## 2023-06-25 LAB
ERYTHROCYTE [DISTWIDTH] IN BLOOD BY AUTOMATED COUNT: 12.6 % (ref 11.6–15.1)
HCT VFR BLD AUTO: 37.4 % (ref 34.8–46.1)
HGB BLD-MCNC: 12.4 G/DL (ref 11.5–15.4)
MCH RBC QN AUTO: 32.5 PG (ref 26.8–34.3)
MCHC RBC AUTO-ENTMCNC: 33.2 G/DL (ref 31.4–37.4)
MCV RBC AUTO: 98 FL (ref 82–98)
PLATELET # BLD AUTO: 264 THOUSANDS/UL (ref 149–390)
PMV BLD AUTO: 9.6 FL (ref 8.9–12.7)
RBC # BLD AUTO: 3.81 MILLION/UL (ref 3.81–5.12)
WBC # BLD AUTO: 15.06 THOUSAND/UL (ref 4.31–10.16)

## 2023-06-25 PROCEDURE — 85027 COMPLETE CBC AUTOMATED: CPT | Performed by: PHYSICIAN ASSISTANT

## 2023-06-25 RX ORDER — OXYCODONE HYDROCHLORIDE 5 MG/1
5 TABLET ORAL EVERY 6 HOURS PRN
Qty: 12 TABLET | Refills: 0 | Status: SHIPPED | OUTPATIENT
Start: 2023-06-25 | End: 2023-07-05

## 2023-06-25 RX ADMIN — PANTOPRAZOLE SODIUM 40 MG: 40 TABLET, DELAYED RELEASE ORAL at 06:23

## 2023-06-25 RX ADMIN — CITALOPRAM HYDROBROMIDE 40 MG: 20 TABLET ORAL at 09:31

## 2023-06-25 RX ADMIN — SODIUM CHLORIDE, SODIUM LACTATE, POTASSIUM CHLORIDE, AND CALCIUM CHLORIDE 200 ML/HR: .6; .31; .03; .02 INJECTION, SOLUTION INTRAVENOUS at 03:43

## 2023-06-25 RX ADMIN — ACETAMINOPHEN 650 MG: 325 TABLET ORAL at 06:23

## 2023-06-25 RX ADMIN — METRONIDAZOLE 500 MG: 500 INJECTION, SOLUTION INTRAVENOUS at 06:23

## 2023-06-25 NOTE — DISCHARGE INSTR - AVS FIRST PAGE
[facility]    Discharge Instructions  Light activity for 2 weeks  No heavy lifting for 2 weeks  Max 10 lbs for 2 weeks  No driving for 3-5 days or until pain is well controlled  Remove dressing tomorrow  Surgical glue will fall off with time  You may shower today  Take discharge medications as prescribed  Notify our office for nausea, vomiting, fever, diarrhea, chest pain, trouble breathing  Follow up in our office in 2 weeks or sooner if needed  Call with additional questions or concerns [phone]  For pain you may take ibuprofen 600 mg every 6 hours scheduled for 3 days then as needed  You can also take acetaminophen 650 mg every 6 hours scheduled for 3 days then as needed  For ongoing pain you can alternate ibuprofen and acetaminophen every 3 hours  If pain not controlled with this regimen you can use Oxycodone as prescribed  Ice packs may be helpful, 20 min on, 20 min off alternating and monitoring skin

## 2023-06-25 NOTE — ASSESSMENT & PLAN NOTE
Assessment:  Acute appendicitis    Plan:  Stable stop day 1 status post laparoscopic appendectomy  Discharge home with outpatient follow-up in 2 weeks

## 2023-06-25 NOTE — PLAN OF CARE
Problem: PAIN - ADULT  Goal: Verbalizes/displays adequate comfort level or baseline comfort level  Description: Interventions:  - Encourage patient to monitor pain and request assistance  - Assess pain using appropriate pain scale  - Administer analgesics based on type and severity of pain and evaluate response  - Implement non-pharmacological measures as appropriate and evaluate response  - Consider cultural and social influences on pain and pain management  - Notify physician/advanced practitioner if interventions unsuccessful or patient reports new pain  6/25/2023 1146 by Katerina Longoria RN  Outcome: Adequate for Discharge  6/25/2023 1052 by Katerina Longoria RN  Outcome: Progressing     Problem: INFECTION - ADULT  Goal: Absence or prevention of progression during hospitalization  Description: INTERVENTIONS:  - Assess and monitor for signs and symptoms of infection  - Monitor lab/diagnostic results  - Monitor all insertion sites, i e  indwelling lines, tubes, and drains  - Monitor endotracheal if appropriate and nasal secretions for changes in amount and color  - [place] appropriate cooling/warming therapies per order  - Administer medications as ordered  - Instruct and encourage patient and family to use good hand hygiene technique  - Identify and instruct in appropriate isolation precautions for identified infection/condition  6/25/2023 1146 by Katerina Longoria RN  Outcome: Adequate for Discharge  6/25/2023 1052 by Katerina Longoria RN  Outcome: Progressing     Problem: DISCHARGE PLANNING  Goal: Discharge to home or other facility with appropriate resources  Description: INTERVENTIONS:  - Identify barriers to discharge w/patient and caregiver  - Arrange for needed discharge resources and transportation as appropriate  - Identify discharge learning needs (meds, wound care, etc )  - Arrange for interpretive services to assist at discharge as needed  - Refer to Case Management Department for coordinating discharge planning if the patient needs post-hospital services based on physician/advanced practitioner order or complex needs related to functional status, cognitive ability, or social support system  6/25/2023 1146 by Chiquita Cadet RN  Outcome: Adequate for Discharge  6/25/2023 1052 by Chiquita Cadet RN  Outcome: Progressing     Problem: Knowledge Deficit  Goal: Patient/family/caregiver demonstrates understanding of disease process, treatment plan, medications, and discharge instructions  Description: Complete learning assessment and assess knowledge base    Interventions:  - Provide teaching at level of understanding  - Provide teaching via preferred learning methods  6/25/2023 1146 by Chiquita Cadet RN  Outcome: Adequate for Discharge  6/25/2023 1052 by Chiquita Cadet RN  Outcome: Progressing

## 2023-06-25 NOTE — DISCHARGE SUMMARY
[facility]  Discharge- [place] 1975, 57 y o  female MRN: [id number]  Unit/Bed#: -01 Encounter: [id number]  Primary Care Provider: Ina Vasquez PA-C   Date and time admitted to hospital: 6/24/2023 12:26 PM    * Acute appendicitis with localized peritonitis, without perforation, abscess, or gangrene  Assessment & Plan  Assessment:  Acute appendicitis    Plan:  Stable stop day 1 status post laparoscopic appendectomy  Discharge home with outpatient follow-up in 2 weeks  Medical Problems     Resolved Problems  Date Reviewed: 6/2/2023   None         Admission Date:   Admission Orders (From admission, onward)     Ordered        06/24/23 1538  Place in Observation  Once                        Admitting Diagnosis: Abdominal pain [R10 9]  Acute appendicitis [K35 80]    HPI: 16-year-old female presenting to the ER with acute onset right lower quadrant abdominal pain  CT confirms the diagnosis of acute appendicitis  Procedures Performed:   Orders Placed This Encounter   Procedures   • Critical Care       Summary of Hospital Course: Underwent laparoscopic appendectomy by Dr Cheyenne Hollis on 3/18/5232 without complications  Convalesced for 1 night and reexamined on the morning of 6/25/2023 where she was found to be clinically hemodynamically and serologically stable for discharge home and outpatient follow-up  Significant Findings, Care, Treatment and Services Provided: Acute appendicitis    Complications: None    Condition at Discharge: stable         Discharge instructions/Information to patient and family:   See after visit summary for information provided to patient and family  Provisions for Follow-Up Care:  See after visit summary for information related to follow-up care and any pertinent home health orders  PCP: Ina Vasquez PA-C    Disposition: Home    Planned Readmission: No    Discharge Statement   I spent 5 minutes discharging the patient   This time was spent on the day of discharge  I had direct contact with the patient on the day of discharge  Additional documentation is required if more than 30 minutes were spent on discharge  Discharge Medications:  See after visit summary for reconciled discharge medications provided to patient and family

## 2023-06-25 NOTE — PLAN OF CARE
Problem: PAIN - ADULT  Goal: Verbalizes/displays adequate comfort level or baseline comfort level  Description: Interventions:  - Encourage patient to monitor pain and request assistance  - Assess pain using appropriate pain scale  - Administer analgesics based on type and severity of pain and evaluate response  - Implement non-pharmacological measures as appropriate and evaluate response  - Consider cultural and social influences on pain and pain management  - Notify physician/advanced practitioner if interventions unsuccessful or patient reports new pain  Outcome: Progressing     Problem: INFECTION - ADULT  Goal: Absence or prevention of progression during hospitalization  Description: INTERVENTIONS:  - Assess and monitor for signs and symptoms of infection  - Monitor lab/diagnostic results  - Monitor all insertion sites, i e  indwelling lines, tubes, and drains  - Monitor endotracheal if appropriate and nasal secretions for changes in amount and color  - [place] appropriate cooling/warming therapies per order  - Administer medications as ordered  - Instruct and encourage patient and family to use good hand hygiene technique  - Identify and instruct in appropriate isolation precautions for identified infection/condition  Outcome: Progressing     Problem: DISCHARGE PLANNING  Goal: Discharge to home or other facility with appropriate resources  Description: INTERVENTIONS:  - Identify barriers to discharge w/patient and caregiver  - Arrange for needed discharge resources and transportation as appropriate  - Identify discharge learning needs (meds, wound care, etc )  - Arrange for interpretive services to assist at discharge as needed  - Refer to Case Management Department for coordinating discharge planning if the patient needs post-hospital services based on physician/advanced practitioner order or complex needs related to functional status, cognitive ability, or social support system  Outcome: Progressing Problem: Knowledge Deficit  Goal: Patient/family/caregiver demonstrates understanding of disease process, treatment plan, medications, and discharge instructions  Description: Complete learning assessment and assess knowledge base    Interventions:  - Provide teaching at level of understanding  - Provide teaching via preferred learning methods  Outcome: Progressing

## 2023-06-26 VITALS
HEART RATE: 54 BPM | DIASTOLIC BLOOD PRESSURE: 66 MMHG | TEMPERATURE: 98.4 F | OXYGEN SATURATION: 94 % | BODY MASS INDEX: 36.5 KG/M2 | HEIGHT: 63 IN | SYSTOLIC BLOOD PRESSURE: 105 MMHG | WEIGHT: 206 LBS | RESPIRATION RATE: 20 BRPM

## 2023-06-29 PROCEDURE — 88304 TISSUE EXAM BY PATHOLOGIST: CPT | Performed by: PATHOLOGY

## 2023-07-03 ENCOUNTER — TELEPHONE (OUTPATIENT)
Dept: SURGERY | Facility: CLINIC | Age: 48
End: 2023-07-03

## 2023-07-10 ENCOUNTER — OFFICE VISIT (OUTPATIENT)
Dept: SURGERY | Facility: CLINIC | Age: 48
End: 2023-07-10

## 2023-07-10 VITALS
HEIGHT: 63 IN | SYSTOLIC BLOOD PRESSURE: 128 MMHG | OXYGEN SATURATION: 97 % | DIASTOLIC BLOOD PRESSURE: 66 MMHG | TEMPERATURE: 96.5 F | HEART RATE: 92 BPM | BODY MASS INDEX: 36.14 KG/M2 | WEIGHT: 204 LBS

## 2023-07-10 DIAGNOSIS — K35.30 ACUTE APPENDICITIS WITH LOCALIZED PERITONITIS, WITHOUT PERFORATION, ABSCESS, OR GANGRENE: Primary | ICD-10-CM

## 2023-07-10 PROCEDURE — 99024 POSTOP FOLLOW-UP VISIT: CPT | Performed by: SURGERY

## 2023-07-10 NOTE — PROGRESS NOTES
Assessment/Plan:    Acute appendicitis with localized peritonitis, without perforation, abscess, or gangrene  Status post laparoscopic appendectomy. Doing well. Pathology reviewed and discussed with the patient. Incision clean dry intact. Follow-up as needed. Diagnoses and all orders for this visit:    Acute appendicitis with localized peritonitis, without perforation, abscess, or gangrene          Subjective:      Patient ID: Dianna Antoine is a 57 y.o. female. 61-year-old female status post laparoscopic appendectomy presents today for postop check. Overall doing well. No nausea vomiting. No fever chills. Tolerating diet. The following portions of the patient's history were reviewed and updated as appropriate:   She  has a past medical history of Anxiety, Candidiasis of mouth, Depression, Hypertension, Memory loss (4/7/2021), Obesity, Sciatica, Skin rash, and Urticaria. She   Patient Active Problem List    Diagnosis Date Noted   • Acute appendicitis with localized peritonitis, without perforation, abscess, or gangrene 06/24/2023   • Class 2 obesity due to excess calories without serious comorbidity with body mass index (BMI) of 36.0 to 36.9 in adult 04/12/2023   • Palpitations 08/04/2022   • Mood disorder ([address]) 01/25/2022   • Generalized anxiety disorder 09/20/2021   • Memory loss 04/07/2021   • Neck pain 04/07/2021   • Balance disorder 04/07/2021   • History of COVID-19 12/18/2020   • Right sided sciatica 07/09/2018   • Low back pain 12/21/2017   • Recurrent major depressive episodes, moderate ([address]) 04/15/2013   • Hyperlipidemia 11/13/2012   • Hypertension 11/13/2012     She  has a past surgical history that includes Dental surgery and APPENDECTOMY LAPAROSCOPIC (N/A, 6/24/2023). Her family history includes Alcohol abuse in her paternal grandfather; Anxiety disorder in her mother; Cancer in her family and father; Depression in her mother; Diabetes in her family;  Heart disease in her family; Hypertension in her father; Mental illness in her paternal grandmother; Osteoporosis in her mother; Stroke in her family; Testicular cancer in her father. She  reports that she has never smoked. She has never used smokeless tobacco. She reports current alcohol use of about 9.0 - 12.0 standard drinks of alcohol per week. She reports that she does not use drugs. Current Outpatient Medications   Medication Sig Dispense Refill   • ALPRAZolam (XANAX) 0.5 mg tablet Take 1 tablet (0.5 mg total) by mouth 3 (three) times a day as needed (as neded) (Patient taking differently: Take 0.5 mg by mouth 3 (three) times a day as needed (as neded) Taking xanax once every few months.) 90 tablet 0   • citalopram (CeleXA) 40 mg tablet Take 1 tablet (40 mg total) by mouth daily 90 tablet 1   • cloNIDine (CATAPRES) 0.1 mg tablet take 1 tablet by mouth every evening at bedtime (Patient taking differently: Take 0.1 mg by mouth if needed) 30 tablet 0   • ibuprofen (MOTRIN) 200 mg tablet Take by mouth every 6 (six) hours as needed for mild pain     • lamoTRIgine (LaMICtal) 25 mg tablet Take 2 tablets (50 mg total) by mouth daily (Patient taking differently: Take 25 mg by mouth daily) 60 tablet 0   • omeprazole (PriLOSEC OTC) 20 MG tablet Take 20 mg by mouth daily     • VITAMIN D PO Take 1,000 Units by mouth daily     • liraglutide (SAXENDA) injection Inject 0.1 mL (0.6 mg total) under the skin daily (Patient not taking: Reported on 7/10/2023) 15 mL 0     No current facility-administered medications for this visit. She is allergic to pollen extract. .    Review of Systems   Constitutional: Negative for activity change, appetite change, chills, diaphoresis, fatigue, fever and unexpected weight change. Gastrointestinal: Negative for abdominal pain, nausea and vomiting.          Objective:      /66 (BP Location: Left arm, Patient Position: Sitting, Cuff Size: Standard)   Pulse 92   Temp (!) 96.5 °F (35.8 °C) (Tympanic)   Ht 5' 3" (1.6 m)   Wt 92.5 kg (204 lb)   LMP 05/12/2023 (Approximate)   SpO2 97%   BMI 36.14 kg/m²          Physical Exam  Vitals reviewed. Constitutional:       General: She is not in acute distress. Appearance: Normal appearance. She is not ill-appearing, toxic-appearing or diaphoretic. HENT:      Head: Normocephalic and atraumatic. Eyes:      General: No scleral icterus. Right eye: No discharge. Left eye: No discharge. Abdominal:      General: There is no distension. Palpations: There is no mass. Tenderness: There is no abdominal tenderness. Hernia: No hernia is present. Comments: Incisions clean dry intact. Neurological:      Mental Status: She is alert.

## 2023-07-10 NOTE — ASSESSMENT & PLAN NOTE
Status post laparoscopic appendectomy. Doing well. Pathology reviewed and discussed with the patient. Incision clean dry intact. Follow-up as needed.

## 2023-09-11 ENCOUNTER — SOCIAL WORK (OUTPATIENT)
Dept: BEHAVIORAL/MENTAL HEALTH CLINIC | Facility: CLINIC | Age: 48
End: 2023-09-11
Payer: COMMERCIAL

## 2023-09-11 DIAGNOSIS — F41.1 GENERALIZED ANXIETY DISORDER: ICD-10-CM

## 2023-09-11 DIAGNOSIS — F39 MOOD DISORDER (HCC): Primary | ICD-10-CM

## 2023-09-11 PROCEDURE — 90832 PSYTX W PT 30 MINUTES: CPT | Performed by: SOCIAL WORKER

## 2023-09-12 NOTE — PSYCH
Behavioral Health Psychotherapy Progress Note    Psychotherapy Provided: Individual Psychotherapy     1. Mood disorder ([address])        2. Generalized anxiety disorder            Goals addressed in session: Goal 1     DATA: Carol Win has not been seen by this therapist since 6/5/23. 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 She also states that she is "unmotivated" to do much of anything when not working. She just tries to get the minimum done; otherwise she sits on the sofa. Carol Win has been camping with her  and she has found some jack in this. But over all she feels continued frustration with her  - who she claims is self centered. Carol Win worries about her dogs, and becomes tearful when remarking that "they are getting old."  Carol Win refuses to take the medications prescribed to her by [facility], citing "too many side effects."  She also continues to drink alcohol frequently, finding that it silences her thoughts. Randi Diego scores a 14 on the PHQ-9. She admits that she has not followed through with any of the recommendations of this therapist.  She wishes to be discharge, stating "I guess I just have to accept that this is the way my life will be."  Therapist briefly reviews interventions discussed and points out to Carol Win that she is more than capable of changing things. Therapist again suggests setting small goals and "pushing" herself to achieve them. Carol Win states that she's "too tired" and again requests discharge. Therapist agrees, advising Carol Win to proceed to the ER if SI returns and she develops a plan. Therapist also assures her that she can return to therapy at any time.   During this session, this clinician used the following therapeutic modalities: Client-centered Therapy, Cognitive Behavioral Therapy, Mindfulness-based Strategies and Supportive Psychotherapy    Substance Abuse was addressed during this session. If the client is diagnosed with a co-occurring substance use disorder, please indicate any changes in the frequency or amount of use: Casper Gilbert states that she drinks alcohol several nights per week. Stage of change for addressing substance use diagnoses: Contemplation    ASSESSMENT:  Jefferson Montelongo presents with a depressed/anxious mood. her affect is Normal range and intensity and Tearful, which is congruent, with her mood and the content of the session. The client has not made progress on their goals. Jefferson Montelongo presents with a low risk of suicide, low risk of self-harm, and low risk of harm to others. For any risk assessment that surpasses a "low" rating, a safety plan must be developed. A safety plan was indicated: no  If yes, describe in detail NA    PLAN: Casper Gilbert will be discharged. She agrees to proceed to the ER if symptoms of SI return and she considers a plan. She is advised that she can return to therapy any time.  Visit start and stop times:    09/12/23  Start Time: 1750  Stop Time: 1820  Total Visit Time: 30 minutes

## 2023-09-13 ENCOUNTER — DOCUMENTATION (OUTPATIENT)
Dept: BEHAVIORAL/MENTAL HEALTH CLINIC | Facility: CLINIC | Age: 48
End: 2023-09-13

## 2023-09-13 DIAGNOSIS — F41.1 GENERALIZED ANXIETY DISORDER: ICD-10-CM

## 2023-09-13 DIAGNOSIS — F39 MOOD DISORDER (HCC): Primary | ICD-10-CM

## 2023-09-13 NOTE — PROGRESS NOTES
Psychotherapy Discharge Summary    Preferred Name: Usha Cobb  YOB: 1975    Admission date to psychotherapy: 4/16/21    Referred by: Courtney Stockton    Presenting Problem: Kimberly Aviles reports that she has been depressed since she was a child. She reports that she had her first "anxiety attack" at age 8. She reports loss of interest, hopelessness, loss of interest, anxiety, inconsistent sleep, and binge eating. Kimberly Aviles denies SI/HI. She states that she has "major social anxiety" which prevents her from going to family functions. She worries about "everything" including health, family, work, and violence. Kimberly Aviles scores a 12 on the PHQ-9 and a 15 on the JOE-7. Course of treatment included : individual therapy     Progress/Outcome of Treatment Goals (brief summary of course of treatment) Therapist and Kimberly Aviles worked together for over two years, targeting primarily her depression symptoms using CBT, DBT, and trauma informed interventions. During this time period, Kimberly Aviles admits to drinking alcohol several nights per week to manage thoughts; she refused referral to addiction counseling. She did, however, follow up with psychiatrist for medication management when therapist suspected a mood disorder. Kimberly Aviles briefly tried the medication, but refused to take it secondary to side effects. Treatment Complications (if any): None noted    Treatment Progress: fair    Current [facility] Psychiatric Provider: Genevieve STRATTON    Discharge Medications include: Celexa, Lamictal, Catapres and Xanax    Discharge Date: 9/13/23    Discharge Diagnosis:   1. Mood disorder ([address])        2. Generalized anxiety disorder            Criteria for Discharge: Kimberly Aviles requested discharge from therapy as she felt that "nothing works" to decrease anxiety and depression symptoms.     Aftercare recommendations include (include specific referral names and phone numbers, if appropriate): Resume medication management and consider returning to psychotherapy. Also pursue addiction counseling.     Prognosis: fair

## 2023-09-29 ENCOUNTER — DOCUMENTATION (OUTPATIENT)
Dept: PSYCHIATRY | Facility: CLINIC | Age: 48
End: 2023-09-29

## 2023-11-12 NOTE — PSYCH
Goal Outcome Evaluation:      Plan of Care Reviewed With: patient  Recd pt [facility] [address] [facility].Pt alert steady with ind ambulation. Denies pain. SR/ST VSS/ Sats 92-94 on 4l. Pt ate dinner, Dr Sanchez aware and US of abd okay to do in am. BC drawn. Pt taking po well/fluids.                   Subjective:     Patient ID: Kylee Hidalgo is a 42 y o  female  [facility] Clinical Progress Notes      Specialized Services Documentation  Therapist must complete separate progress note for each specific clinical activity in which the individual participated during the day  GROUP PSYCHOTHERAPY ([id number])   The group received psycho-education about developing and identifying hope, 4 types of hope and benefits of having hope  Members engaged in discussions about common questions about hope and recognizing barriers to hope  The group participated in an therapeutic "tree of hope" activity that encouraged identification of supports, interests, and sources of comfort and guidance; along with passions, wants and dreams for the future  Gail Baptiste shared that she is struggling with a difficult scenario that she may need to be  from her pets, which are her main support  Gail Baptiste engaged in different group discussions to encourage and support other members  Gail Baptiste demonstrated progress towards goals and objectives through group participation  Continue with psychotherapy  TX Plan Objectives: 1 1, 1 2, 1 4   Therapist: Nicholas Waite MA, [facility]       Education Therapy   [id number] Kylee Hidalgo actively shared in morning assessment and goal review  Presented as Receptive related to readiness to learn  Kylee Hidalgo did complete goal from last treatment day identifying gaining self-care  did not present with any barriers to learning  [id number] Kylee Hidalgo engaged throughout the treatment day  Was engaged in learning related to Illness, Medication, Aftercare and Wellness Tools  Staff utilized Verbal, Written, A/V and Demonstration teaching methods  Kylee Hidalgo shared area of learning and set a goal for outside of program to do taxes, get oil change and dig out journals to start writing in        Tx Plan Objective: 1 1, 1 2, 1 4 Therapist:  Nicholas Waite MA, [facility]

## 2023-11-28 ENCOUNTER — TELEPHONE (OUTPATIENT)
Dept: INTERNAL MEDICINE CLINIC | Facility: CLINIC | Age: 48
End: 2023-11-28

## 2023-11-28 NOTE — TELEPHONE ENCOUNTER
Patient called stating she started with symptoms on Friday. Symptoms include cough, sinus congestion, sore throat, body aches, headache, blocked ears, nausea, post nasal drip. Patient home covid tested at home on Saturday and was negative. Patient covid tested at work (works at [employer]) yesterday and it was negative. Patient is asking for a note to be off today and tomorrow if possible     Patient is taking Over the counter medications, and does not feel like she needs any additional medication sent in. Please advise.

## 2023-11-29 ENCOUNTER — OFFICE VISIT (OUTPATIENT)
Dept: URGENT CARE | Facility: CLINIC | Age: 48
End: 2023-11-29
Payer: COMMERCIAL

## 2023-11-29 VITALS
TEMPERATURE: 97.6 F | SYSTOLIC BLOOD PRESSURE: 143 MMHG | HEART RATE: 73 BPM | RESPIRATION RATE: 18 BRPM | DIASTOLIC BLOOD PRESSURE: 94 MMHG | OXYGEN SATURATION: 95 %

## 2023-11-29 DIAGNOSIS — J02.8 ACUTE PHARYNGITIS DUE TO OTHER SPECIFIED ORGANISMS: ICD-10-CM

## 2023-11-29 DIAGNOSIS — J22 ACUTE RESPIRATORY INFECTION: Primary | ICD-10-CM

## 2023-11-29 DIAGNOSIS — R05.1 ACUTE COUGH: ICD-10-CM

## 2023-11-29 DIAGNOSIS — H65.03 BILATERAL ACUTE SEROUS OTITIS MEDIA, RECURRENCE NOT SPECIFIED: ICD-10-CM

## 2023-11-29 LAB
S PYO AG THROAT QL: NEGATIVE
SARS-COV-2 AG UPPER RESP QL IA: NEGATIVE
VALID CONTROL: NORMAL

## 2023-11-29 PROCEDURE — 87811 SARS-COV-2 COVID19 W/OPTIC: CPT | Performed by: NURSE PRACTITIONER

## 2023-11-29 PROCEDURE — 87070 CULTURE OTHR SPECIMN AEROBIC: CPT | Performed by: NURSE PRACTITIONER

## 2023-11-29 PROCEDURE — 99214 OFFICE O/P EST MOD 30 MIN: CPT | Performed by: NURSE PRACTITIONER

## 2023-11-29 PROCEDURE — 87880 STREP A ASSAY W/OPTIC: CPT | Performed by: NURSE PRACTITIONER

## 2023-11-29 RX ORDER — AMOXICILLIN AND CLAVULANATE POTASSIUM 875; 125 MG/1; MG/1
1 TABLET, FILM COATED ORAL EVERY 12 HOURS SCHEDULED
Qty: 20 TABLET | Refills: 0 | Status: SHIPPED | OUTPATIENT
Start: 2023-11-29 | End: 2023-12-09

## 2023-11-29 NOTE — PROGRESS NOTES
[facility]        NAME: Pepe Nelson is a 57 y.o. female  : 1975    MRN: [id number]  DATE: 2023  TIME: 3:09 PM    Assessment and Plan   Acute respiratory infection [J22]  1. Acute respiratory infection  amoxicillin-clavulanate (AUGMENTIN) 875-125 mg per tablet      2. Bilateral acute serous otitis media, recurrence not specified  amoxicillin-clavulanate (AUGMENTIN) 875-125 mg per tablet      3. Acute pharyngitis due to other specified organisms  POCT rapid strepA    Throat culture    amoxicillin-clavulanate (AUGMENTIN) 875-125 mg per tablet      4. Acute cough  Poct Covid 19 Rapid Antigen Test    amoxicillin-clavulanate (AUGMENTIN) 875-125 mg per tablet            Patient Instructions       Follow up with PCP in 3-5 days. Proceed to  ER if symptoms worsen. Your strep A is negative. You have a throat culture pending. You are to download Shoulder Tap for the results in 3-4 days. You will be notified if the results are + and an antibiotic will be called in for you. You are to do warm salt water gargles 4 x daily. Drink warm tea with honey and lemon. Take tylenol or motrin as able for pain or fever. Chloraseptic throat spray, cough drops. Do not share utensils. Change your tooth brush in 3 days. Follow up with your PCP in 2-3 days  Go to the ED if symptoms worsen      You have bilateral ear infections. You are prescribed Augmentin. You may want to stop the dayquil and take sudafed and claritin if you are able to   Drink plenty of water             Chief Complaint     Chief Complaint   Patient presents with    Sore Throat     C/o sore throat, bilateral ear pain, intermittent productive cough, and headache onset "Friday". Pt reports telehealth visit, states "she gave me off two days but I feel like I need the week". Denies fever. Reports "two home COVID tests and they were negative". Last dose "Dayquil today around 11 a.m.".      Earache    Cough    Headache History of Present Illness       This is a 57year old female who states became ill with sorethroat, bilateral ear pain, intermittent productive cough and headache. She states she tested Saturday and Monday for covid and was negative however a coworker has told other's she is + covid. Pt states she spoke with a telemedicine provider and was given off 2 days of work - yesterday and today. She states she isn't feeling well at all. She states she feels like she needs more time off. She has been taking dayquil w/o any relief. Denies n/v/d, fevers. + bodyaches and chills. Review of Systems   Review of Systems   Constitutional:  Positive for chills and fatigue. Negative for fever. HENT:  Positive for congestion, ear pain and sore throat. Eyes: Negative. Respiratory:  Positive for cough. Cardiovascular: Negative. Gastrointestinal: Negative. Endocrine: Negative. Genitourinary: Negative. Musculoskeletal:  Positive for myalgias. Skin: Negative. Allergic/Immunologic: Negative. Neurological: Negative. Hematological: Negative. Psychiatric/Behavioral: Negative.            Current Medications       Current Outpatient Medications:     ALPRAZolam (XANAX) 0.5 mg tablet, Take 1 tablet (0.5 mg total) by mouth 3 (three) times a day as needed (as neded) (Patient taking differently: Take 0.5 mg by mouth 3 (three) times a day as needed (as neded) Taking xanax once every few months.), Disp: 90 tablet, Rfl: 0    amoxicillin-clavulanate (AUGMENTIN) 875-125 mg per tablet, Take 1 tablet by mouth every 12 (twelve) hours for 10 days, Disp: 20 tablet, Rfl: 0    citalopram (CeleXA) 40 mg tablet, Take 1 tablet (40 mg total) by mouth daily, Disp: 90 tablet, Rfl: 1    omeprazole (PriLOSEC OTC) 20 MG tablet, Take 20 mg by mouth daily, Disp: , Rfl:     cloNIDine (CATAPRES) 0.1 mg tablet, take 1 tablet by mouth every evening at bedtime (Patient taking differently: Take 0.1 mg by mouth if needed), Disp: 30 tablet, Rfl: 0    ibuprofen (MOTRIN) 200 mg tablet, Take by mouth every 6 (six) hours as needed for mild pain, Disp: , Rfl:     lamoTRIgine (LaMICtal) 25 mg tablet, Take 2 tablets (50 mg total) by mouth daily (Patient taking differently: Take 25 mg by mouth daily), Disp: 60 tablet, Rfl: 0    liraglutide (SAXENDA) injection, Inject 0.1 mL (0.6 mg total) under the skin daily (Patient not taking: Reported on 7/10/2023), Disp: 15 mL, Rfl: 0    VITAMIN D PO, Take 1,000 Units by mouth daily, Disp: , Rfl:     Current Allergies     Allergies as of 11/29/2023 - Reviewed 11/29/2023   Allergen Reaction Noted    Pollen extract Allergic Rhinitis 11/13/2012            The following portions of the patient's history were reviewed and updated as appropriate: allergies, current medications, past family history, past medical history, past social history, past surgical history and problem list.     Past Medical History:   Diagnosis Date    Anxiety     Candidiasis of mouth     Last Assessed [id number]    Depression     Hypertension     Memory loss 4/7/2021    Obesity     Sciatica     Last Assessed [id number]    Skin rash     Last Assessed [id number]    Urticaria     Last Assessed [id number]       Past Surgical History:   Procedure Laterality Date    APPENDECTOMY LAPAROSCOPIC N/A 6/24/2023    Procedure: APPENDECTOMY LAPAROSCOPIC;  Surgeon: Macario Dunn DO;  Location: [facility];  Service: General    DENTAL SURGERY         Family History   Problem Relation Age of Onset    Anxiety disorder Mother     Depression Mother     Osteoporosis Mother     Testicular cancer Father     Cancer Father     Hypertension Father     Alcohol abuse Paternal Grandfather     Mental illness Paternal Grandmother     Cancer Family     Diabetes Family     Heart disease Family     Stroke Family         Stroke syndrome    Drug abuse Neg Hx     Completed Suicide  Neg Hx          Medications have been verified.         Objective   /94 (BP Location: Right arm, Patient Position: Sitting)   Pulse 73   Temp 97.6 °F (36.4 °C) (Temporal)   Resp 18   SpO2 95%   No LMP recorded. Patient is perimenopausal.       Physical Exam     Physical Exam  Vitals and nursing note reviewed. Constitutional:       General: She is not in acute distress. Appearance: She is well-developed and normal weight. She is ill-appearing. She is not toxic-appearing or diaphoretic. HENT:      Head: Normocephalic and atraumatic. Right Ear: A middle ear effusion is present. Tympanic membrane is erythematous. Left Ear: A middle ear effusion is present. Tympanic membrane is erythematous. Ears:      Comments: B/L red canals with swelling      Nose: Congestion present. No rhinorrhea. Mouth/Throat:      Mouth: Mucous membranes are moist. No oral lesions. Pharynx: Uvula midline. Posterior oropharyngeal erythema present. No oropharyngeal exudate. Tonsils: No tonsillar exudate or tonsillar abscesses. Eyes:      Conjunctiva/sclera: Conjunctivae normal.      Pupils: Pupils are equal, round, and reactive to light. Cardiovascular:      Rate and Rhythm: Normal rate and regular rhythm. Heart sounds: Normal heart sounds. No murmur heard. Pulmonary:      Effort: Pulmonary effort is normal. No respiratory distress. Breath sounds: Normal breath sounds. No stridor. No wheezing, rhonchi or rales. Chest:      Chest wall: No tenderness. Musculoskeletal:      Cervical back: Normal range of motion and neck supple. Lymphadenopathy:      Cervical: No cervical adenopathy. Skin:     General: Skin is warm and dry. Capillary Refill: Capillary refill takes less than 2 seconds. Neurological:      General: No focal deficit present. Mental Status: She is alert and oriented to person, place, and time.    Psychiatric:         Mood and Affect: Mood normal.         Behavior: Behavior normal.

## 2023-11-29 NOTE — PATIENT INSTRUCTIONS
Your strep A is negative. You have a throat culture pending. You are to download anfix for the results in 3-4 days. You will be notified if the results are + and an antibiotic will be called in for you. You are to do warm salt water gargles 4 x daily. Drink warm tea with honey and lemon. Take tylenol or motrin as able for pain or fever. Chloraseptic throat spray, cough drops. Do not share utensils. Change your tooth brush in 3 days. Follow up with your PCP in 2-3 days  Go to the ED if symptoms worsen      You have bilateral ear infections. You are prescribed Augmentin.     You may want to stop the dayquil and take sudafed and claritin if you are able to   Drink plenty of water

## 2023-12-02 DIAGNOSIS — F33.1 RECURRENT MAJOR DEPRESSIVE EPISODES, MODERATE (HCC): ICD-10-CM

## 2023-12-02 LAB — BACTERIA THROAT CULT: NORMAL

## 2023-12-04 RX ORDER — CITALOPRAM 40 MG/1
40 TABLET ORAL DAILY
Qty: 90 TABLET | Refills: 0 | Status: SHIPPED | OUTPATIENT
Start: 2023-12-04

## 2024-03-02 ENCOUNTER — HOSPITAL ENCOUNTER (EMERGENCY)
Facility: HOSPITAL | Age: 49
Discharge: HOME | End: 2024-03-02
Attending: EMERGENCY MEDICINE
Payer: COMMERCIAL

## 2024-03-02 VITALS
HEART RATE: 106 BPM | SYSTOLIC BLOOD PRESSURE: 153 MMHG | OXYGEN SATURATION: 96 % | RESPIRATION RATE: 16 BRPM | DIASTOLIC BLOOD PRESSURE: 84 MMHG

## 2024-03-02 DIAGNOSIS — E83.42 HYPOMAGNESEMIA: ICD-10-CM

## 2024-03-02 DIAGNOSIS — R03.0 ELEVATED BLOOD PRESSURE READING: ICD-10-CM

## 2024-03-02 DIAGNOSIS — R11.2 NAUSEA VOMITING AND DIARRHEA: Primary | ICD-10-CM

## 2024-03-02 DIAGNOSIS — R19.7 NAUSEA VOMITING AND DIARRHEA: Primary | ICD-10-CM

## 2024-03-02 LAB
ALBUMIN SERPL BCP-MCNC: 4.4 G/DL (ref 3.5–5)
ALP SERPL-CCNC: 71 U/L (ref 34–104)
ALT SERPL W P-5'-P-CCNC: 15 U/L (ref 7–52)
ANION GAP SERPL CALCULATED.3IONS-SCNC: 8 MMOL/L
AST SERPL W P-5'-P-CCNC: 14 U/L (ref 13–39)
BASOPHILS # BLD MANUAL: 0 THOUSAND/UL (ref 0–0.1)
BASOPHILS NFR MAR MANUAL: 0 % (ref 0–1)
BILIRUB SERPL-MCNC: 0.97 MG/DL (ref 0.2–1)
BUN SERPL-MCNC: 12 MG/DL (ref 5–25)
CALCIUM SERPL-MCNC: 9.2 MG/DL (ref 8.4–10.2)
CHLORIDE SERPL-SCNC: 104 MMOL/L (ref 96–108)
CO2 SERPL-SCNC: 22 MMOL/L (ref 21–32)
CREAT SERPL-MCNC: 0.64 MG/DL (ref 0.6–1.3)
EOSINOPHIL # BLD MANUAL: 0 THOUSAND/UL (ref 0–0.4)
EOSINOPHIL NFR BLD MANUAL: 0 % (ref 0–6)
ERYTHROCYTE [DISTWIDTH] IN BLOOD BY AUTOMATED COUNT: 11.9 % (ref 11.6–15.1)
GFR SERPL CREATININE-BSD FRML MDRD: 107 ML/MIN/1.73SQ M
GLUCOSE SERPL-MCNC: 132 MG/DL (ref 65–140)
HCT VFR BLD AUTO: 44.2 % (ref 34.8–46.1)
HGB BLD-MCNC: 14.8 G/DL (ref 11.5–15.4)
LIPASE SERPL-CCNC: 15 U/L (ref 11–82)
LYMPHOCYTES # BLD AUTO: 0.2 THOUSAND/UL (ref 0.6–4.47)
LYMPHOCYTES # BLD AUTO: 2 % (ref 14–44)
MAGNESIUM SERPL-MCNC: 1.7 MG/DL (ref 1.9–2.7)
MCH RBC QN AUTO: 31.3 PG (ref 26.8–34.3)
MCHC RBC AUTO-ENTMCNC: 33.5 G/DL (ref 31.4–37.4)
MCV RBC AUTO: 93 FL (ref 82–98)
MONOCYTES # BLD AUTO: 0.3 THOUSAND/UL (ref 0–1.22)
MONOCYTES NFR BLD: 3 % (ref 4–12)
NEUTROPHILS # BLD MANUAL: 9.41 THOUSAND/UL (ref 1.85–7.62)
NEUTS BAND NFR BLD MANUAL: 2 % (ref 0–8)
NEUTS SEG NFR BLD AUTO: 93 % (ref 43–75)
PLATELET # BLD AUTO: 334 THOUSANDS/UL (ref 149–390)
PLATELET BLD QL SMEAR: ADEQUATE
PMV BLD AUTO: 9.2 FL (ref 8.9–12.7)
POTASSIUM SERPL-SCNC: 3.9 MMOL/L (ref 3.5–5.3)
PROT SERPL-MCNC: 7.6 G/DL (ref 6.4–8.4)
RBC # BLD AUTO: 4.73 MILLION/UL (ref 3.81–5.12)
RBC MORPH BLD: NORMAL
SODIUM SERPL-SCNC: 134 MMOL/L (ref 135–147)
WBC # BLD AUTO: 9.91 THOUSAND/UL (ref 4.31–10.16)

## 2024-03-02 PROCEDURE — 99283 EMERGENCY DEPT VISIT LOW MDM: CPT

## 2024-03-02 PROCEDURE — 96361 HYDRATE IV INFUSION ADD-ON: CPT

## 2024-03-02 PROCEDURE — 96375 TX/PRO/DX INJ NEW DRUG ADDON: CPT

## 2024-03-02 PROCEDURE — 36415 COLL VENOUS BLD VENIPUNCTURE: CPT | Performed by: EMERGENCY MEDICINE

## 2024-03-02 PROCEDURE — 85027 COMPLETE CBC AUTOMATED: CPT | Performed by: EMERGENCY MEDICINE

## 2024-03-02 PROCEDURE — 83690 ASSAY OF LIPASE: CPT | Performed by: EMERGENCY MEDICINE

## 2024-03-02 PROCEDURE — 83735 ASSAY OF MAGNESIUM: CPT | Performed by: EMERGENCY MEDICINE

## 2024-03-02 PROCEDURE — 80053 COMPREHEN METABOLIC PANEL: CPT | Performed by: EMERGENCY MEDICINE

## 2024-03-02 PROCEDURE — 85007 BL SMEAR W/DIFF WBC COUNT: CPT | Performed by: EMERGENCY MEDICINE

## 2024-03-02 PROCEDURE — 96365 THER/PROPH/DIAG IV INF INIT: CPT

## 2024-03-02 RX ORDER — DROPERIDOL 2.5 MG/ML
0.62 INJECTION, SOLUTION INTRAMUSCULAR; INTRAVENOUS ONCE
Status: COMPLETED | OUTPATIENT
Start: 2024-03-02 | End: 2024-03-02

## 2024-03-02 RX ORDER — ONDANSETRON 2 MG/ML
4 INJECTION INTRAMUSCULAR; INTRAVENOUS ONCE
Status: COMPLETED | OUTPATIENT
Start: 2024-03-02 | End: 2024-03-02

## 2024-03-02 RX ORDER — MAGNESIUM SULFATE HEPTAHYDRATE 40 MG/ML
2 INJECTION, SOLUTION INTRAVENOUS ONCE
Status: COMPLETED | OUTPATIENT
Start: 2024-03-02 | End: 2024-03-02

## 2024-03-02 RX ORDER — DICYCLOMINE HCL 20 MG
20 TABLET ORAL 2 TIMES DAILY PRN
Qty: 10 TABLET | Refills: 0 | Status: SHIPPED | OUTPATIENT
Start: 2024-03-02

## 2024-03-02 RX ORDER — ONDANSETRON 4 MG/1
4 TABLET, ORALLY DISINTEGRATING ORAL EVERY 6 HOURS PRN
Qty: 21 TABLET | Refills: 0 | Status: SHIPPED | OUTPATIENT
Start: 2024-03-02

## 2024-03-02 RX ADMIN — ONDANSETRON 4 MG: 2 INJECTION INTRAMUSCULAR; INTRAVENOUS at 05:06

## 2024-03-02 RX ADMIN — MAGNESIUM SULFATE HEPTAHYDRATE 2 G: 40 INJECTION, SOLUTION INTRAVENOUS at 05:45

## 2024-03-02 RX ADMIN — SODIUM CHLORIDE 1000 ML: 0.9 INJECTION, SOLUTION INTRAVENOUS at 05:06

## 2024-03-02 RX ADMIN — DROPERIDOL 0.62 MG: 2.5 INJECTION, SOLUTION INTRAMUSCULAR; INTRAVENOUS at 06:24

## 2024-03-02 NOTE — ED PROVIDER NOTES
History  Chief Complaint   Patient presents with    Vomiting     Nausea, diarrhea; started around 1900 last night after eating calamari and a salad; thinks it might be food poisoning; stomach cramping     48-year-old female with a history of anxiety, depression, hypertension, appendectomy who presents to the emergency department for evaluation of nausea, vomiting, and diarrhea.  Symptoms started yesterday evening after she ate calamari and a salad.  Reports multiple episodes of nonbloody nonbilious emesis as well as multiple episodes of loose watery stool that is nonbloody in nature.  Symptoms are associated with some mild abdominal cramping.  No fevers or chills.  Was in her normal state of health prior to this, significant other ate similar items and had an episode of vomiting afterwards.        Prior to Admission Medications   Prescriptions Last Dose Informant Patient Reported? Taking?   ALPRAZolam (XANAX) 0.5 mg tablet   No No   Sig: Take 1 tablet (0.5 mg total) by mouth 3 (three) times a day as needed (as neded)   Patient taking differently: Take 0.5 mg by mouth 3 (three) times a day as needed (as neded) Taking xanax once every few months.   VITAMIN D PO  Self Yes No   Sig: Take 1,000 Units by mouth daily   citalopram (CeleXA) 40 mg tablet   No No   Sig: Take 1 tablet (40 mg total) by mouth daily   ibuprofen (MOTRIN) 200 mg tablet  Self Yes No   Sig: Take by mouth every 6 (six) hours as needed for mild pain   omeprazole (PriLOSEC OTC) 20 MG tablet  Self Yes No   Sig: Take 20 mg by mouth daily      Facility-Administered Medications: None       Past Medical History:   Diagnosis Date    Anxiety     Candidiasis of mouth     Last Assessed 21Apr2014    Depression     Hypertension     Memory loss 4/7/2021    Obesity     Sciatica     Last Assessed 04Aug2014    Skin rash     Last Assessed 19Mar2014    Urticaria     Last Assessed 21Mar2014       Past Surgical History:   Procedure Laterality Date    APPENDECTOMY  LAPAROSCOPIC N/A 6/24/2023    Procedure: APPENDECTOMY LAPAROSCOPIC;  Surgeon: Sanford Garcia DO;  Location: [facility];  Service: General    DENTAL SURGERY         Family History   Problem Relation Age of Onset    Anxiety disorder Mother     Depression Mother     Osteoporosis Mother     Testicular cancer Father     Cancer Father     Hypertension Father     Alcohol abuse Paternal Grandfather     Mental illness Paternal Grandmother     Cancer Family     Diabetes Family     Heart disease Family     Stroke Family         Stroke syndrome    Drug abuse Neg Hx     Completed Suicide  Neg Hx      I have reviewed and agree with the history as documented.    E-Cigarette/Vaping    E-Cigarette Use Never User      E-Cigarette/Vaping Substances    Nicotine No     THC No     CBD No     Flavoring No     Other No     Unknown No      Social History     Tobacco Use    Smoking status: Never     Passive exposure: Never    Smokeless tobacco: Never   Vaping Use    Vaping status: Never Used   Substance Use Topics    Alcohol use: Yes     Alcohol/week: 9.0 - 12.0 standard drinks of alcohol     Types: 9 - 12 Glasses of wine per week     Comment: off and on    Drug use: No       Review of Systems   Constitutional:  Negative for chills and fever.   HENT:  Negative for ear pain and sore throat.    Eyes:  Negative for pain and visual disturbance.   Respiratory:  Negative for cough and shortness of breath.    Cardiovascular:  Negative for chest pain and palpitations.   Gastrointestinal:  Positive for abdominal pain, diarrhea, nausea and vomiting.   Genitourinary:  Negative for dysuria and hematuria.   Musculoskeletal:  Negative for arthralgias and back pain.   Skin:  Negative for color change and rash.   Neurological:  Negative for seizures and syncope.   All other systems reviewed and are negative.      Physical Exam  Physical Exam  Vitals and nursing note reviewed.   Constitutional:       General: She is not in acute distress.     Appearance: She  is well-developed. She is ill-appearing.   HENT:      Head: Normocephalic and atraumatic.      Right Ear: External ear normal.      Left Ear: External ear normal.      Nose: Nose normal.      Mouth/Throat:      Mouth: Mucous membranes are dry.   Eyes:      General: No scleral icterus.     Extraocular Movements: Extraocular movements intact.      Conjunctiva/sclera: Conjunctivae normal.      Pupils: Pupils are equal, round, and reactive to light.   Cardiovascular:      Rate and Rhythm: Normal rate and regular rhythm.      Heart sounds: No murmur heard.  Pulmonary:      Effort: Pulmonary effort is normal. No respiratory distress.      Breath sounds: Normal breath sounds.   Abdominal:      General: Abdomen is flat. Bowel sounds are normal.      Palpations: Abdomen is soft.      Tenderness: There is no abdominal tenderness. There is no guarding or rebound.   Musculoskeletal:         General: No deformity. Normal range of motion.      Cervical back: Normal range of motion and neck supple.   Skin:     General: Skin is warm and dry.      Capillary Refill: Capillary refill takes less than 2 seconds.   Neurological:      General: No focal deficit present.      Mental Status: She is alert and oriented to person, place, and time.   Psychiatric:         Mood and Affect: Mood normal.         Behavior: Behavior normal.         Vital Signs  ED Triage Vitals   Temp Pulse Respirations Blood Pressure SpO2   -- 03/02/24 0500 03/02/24 0500 03/02/24 0500 03/02/24 0500    (!) 115 18 (!) 172/98 97 %      Temp src Heart Rate Source Patient Position - Orthostatic VS BP Location FiO2 (%)   -- 03/02/24 0500 03/02/24 0600 03/02/24 0500 --    Monitor Lying Left arm       Pain Score       03/02/24 0500       7           Vitals:    03/02/24 0500 03/02/24 0510 03/02/24 0530 03/02/24 0600   BP: (!) 172/98  146/67 151/85   Pulse: (!) 115 101 90 90   Patient Position - Orthostatic VS:    Lying         Visual Acuity      ED Medications  Medications    ondansetron (ZOFRAN) injection 4 mg (4 mg Intravenous Given 3/2/24 0506)   sodium chloride 0.9 % bolus 1,000 mL (0 mL Intravenous Stopped 3/2/24 0627)   magnesium sulfate 2 g/50 mL IVPB (premix) 2 g (0 g Intravenous Stopped 3/2/24 0619)   droperidol (INAPSINE) injection 0.625 mg (0.625 mg Intravenous Given 3/2/24 0624)       Diagnostic Studies  Results Reviewed       Procedure Component Value Units Date/Time    RBC Morphology Reflex Test [[id number]] Collected: 03/02/24 0504    Lab Status: Final result Specimen: Blood from Arm, Right Updated: 03/02/24 0601    CBC and differential [[id number]]  (Normal) Collected: 03/02/24 0504    Lab Status: Final result Specimen: Blood from Arm, Right Updated: 03/02/24 0544     WBC 9.91 Thousand/uL      RBC 4.73 Million/uL      Hemoglobin 14.8 g/dL      Hematocrit 44.2 %      MCV 93 fL      MCH 31.3 pg      MCHC 33.5 g/dL      RDW 11.9 %      MPV 9.2 fL      Platelets 334 Thousands/uL     Narrative:      This is an appended report.  These results have been appended to a previously verified report.    Manual Differential(PHLEBS Do Not Order) [[id number]]  (Abnormal) Collected: 03/02/24 0504    Lab Status: Final result Specimen: Blood from Arm, Right Updated: 03/02/24 0544     Segmented % 93 %      Bands % 2 %      Lymphocytes % 2 %      Monocytes % 3 %      Eosinophils, % 0 %      Basophils % 0 %      Absolute Neutrophils 9.41 Thousand/uL      Lymphocytes Absolute 0.20 Thousand/uL      Monocytes Absolute 0.30 Thousand/uL      Eosinophils Absolute 0.00 Thousand/uL      Basophils Absolute 0.00 Thousand/uL      Total Counted --     RBC Morphology Normal     Platelet Estimate Adequate    Comprehensive metabolic panel [[id number]]  (Abnormal) Collected: 03/02/24 0504    Lab Status: Final result Specimen: Blood from Arm, Right Updated: 03/02/24 0530     Sodium 134 mmol/L      Potassium 3.9 mmol/L      Chloride 104 mmol/L      CO2 22 mmol/L      ANION GAP 8 mmol/L      BUN 12 mg/dL       Creatinine 0.64 mg/dL      Glucose 132 mg/dL      Calcium 9.2 mg/dL      AST 14 U/L      ALT 15 U/L      Alkaline Phosphatase 71 U/L      Total Protein 7.6 g/dL      Albumin 4.4 g/dL      Total Bilirubin 0.97 mg/dL      eGFR 107 ml/min/1.73sq m     Narrative:      National Kidney Disease Foundation guidelines for Chronic Kidney Disease (CKD):     Stage 1 with normal or high GFR (GFR > 90 mL/min/1.73 square meters)    Stage 2 Mild CKD (GFR = 60-89 mL/min/1.73 square meters)    Stage 3A Moderate CKD (GFR = 45-59 mL/min/1.73 square meters)    Stage 3B Moderate CKD (GFR = 30-44 mL/min/1.73 square meters)    Stage 4 Severe CKD (GFR = 15-29 mL/min/1.73 square meters)    Stage 5 End Stage CKD (GFR <15 mL/min/1.73 square meters)  Note: GFR calculation is accurate only with a steady state creatinine    Lipase [[id number]]  (Normal) Collected: 03/02/24 0504    Lab Status: Final result Specimen: Blood from Arm, Right Updated: 03/02/24 0530     Lipase 15 u/L     Magnesium [[id number]]  (Abnormal) Collected: 03/02/24 0504    Lab Status: Final result Specimen: Blood from Arm, Right Updated: 03/02/24 0530     Magnesium 1.7 mg/dL                    No orders to display              Procedures  Procedures         ED Course  ED Course as of 03/02/24 0646   Sat Mar 02, 2024   0555 Blood Pressure: 146/67   0555 Pulse: 90   0555 Respirations: 17   0555 SpO2: 96 %   0620 Patient updated on laboratory findings, still complaining of nausea, will trial droperidol.                               SBIRT 20yo+      Flowsheet Row Most Recent Value   Initial Alcohol Screen: US AUDIT-C     1. How often do you have a drink containing alcohol? 0 Filed at: 03/02/2024 0554   2. How many drinks containing alcohol do you have on a typical day you are drinking?  0 Filed at: 03/02/2024 0554   3a. Male UNDER 65: How often do you have five or more drinks on one occasion? 0 Filed at: 03/02/2024 0554   3b. FEMALE Any Age, or MALE 65+: How often do you have 4 or  more drinks on one occassion? 0 Filed at: 03/02/2024 0554   Audit-C Score 0 Filed at: 03/02/2024 0554   ALBINA: How many times in the past year have you...    Used an illegal drug or used a prescription medication for non-medical reasons? Never Filed at: 03/02/2024 0554                      Medical Decision Making  48-year-old female presents emergency department for evaluation of abdominal pain, nausea, vomiting, and diarrhea after a meal.  On exam, she is ill-appearing, but in no acute distress, abdomen is soft, nontender nondistended without rebound or guarding.  Suspect symptoms likely secondary to foodborne illness, differential diagnosis also includes but is not limited to viral illness, pancreatitis, colitis.  Plan to check abdominal labs treat with IV fluids, Zofran and reassess.    Labs demonstrate hypomagnesemia, but are otherwise unremarkable.  Symptoms improved following medications, she is tolerating p.o.  Patient stable for discharge with prescription for Zofran.    Problems Addressed:  Elevated blood pressure reading: acute illness or injury  Hypomagnesemia: acute illness or injury  Nausea vomiting and diarrhea: acute illness or injury    Amount and/or Complexity of Data Reviewed  Independent Historian: nancy  External Data Reviewed: labs and notes.  Labs: ordered. Decision-making details documented in ED Course.    Risk  OTC drugs.  Prescription drug management.             Disposition  Final diagnoses:   Nausea vomiting and diarrhea   Hypomagnesemia   Elevated blood pressure reading     Time reflects when diagnosis was documented in both MDM as applicable and the Disposition within this note       Time User Action Codes Description Comment    3/2/2024  5:05 AM CheckMaldonado Add [R11.2,  R19.7] Nausea vomiting and diarrhea     3/2/2024  5:34 AM CheckMaldonado Add [E83.42] Hypomagnesemia     3/2/2024  5:35 AM CheckMaldonado [R03.0] Elevated blood pressure reading           ED Disposition       ED  Disposition   Discharge    Condition   Stable    Date/Time   Sat Mar 2, 2024 0535    Comment   Brandie Gloria discharge to home/self care.                   Follow-up Information       Follow up With Specialties Details Why Contact Info Additional Information    Evy Parnell PA-C Family Medicine, Internal Medicine, Physician Assistant Schedule an appointment as soon as possible for a visit in 2 days  [address] [address]  [phone]       [facility] Emergency Medicine  If symptoms worsen [address] [facility] [id number] [phone] [facility], [address] [address]            Patient's Medications   Discharge Prescriptions    ONDANSETRON (ZOFRAN-ODT) 4 MG DISINTEGRATING TABLET    Take 1 tablet (4 mg total) by mouth every 6 (six) hours as needed for nausea or vomiting for up to 21 doses       Start Date: 3/2/2024  End Date: --       Order Dose: 4 mg       Quantity: 21 tablet    Refills: 0       No discharge procedures on file.    PDMP Review         Value Time User    PDMP Reviewed  Yes 5/17/2023 11:39 AM William Fox MD            ED Provider  Electronically Signed by             Maldonado Verduzco MD  03/02/24 0642       Maldonado Verduzco MD  03/02/24 0646

## 2024-03-02 NOTE — Clinical Note
Brandie Gloria was seen and treated in our emergency department on 3/2/2024.                Diagnosis:     Brandie  may return to work on return date.    She may return on this date: 03/04/2024         If you have any questions or concerns, please don't hesitate to call.      Maldonado Verduzco MD    ______________________________           _______________          _______________  Hospital Representative                              Date                                Time

## 2024-03-02 NOTE — DISCHARGE INSTRUCTIONS
Take Zofran as needed for nausea and vomiting    Return for any worsening symptoms including worsening pain, uncontrollable vomiting, or any other concerning symptoms    Follow-up with primary care physician as needed

## 2024-03-05 ENCOUNTER — TELEPHONE (OUTPATIENT)
Dept: ENDOCRINOLOGY | Facility: CLINIC | Age: 49
End: 2024-03-05

## 2024-03-05 ENCOUNTER — OFFICE VISIT (OUTPATIENT)
Dept: INTERNAL MEDICINE CLINIC | Facility: CLINIC | Age: 49
End: 2024-03-05
Payer: COMMERCIAL

## 2024-03-05 VITALS
HEART RATE: 88 BPM | WEIGHT: 210.25 LBS | SYSTOLIC BLOOD PRESSURE: 112 MMHG | OXYGEN SATURATION: 98 % | TEMPERATURE: 97.8 F | BODY MASS INDEX: 37.25 KG/M2 | DIASTOLIC BLOOD PRESSURE: 84 MMHG | HEIGHT: 63 IN

## 2024-03-05 DIAGNOSIS — Z13.29 SCREENING FOR THYROID DISORDER: ICD-10-CM

## 2024-03-05 DIAGNOSIS — Z13.0 SCREENING FOR DEFICIENCY ANEMIA: ICD-10-CM

## 2024-03-05 DIAGNOSIS — E55.9 VITAMIN D DEFICIENCY: ICD-10-CM

## 2024-03-05 DIAGNOSIS — F39 MOOD DISORDER (HCC): ICD-10-CM

## 2024-03-05 DIAGNOSIS — Z00.00 WELL ADULT EXAM: Primary | ICD-10-CM

## 2024-03-05 DIAGNOSIS — Z12.11 SCREENING FOR COLON CANCER: ICD-10-CM

## 2024-03-05 DIAGNOSIS — F33.41 RECURRENT MAJOR DEPRESSIVE DISORDER, IN PARTIAL REMISSION (HCC): ICD-10-CM

## 2024-03-05 DIAGNOSIS — Z11.4 SCREENING FOR HIV (HUMAN IMMUNODEFICIENCY VIRUS): ICD-10-CM

## 2024-03-05 DIAGNOSIS — Z12.31 ENCOUNTER FOR SCREENING MAMMOGRAM FOR BREAST CANCER: ICD-10-CM

## 2024-03-05 DIAGNOSIS — Z23 ENCOUNTER FOR IMMUNIZATION: ICD-10-CM

## 2024-03-05 DIAGNOSIS — Z13.1 SCREENING FOR DIABETES MELLITUS: ICD-10-CM

## 2024-03-05 DIAGNOSIS — E78.2 MIXED HYPERLIPIDEMIA: ICD-10-CM

## 2024-03-05 DIAGNOSIS — Z12.4 SCREENING FOR CERVICAL CANCER: ICD-10-CM

## 2024-03-05 DIAGNOSIS — Z11.59 NEED FOR HEPATITIS C SCREENING TEST: ICD-10-CM

## 2024-03-05 PROCEDURE — 99396 PREV VISIT EST AGE 40-64: CPT | Performed by: PHYSICIAN ASSISTANT

## 2024-03-06 ENCOUNTER — TELEPHONE (OUTPATIENT)
Dept: INTERNAL MEDICINE CLINIC | Facility: CLINIC | Age: 49
End: 2024-03-06

## 2024-03-06 PROBLEM — R41.3 MEMORY LOSS: Status: RESOLVED | Noted: 2021-04-07 | Resolved: 2024-03-06

## 2024-03-06 PROBLEM — K35.30 ACUTE APPENDICITIS WITH LOCALIZED PERITONITIS, WITHOUT PERFORATION, ABSCESS, OR GANGRENE: Status: RESOLVED | Noted: 2023-06-24 | Resolved: 2024-03-06

## 2024-03-06 PROBLEM — R26.89 BALANCE DISORDER: Status: RESOLVED | Noted: 2021-04-07 | Resolved: 2024-03-06

## 2024-03-06 PROBLEM — R00.2 PALPITATIONS: Status: RESOLVED | Noted: 2022-08-04 | Resolved: 2024-03-06

## 2024-03-06 PROBLEM — M54.2 NECK PAIN: Status: RESOLVED | Noted: 2021-04-07 | Resolved: 2024-03-06

## 2024-03-06 RX ORDER — LAMOTRIGINE 25 MG/1
25 TABLET ORAL DAILY
Qty: 30 TABLET | Refills: 2 | Status: SHIPPED | OUTPATIENT
Start: 2024-03-06

## 2024-03-06 RX ORDER — ALPRAZOLAM 0.5 MG/1
0.5 TABLET ORAL 3 TIMES DAILY PRN
Qty: 90 TABLET | Refills: 0 | Status: SHIPPED | OUTPATIENT
Start: 2024-03-06

## 2024-03-06 RX ORDER — CLONIDINE HYDROCHLORIDE 0.1 MG/1
0.1 TABLET ORAL ONCE
Start: 2024-03-06 | End: 2024-03-06

## 2024-03-06 NOTE — TELEPHONE ENCOUNTER
Christianm. I called to ask patient if she did a colonoscopy and if no, if she prefers a Gastroenterologist or Cologuard

## 2024-03-06 NOTE — PROGRESS NOTES
ADULT ANNUAL PHYSICAL  [facility]    NAME: Brandie Gloria  AGE: 48 y.o. SEX: female  : 1975     DATE: 3/6/2024     Assessment and Plan:     Problem List Items Addressed This Visit       Hyperlipidemia    Relevant Orders    Lipid panel     Other Visit Diagnoses       Need for hepatitis C screening test    -  Primary    Relevant Orders    Hepatitis C Antibody    Screening for HIV (human immunodeficiency virus)        Relevant Orders    HIV 1/2 AG/AB w Reflex [facility] for 2 yr old and above    Screening for cervical cancer        Relevant Orders    Ambulatory Referral to Obstetrics / Gynecology    Encounter for screening mammogram for breast cancer        Relevant Orders    Mammo screening bilateral w 3d & cad    Encounter for immunization        Relevant Orders    TDAP VACCINE GREATER THAN OR EQUAL TO 6YO IM    Screening for colon cancer        Relevant Orders    Ambulatory referral to Gastroenterology    Screening for deficiency anemia        Relevant Orders    CBC and differential    Screening for thyroid disorder        Relevant Orders    TSH, 3rd generation with Free T4 reflex    Vitamin D deficiency        Relevant Orders    Vitamin D 25 hydroxy    Screening for diabetes mellitus        Relevant Orders    Comprehensive metabolic panel    Hemoglobin A1C            Immunizations and preventive care screenings were discussed with patient today. Appropriate education was printed on patient's after visit summary.    Counseling:  Due for mammogram and CRC Screening. TDAP given      Depression Screening and Follow-up Plan: Patient was screened for depression during today's encounter. They screened negative with a PHQ-9 score of 0.        No follow-ups on file.     Chief Complaint:     Chief Complaint   Patient presents with    Physical Exam      History of Present Illness:     Adult Annual Physical   Patient here for a comprehensive physical exam. The  patient reports no problems.    Diet and Physical Activity  Diet/Nutrition: well balanced diet.   Exercise: no formal exercise.      Depression Screening  PHQ-2/9 Depression Screening    Little interest or pleasure in doing things: 0 - not at all  Feeling down, depressed, or hopeless: 0 - not at all  Trouble falling or staying asleep, or sleeping too much: 0 - not at all  Feeling tired or having little energy: 0 - not at all  Poor appetite or overeatin - not at all  Feeling bad about yourself - or that you are a failure or have let yourself or your family down: 0 - not at all  Trouble concentrating on things, such as reading the newspaper or watching television: 0 - not at all  Moving or speaking so slowly that other people could have noticed. Or the opposite - being so fidgety or restless that you have been moving around a lot more than usual: 0 - not at all  Thoughts that you would be better off dead, or of hurting yourself in some way: 0 - not at all  PHQ-9 Score: 0  PHQ-9 Interpretation: No or Minimal depression       General Health  Sleep: sleeps well.   Hearing: normal - bilateral.  Vision: no vision problems.   Dental: regular dental visits.       /GYN Health  Follows with gynecology? yes       Advanced Care Planning  Do you have an advanced directive? no  Do you have a durable medical power of ? no  ACP document given to the patient? no     Review of Systems:     Review of Systems   Constitutional:  Negative for chills and fever.   HENT:  Negative for congestion, ear pain, hearing loss, postnasal drip, rhinorrhea, sinus pressure, sinus pain, sore throat and trouble swallowing.    Eyes:  Negative for pain and visual disturbance.   Respiratory:  Negative for cough, chest tightness, shortness of breath and wheezing.    Cardiovascular: Negative.  Negative for chest pain, palpitations and leg swelling.   Gastrointestinal:  Negative for abdominal pain, blood in stool, constipation, diarrhea, nausea  and vomiting.   Endocrine: Negative for cold intolerance, heat intolerance, polydipsia, polyphagia and polyuria.   Genitourinary:  Negative for difficulty urinating, dysuria, flank pain and urgency.   Musculoskeletal:  Negative for arthralgias, back pain, gait problem and myalgias.   Skin:  Negative for rash.   Allergic/Immunologic: Negative.    Neurological:  Negative for dizziness, weakness, light-headedness and headaches.   Hematological: Negative.    Psychiatric/Behavioral:  Negative for behavioral problems, dysphoric mood and sleep disturbance. The patient is not nervous/anxious.       Past Medical History:     Past Medical History:   Diagnosis Date    Anxiety     Candidiasis of mouth     Last Assessed 21Apr2014    Depression     Hypertension     Memory loss 4/7/2021    Obesity     Sciatica     Last Assessed 04Aug2014    Skin rash     Last Assessed 19Mar2014    Urticaria     Last Assessed 21Mar2014      Past Surgical History:     Past Surgical History:   Procedure Laterality Date    APPENDECTOMY LAPAROSCOPIC N/A 6/24/2023    Procedure: APPENDECTOMY LAPAROSCOPIC;  Surgeon: Sanford Garcia DO;  Location: [facility];  Service: General    DENTAL SURGERY        Social History:     Social History     Socioeconomic History    Marital status: /Civil Union     Spouse name: None    Number of children: 0    Years of education: None    Highest education level: Bachelor's degree (e.g., BA, AB, BS)   Occupational History    Occupation: Visiting RN     [employer]er: [employer]   Tobacco Use    Smoking status: Never     Passive exposure: Never    Smokeless tobacco: Never   Vaping Use    Vaping status: Never Used   Substance and Sexual Activity    Alcohol use: Yes     Alcohol/week: 9.0 - 12.0 standard drinks of alcohol     Types: 9 - 12 Glasses of wine per week     Comment: off and on    Drug use: No    Sexual activity: Yes   Other Topics Concern    None   Social History Narrative        Employed -   [employer] Full Time    Lives with spouse     Caffeine use    Uses safety equipment: seat belts     Social Determinants of Health     Financial Resource Strain: Not on file   Food Insecurity: Not on file   Transportation Needs: Not on file   Physical Activity: Not on file   Stress: Not on file   Social Connections: Not on file   Intimate Partner Violence: Not on file   Housing Stability: Not on file      Family History:     Family History   Problem Relation Age of Onset    Anxiety disorder Mother     Depression Mother     Osteoporosis Mother     Testicular cancer Father     Cancer Father     Hypertension Father     Alcohol abuse Paternal Grandfather     Mental illness Paternal Grandmother     Cancer Family     Diabetes Family     Heart disease Family     Stroke Family         Stroke syndrome    Drug abuse Neg Hx     Completed Suicide  Neg Hx       Current Medications:     Current Outpatient Medications   Medication Sig Dispense Refill    ALPRAZolam (XANAX) 0.5 mg tablet Take 1 tablet (0.5 mg total) by mouth 3 (three) times a day as needed (as neded) (Patient taking differently: Take 0.5 mg by mouth 3 (three) times a day as needed (as neded) Taking xanax once every few months.) 90 tablet 0    citalopram (CeleXA) 40 mg tablet Take 1 tablet (40 mg total) by mouth daily 90 tablet 0    dicyclomine (BENTYL) 20 mg tablet Take 1 tablet (20 mg total) by mouth 2 (two) times a day as needed (abd cramps) for up to 10 doses 10 tablet 0    ibuprofen (MOTRIN) 200 mg tablet Take by mouth every 6 (six) hours as needed for mild pain      omeprazole (PriLOSEC OTC) 20 MG tablet Take 20 mg by mouth daily      ondansetron (ZOFRAN-ODT) 4 mg disintegrating tablet Take 1 tablet (4 mg total) by mouth every 6 (six) hours as needed for nausea or vomiting for up to 21 doses 21 tablet 0    VITAMIN D PO Take 1,000 Units by mouth daily       No current facility-administered medications for this visit.      Allergies:     Allergies   Allergen Reactions  "   Pollen Extract Allergic Rhinitis      Physical Exam:     /84   Pulse 88   Temp 97.8 °F (36.6 °C)   Ht 5' 3\" (1.6 m)   Wt 95.4 kg (210 lb 4 oz)   SpO2 98%   BMI 37.24 kg/m²     Physical Exam  Vitals and nursing note reviewed.   Constitutional:       Appearance: She is well-developed. She is obese.   HENT:      Head: Normocephalic and atraumatic.      Right Ear: External ear normal.      Left Ear: External ear normal.      Nose: Nose normal.   Eyes:      Conjunctiva/sclera: Conjunctivae normal.   Cardiovascular:      Rate and Rhythm: Normal rate and regular rhythm.      Heart sounds: Normal heart sounds.   Pulmonary:      Effort: Pulmonary effort is normal.      Breath sounds: Normal breath sounds.   Abdominal:      General: Bowel sounds are normal.      Palpations: Abdomen is soft.   Musculoskeletal:         General: Normal range of motion.      Cervical back: Normal range of motion and neck supple.   Skin:     General: Skin is warm and dry.   Neurological:      Mental Status: She is alert and oriented to person, place, and time.   Psychiatric:         Behavior: Behavior normal.         Thought Content: Thought content normal.         Judgment: Judgment normal.          Evy Parnell PA-C  [facility]    "

## 2024-03-20 ENCOUNTER — CONSULT (OUTPATIENT)
Age: 49
End: 2024-03-20
Payer: COMMERCIAL

## 2024-03-20 VITALS
HEIGHT: 63 IN | SYSTOLIC BLOOD PRESSURE: 124 MMHG | OXYGEN SATURATION: 98 % | BODY MASS INDEX: 37.81 KG/M2 | DIASTOLIC BLOOD PRESSURE: 74 MMHG | HEART RATE: 90 BPM | TEMPERATURE: 97.1 F | WEIGHT: 213.4 LBS

## 2024-03-20 DIAGNOSIS — Z12.11 SCREENING FOR COLON CANCER: Primary | ICD-10-CM

## 2024-03-20 DIAGNOSIS — R10.9 ABDOMINAL CRAMPING: ICD-10-CM

## 2024-03-20 DIAGNOSIS — K21.9 GASTROESOPHAGEAL REFLUX DISEASE, UNSPECIFIED WHETHER ESOPHAGITIS PRESENT: ICD-10-CM

## 2024-03-20 DIAGNOSIS — R19.5 LOOSE STOOLS: ICD-10-CM

## 2024-03-20 PROCEDURE — 99244 OFF/OP CNSLTJ NEW/EST MOD 40: CPT | Performed by: STUDENT IN AN ORGANIZED HEALTH CARE EDUCATION/TRAINING PROGRAM

## 2024-03-20 RX ORDER — SODIUM, POTASSIUM,MAG SULFATES 17.5-3.13G
177 SOLUTION, RECONSTITUTED, ORAL ORAL ONCE
Qty: 354 ML | Refills: 0 | Status: SHIPPED | OUTPATIENT
Start: 2024-03-20 | End: 2024-03-20

## 2024-03-20 NOTE — PATIENT INSTRUCTIONS
We will schedule you for upper endoscopy (EGD) and colonoscopy  Please follow the instructions for the bowel prep

## 2024-03-20 NOTE — ASSESSMENT & PLAN NOTE
Occurs with certain dietary indiscretion particularly with fried or fatty foods and consumption of large amount of food at once.  Associated with abdominal cramping.  Possible dietary sensitivity versus IBS.  No other alarming symptoms.  Has normal bowel habits as long as she avoids certain trigger food items.    No indication for further stool tests or evaluation  Plan for upper endoscopy and colonoscopy as noted for GERD and colon cancer screening and we will obtain biopsies if necessary for any abnormal endoscopic findings

## 2024-03-20 NOTE — ASSESSMENT & PLAN NOTE
Due for colon cancer screening.  No alarming symptoms.  Denies any family history of colon cancer.  No antithrombotics or anticoagulants.  Average risk screening.    We will schedule for screening colonoscopy  Suprep bowel prep

## 2024-03-20 NOTE — PROGRESS NOTES
[facility]  Outpatient Consultation  Encounter: [id number]     PATIENT INFO     Name: Brandie Gloria  YOB: 1975   Age: 48 y.o.   Sex: female   MRN: [id number]    ASSESSMENT & PLAN     Brandie Gloria is a 48 y.o. female with history of JOE/MDD, hyperlipidemia, obesity, hypertension, and GERD presents to GI office for evaluation of chronic GERD and need for colon cancer screening.    Problem List Items Addressed This Visit       Screening for colon cancer - Primary     Due for colon cancer screening.  No alarming symptoms.  Denies any family history of colon cancer.  No antithrombotics or anticoagulants.  Average risk screening.    We will schedule for screening colonoscopy  Suprep bowel prep         Relevant Medications    Na Sulfate-K Sulfate-Mg Sulf (Suprep Bowel Prep Kit) 17.5-3.13-1.6 GM/177ML SOLN    Other Relevant Orders    Colonoscopy    Abdominal cramping     Mild intermittent cramping which appears to be related to certain dietary triggers.  Does not appear to be fully consistent with IBS although she does have resolution of symptoms with bowel movement.  Trigger foods include fatty foods, fried foods, and if consuming a large amount of food at any 1 time.  Fortunately, no other alarming symptoms and as long as she practices dietary discretion, she does not have any significant abdominal pain.  Low suspicion for any other organic GI pathology such as IBD.  She did have a recent bout of food poisoning for which she was prescribed Bentyl and Zofran.  Fortunately, her symptoms have resolved and she has not required use of these medications.    Encourage dietary discretion and lifestyle measures to avoid exacerbations  Discontinued Bentyl and Zofran as she is no longer requiring these medications  Will defer any additional workup at this time         Loose stools     Occurs with certain dietary indiscretion particularly with fried or fatty foods and consumption  of large amount of food at once.  Associated with abdominal cramping.  Possible dietary sensitivity versus IBS.  No other alarming symptoms.  Has normal bowel habits as long as she avoids certain trigger food items.    No indication for further stool tests or evaluation  Plan for upper endoscopy and colonoscopy as noted for GERD and colon cancer screening and we will obtain biopsies if necessary for any abnormal endoscopic findings         GERD (gastroesophageal reflux disease)     Long history of reflux for over 15 years.  Uses OTC omeprazole 20 mg only as needed.  Does not have any significant symptoms unless she consumes certain trigger foods or with other dietary indiscretion.  She could have underlying hiatal hernia which could also be contributing.  Low suspicion for peptic ulcer disease or gastritis or esophagitis or gastroparesis.    We will schedule for upper endoscopy for evaluation for the cause for her chronic reflux symptoms as well as Mello's screening  Continue omeprazole 20 mg as needed for now  Dietary discretion and lifestyle changes to minimize episodes         Relevant Orders    EGD     Orders Placed This Encounter   Procedures    EGD    Colonoscopy       FOLLOW-UP: As needed    HISTORY OF PRESENT ILLNESS       Brandie Gloria is a 48 y.o. female who presents to GI office for need for colon cancer screening and evaluation of chronic GERD symptoms.  Patient is new to this office.  Patient was referred by her PCP, Evy Parnell PA-C.  Patient is employed by [employer]    Reports longstanding history of GERD for over 15 years.  Her symptoms have been relatively mild and well-controlled with omeprazole OTC 20 mg as needed.  She has never had upper endoscopy.  She typically does not experience any symptoms unless she eats certain trigger foods such as fried foods, fatty foods, or consumes a large amount of food and 1 meal.  She denies any dysphagia, odynophagia, nausea, vomiting.  She does have  a prescription for ibuprofen but only takes this as needed which is relatively infrequent.    She is also due for colon cancer screening.  Denies any family history of colon cancer.  Denies any alarm symptoms including hematochezia, melena, change in bowel habits, unintentional weight loss.  Not on antithrombotics or anticoagulants.  She does report episodic abdominal cramping primarily in the lower abdomen with associated loose stools but these episodes only occur with dietary indiscretion.  Similar to foods which may exacerbate her reflux symptoms, if she eats fried foods, fatty foods, or consumes a large amount of food and 1 meal, she may experience some lower abdominal cramping and urgency as well as loose stools.  Her abdominal cramping typically is mild and resolves with bowel movement.  If she does not consume these trigger foods, she does not experience any symptoms and has normal formed stools.  Denies any family history of Crohn's disease, ulcerative colitis, or celiac sprue.     ENDOSCOPIC HISTORY     UPPER ENDOSCOPY: None    COLONOSCOPY: None    REVIEW OF SYSTEMS     CONSTITUTIONAL: Denies any fever, chills, rigors, and weight loss  HEENT: No earache or tinnitus, denies hearing loss or visual disturbances  CARDIOVASCULAR: No chest pain or palpitations  RESPIRATORY: Denies any cough, hemoptysis, shortness of breath or dyspnea on exertion  GASTROINTESTINAL: As noted in the History of Present Illness  GENITOURINARY: No problems with urination, denies any hematuria or dysuria  NEUROLOGIC: No dizziness or vertigo, denies headaches   MUSCULOSKELETAL: Denies any muscle pain   SKIN: Denies jaundice or itching  PSYCHOSOCIAL: Denies any recent memory loss     Answers submitted by the patient for this visit:  Abdominal Pain Questionnaire (Submitted on 3/20/2024)  Chief Complaint: Abdominal pain  Chronicity: recurrent  Onset: more than 1 year ago  Onset quality: undetermined  Frequency: intermittently  Episode  duration: 1 Hours  Progression since onset: unchanged  Pain location: suprapubic region  Pain - numeric: 1/10  Pain quality: cramping  Radiates to: does not radiate  anorexia: No  arthralgias: No  belching: No  constipation: No  diarrhea: No  dysuria: No  fever: No  flatus: Yes  frequency: No  headaches: No  hematochezia: No  hematuria: No  melena: No  myalgias: No  nausea: No  weight loss: No  vomiting: No  Aggravated by: bowel movement  Relieved by: nothing    Historical Information   Past Medical History:   Diagnosis Date    Anxiety     Candidiasis of mouth     Last Assessed 21Apr2014    Depression     GERD (gastroesophageal reflux disease) 3/20/2024    Hypertension     Memory loss 4/7/2021    Obesity     Sciatica     Last Assessed 04Aug2014    Skin rash     Last Assessed 19Mar2014    Urticaria     Last Assessed 21Mar2014     Past Surgical History:   Procedure Laterality Date    APPENDECTOMY LAPAROSCOPIC N/A 6/24/2023    Procedure: APPENDECTOMY LAPAROSCOPIC;  Surgeon: Sanford Garcia DO;  Location: [facility];  Service: General    DENTAL SURGERY       Social History   Social History     Substance and Sexual Activity   Alcohol Use Yes    Alcohol/week: 9.0 - 12.0 standard drinks of alcohol    Types: 9 - 12 Glasses of wine per week    Comment: off and on     Social History     Substance and Sexual Activity   Drug Use No     Social History     Tobacco Use   Smoking Status Never    Passive exposure: Never   Smokeless Tobacco Never     Family History   Problem Relation Age of Onset    Anxiety disorder Mother     Depression Mother     Osteoporosis Mother     Testicular cancer Father     Cancer Father     Hypertension Father     Alcohol abuse Paternal Grandfather     Mental illness Paternal Grandmother     Cancer Family     Diabetes Family     Heart disease Family     Stroke Family         Stroke syndrome    Drug abuse Neg Hx     Completed Suicide  Neg Hx        MEDICATIONS & ALLERGIES     Current Outpatient Medications  "  Medication Instructions    ALPRAZolam (XANAX) 0.5 mg, Oral, 3 times daily PRN    citalopram (CELEXA) 40 mg, Oral, Daily    ibuprofen (MOTRIN) 200 mg tablet Oral, Every 6 hours PRN    Na Sulfate-K Sulfate-Mg Sulf (Suprep Bowel Prep Kit) 17.5-3.13-1.6 GM/177ML SOLN 177 mL, Oral, Once, Take 177 mL by mouth once for 1 dose    omeprazole (PRILOSEC OTC) 20 mg, Oral, Daily    VITAMIN D PO 1,000 Units, Oral, Daily     Allergies   Allergen Reactions    Pollen Extract Allergic Rhinitis       PHYSICAL EXAM      Objective   Blood pressure 124/74, pulse 90, temperature (!) 97.1 °F (36.2 °C), temperature source Temporal, height 5' 3\" (1.6 m), weight 96.8 kg (213 lb 6.4 oz), SpO2 98%. Body mass index is 37.8 kg/m².    General Appearance:   Alert, cooperative, no distress   HEENT:   Normocephalic, atraumatic, anicteric     Neck:   Supple, symmetrical, trachea midline   Lungs:   Equal chest rise, respirations unlabored    Heart:   Regular rate   Abdomen:   Soft, non-tender, non-distended; normal bowel sounds; no masses, no organomegaly    Rectal:   Deferred    Extremities:   No cyanosis or edema    Neuro:   Moves all 4 extremities    Skin:   No jaundice, rashes, or lesions       LABORATORY RESULTS     No visits with results within 1 Day(s) from this visit.   Latest known visit with results is:   Admission on 03/02/2024, Discharged on 03/02/2024   Component Date Value    WBC 03/02/2024 9.91     RBC 03/02/2024 4.73     Hemoglobin 03/02/2024 14.8     Hematocrit 03/02/2024 44.2     MCV 03/02/2024 93     MCH 03/02/2024 31.3     MCHC 03/02/2024 33.5     RDW 03/02/2024 11.9     MPV 03/02/2024 9.2     Platelets 03/02/2024 334     Sodium 03/02/2024 134 (L)     Potassium 03/02/2024 3.9     Chloride 03/02/2024 104     CO2 03/02/2024 22     ANION GAP 03/02/2024 8     BUN 03/02/2024 12     Creatinine 03/02/2024 0.64     Glucose 03/02/2024 132     Calcium 03/02/2024 9.2     AST 03/02/2024 14     ALT 03/02/2024 15     Alkaline Phosphatase " 03/02/2024 71     Total Protein 03/02/2024 7.6     Albumin 03/02/2024 4.4     Total Bilirubin 03/02/2024 0.97     eGFR 03/02/2024 107     Lipase 03/02/2024 15     Magnesium 03/02/2024 1.7 (L)     Segmented % 03/02/2024 93 (H)     Bands % 03/02/2024 2     Lymphocytes % 03/02/2024 2 (L)     Monocytes % 03/02/2024 3 (L)     Eosinophils % 03/02/2024 0     Basophils % 03/02/2024 0     Absolute Neutrophils 03/02/2024 9.41 (H)     Absolute Lymphocytes 03/02/2024 0.20 (L)     Absolute Monocytes 03/02/2024 0.30     Absolute Eosinophils 03/02/2024 0.00     Absolute Basophils 03/02/2024 0.00     RBC Morphology 03/02/2024 Normal     Platelet Estimate 03/02/2024 Adequate         IMAGING RESULTS     No results found.  I have personally reviewed any available and pertinent imaging study reports.      Ramone Weeks D.O.  [facility]  Division of Gastroenterology & Hepatology  Available on Tiger[employer]  [email or web address]    ** Please Note: This note is constructed using a voice recognition dictation system. **

## 2024-03-20 NOTE — ASSESSMENT & PLAN NOTE
Mild intermittent cramping which appears to be related to certain dietary triggers.  Does not appear to be fully consistent with IBS although she does have resolution of symptoms with bowel movement.  Trigger foods include fatty foods, fried foods, and if consuming a large amount of food at any 1 time.  Fortunately, no other alarming symptoms and as long as she practices dietary discretion, she does not have any significant abdominal pain.  Low suspicion for any other organic GI pathology such as IBD.  She did have a recent bout of food poisoning for which she was prescribed Bentyl and Zofran.  Fortunately, her symptoms have resolved and she has not required use of these medications.    Encourage dietary discretion and lifestyle measures to avoid exacerbations  Discontinued Bentyl and Zofran as she is no longer requiring these medications  Will defer any additional workup at this time

## 2024-03-20 NOTE — ASSESSMENT & PLAN NOTE
Long history of reflux for over 15 years.  Uses OTC omeprazole 20 mg only as needed.  Does not have any significant symptoms unless she consumes certain trigger foods or with other dietary indiscretion.  She could have underlying hiatal hernia which could also be contributing.  Low suspicion for peptic ulcer disease or gastritis or esophagitis or gastroparesis.    We will schedule for upper endoscopy for evaluation for the cause for her chronic reflux symptoms as well as Mello's screening  Continue omeprazole 20 mg as needed for now  Dietary discretion and lifestyle changes to minimize episodes

## 2024-05-01 PROBLEM — Z12.11 SCREENING FOR COLON CANCER: Status: RESOLVED | Noted: 2024-03-20 | Resolved: 2024-05-01

## 2024-05-14 ENCOUNTER — ANESTHESIA EVENT (OUTPATIENT)
Dept: GASTROENTEROLOGY | Facility: HOSPITAL | Age: 49
End: 2024-05-14

## 2024-05-14 ENCOUNTER — HOSPITAL ENCOUNTER (OUTPATIENT)
Dept: GASTROENTEROLOGY | Facility: HOSPITAL | Age: 49
Setting detail: OUTPATIENT SURGERY
Discharge: HOME | End: 2024-05-14
Attending: STUDENT IN AN ORGANIZED HEALTH CARE EDUCATION/TRAINING PROGRAM
Payer: COMMERCIAL

## 2024-05-14 ENCOUNTER — ANESTHESIA (OUTPATIENT)
Dept: GASTROENTEROLOGY | Facility: HOSPITAL | Age: 49
End: 2024-05-14

## 2024-05-14 VITALS
HEART RATE: 71 BPM | DIASTOLIC BLOOD PRESSURE: 65 MMHG | OXYGEN SATURATION: 99 % | TEMPERATURE: 98.1 F | RESPIRATION RATE: 16 BRPM | SYSTOLIC BLOOD PRESSURE: 117 MMHG

## 2024-05-14 DIAGNOSIS — K21.9 GASTROESOPHAGEAL REFLUX DISEASE, UNSPECIFIED WHETHER ESOPHAGITIS PRESENT: ICD-10-CM

## 2024-05-14 DIAGNOSIS — Z12.11 SCREENING FOR COLON CANCER: ICD-10-CM

## 2024-05-14 LAB — HCG SERPL QL: NEGATIVE

## 2024-05-14 PROCEDURE — 45385 COLONOSCOPY W/LESION REMOVAL: CPT | Performed by: STUDENT IN AN ORGANIZED HEALTH CARE EDUCATION/TRAINING PROGRAM

## 2024-05-14 PROCEDURE — 84703 CHORIONIC GONADOTROPIN ASSAY: CPT | Performed by: GENERAL PRACTICE

## 2024-05-14 PROCEDURE — 88305 TISSUE EXAM BY PATHOLOGIST: CPT | Performed by: PATHOLOGY

## 2024-05-14 PROCEDURE — 45380 COLONOSCOPY AND BIOPSY: CPT | Performed by: STUDENT IN AN ORGANIZED HEALTH CARE EDUCATION/TRAINING PROGRAM

## 2024-05-14 PROCEDURE — 43239 EGD BIOPSY SINGLE/MULTIPLE: CPT | Performed by: STUDENT IN AN ORGANIZED HEALTH CARE EDUCATION/TRAINING PROGRAM

## 2024-05-14 RX ORDER — GLYCOPYRROLATE 0.2 MG/ML
INJECTION INTRAMUSCULAR; INTRAVENOUS AS NEEDED
Status: DISCONTINUED | OUTPATIENT
Start: 2024-05-14 | End: 2024-05-14

## 2024-05-14 RX ORDER — PROPOFOL 10 MG/ML
INJECTION, EMULSION INTRAVENOUS AS NEEDED
Status: DISCONTINUED | OUTPATIENT
Start: 2024-05-14 | End: 2024-05-14

## 2024-05-14 RX ORDER — SODIUM CHLORIDE, SODIUM LACTATE, POTASSIUM CHLORIDE, CALCIUM CHLORIDE 600; 310; 30; 20 MG/100ML; MG/100ML; MG/100ML; MG/100ML
125 INJECTION, SOLUTION INTRAVENOUS CONTINUOUS
Status: DISCONTINUED | OUTPATIENT
Start: 2024-05-14 | End: 2024-05-18 | Stop reason: HOSPADM

## 2024-05-14 RX ORDER — LIDOCAINE HYDROCHLORIDE 20 MG/ML
INJECTION, SOLUTION EPIDURAL; INFILTRATION; INTRACAUDAL; PERINEURAL AS NEEDED
Status: DISCONTINUED | OUTPATIENT
Start: 2024-05-14 | End: 2024-05-14

## 2024-05-14 RX ADMIN — PROPOFOL 50 MG: 10 INJECTION, EMULSION INTRAVENOUS at 13:33

## 2024-05-14 RX ADMIN — PROPOFOL 60 MG: 10 INJECTION, EMULSION INTRAVENOUS at 13:29

## 2024-05-14 RX ADMIN — PROPOFOL 140 MCG/KG/MIN: 10 INJECTION, EMULSION INTRAVENOUS at 13:35

## 2024-05-14 RX ADMIN — PROPOFOL 50 MG: 10 INJECTION, EMULSION INTRAVENOUS at 13:36

## 2024-05-14 RX ADMIN — SODIUM CHLORIDE, SODIUM LACTATE, POTASSIUM CHLORIDE, AND CALCIUM CHLORIDE 125 ML/HR: .6; .31; .03; .02 INJECTION, SOLUTION INTRAVENOUS at 12:57

## 2024-05-14 RX ADMIN — PROPOFOL 140 MG: 10 INJECTION, EMULSION INTRAVENOUS at 13:27

## 2024-05-14 RX ADMIN — GLYCOPYRROLATE 0.2 MG: 0.2 INJECTION, SOLUTION INTRAMUSCULAR; INTRAVENOUS at 13:39

## 2024-05-14 RX ADMIN — LIDOCAINE HYDROCHLORIDE 100 MG: 20 INJECTION, SOLUTION EPIDURAL; INFILTRATION; INTRACAUDAL; PERINEURAL at 13:27

## 2024-05-14 RX ADMIN — PROPOFOL 50 MG: 10 INJECTION, EMULSION INTRAVENOUS at 13:31

## 2024-05-14 NOTE — H&P
[facility]  History & Physical     PATIENT INFO     Name: Brandie Gloria  YOB: 1975   Age: 48 y.o.   Sex: female   MRN: [id number]     HISTORY OF PRESENT ILLNESS     Brandie Gloria is a 48 y.o. year old female who presents for EGD and colonoscopy for GERD, loose stools, colon cancer screening. No prior colonoscopy. No antithrombotics or anticoagulants.     REVIEW OF SYSTEMS     Per the HPI, and otherwise unremarkable.    Historical Information   Past Medical History:   Diagnosis Date    Anxiety     Candidiasis of mouth     Last Assessed 21Apr2014    Depression     GERD (gastroesophageal reflux disease) 3/20/2024    Hypertension     Memory loss 4/7/2021    Obesity     Sciatica     Last Assessed 04Aug2014    Skin rash     Last Assessed 19Mar2014    Urticaria     Last Assessed 21Mar2014     Past Surgical History:   Procedure Laterality Date    APPENDECTOMY LAPAROSCOPIC N/A 6/24/2023    Procedure: APPENDECTOMY LAPAROSCOPIC;  Surgeon: Sanford Garcia DO;  Location: [facility];  Service: General    DENTAL SURGERY       Social History   Social History     Substance and Sexual Activity   Alcohol Use Yes    Alcohol/week: 9.0 - 12.0 standard drinks of alcohol    Types: 9 - 12 Glasses of wine per week    Comment: off and on     Social History     Substance and Sexual Activity   Drug Use No     Social History     Tobacco Use   Smoking Status Never    Passive exposure: Never   Smokeless Tobacco Never     Family History   Problem Relation Age of Onset    Anxiety disorder Mother     Depression Mother     Osteoporosis Mother     Testicular cancer Father     Cancer Father     Hypertension Father     Alcohol abuse Paternal Grandfather     Mental illness Paternal Grandmother     Cancer Family     Diabetes Family     Heart disease Family     Stroke Family         Stroke syndrome    Drug abuse Neg Hx     Completed Suicide  Neg Hx         MEDICATIONS & ALLERGIES     Current Outpatient  Medications   Medication Instructions    ALPRAZolam (XANAX) 0.5 mg, Oral, 3 times daily PRN    citalopram (CELEXA) 40 mg, Oral, Daily    ibuprofen (MOTRIN) 200 mg tablet Oral, Every 6 hours PRN    omeprazole (PRILOSEC OTC) 20 mg, Oral, Daily    VITAMIN D PO 1,000 Units, Oral, Daily     Allergies   Allergen Reactions    Pollen Extract Allergic Rhinitis        PHYSICAL EXAM      Objective   Blood pressure 128/81, pulse 71, temperature 98.8 °F (37.1 °C), temperature source Temporal, resp. rate 18, SpO2 96%. There is no height or weight on file to calculate BMI.    General Appearance:   Alert, cooperative, no distress   Lungs:   Equal chest rise, respirations unlabored    Heart:   Regular rate and rhythm   Abdomen:   Soft, non-tender, non-distended; normal bowel sounds; no masses, no organomegaly    Extremities:   No edema       ASSESSMENT & PLAN     This is a 48 y.o. year old female here for EGD and colonoscopy, and she is stable and optimized for her procedure.      Ramone Weeks D.O.  [facility]  Division of Gastroenterology & Hepatology  Available on TigerText  [email or web address]    ** Please Note: This note is constructed using a voice recognition dictation system. **

## 2024-05-14 NOTE — ANESTHESIA POSTPROCEDURE EVALUATION
Post-Op Assessment Note    CV Status:  Stable    Pain management: satisfactory to patient       Mental Status:  Awake and sleepy   Hydration Status:  Euvolemic   PONV Controlled:  Controlled   Airway Patency:  Patent     Post Op Vitals Reviewed: Yes    No anethesia notable event occurred.    Staff: Anesthesiologist, CRNA               BP   97/54   Temp      Pulse  79   Resp   16   SpO2   96

## 2024-05-14 NOTE — ANESTHESIA PREPROCEDURE EVALUATION
Procedure:  EGD  COLONOSCOPY    Relevant Problems   ANESTHESIA (within normal limits)      CARDIO   (+) Hyperlipidemia   (+) Hypertension   (-) Angina at rest   (-) Angina of effort   (-) Chest pain   (-) APPIAH (dyspnea on exertion)      ENDO   (-) Diabetes mellitus type 1 (HCC)   (-) Diabetes mellitus, type 2 (HCC)      GI/HEPATIC   (+) GERD (gastroesophageal reflux disease)      /RENAL   (-) Chronic kidney disease      MUSCULOSKELETAL   (+) Low back pain      NEURO/PSYCH   (+) Generalized anxiety disorder   (+) Recurrent major depressive episodes, moderate (HCC)   (-) CVA (cerebral vascular accident) (HCC)   (-) Seizures (HCC)      PULMONARY   (-) Asthma   (-) Chronic obstructive pulmonary disease (HCC)   (-) Sleep apnea        Physical Exam    Airway    Mallampati score: II  TM Distance: >3 FB  Neck ROM: full     Dental   No notable dental hx     Cardiovascular      Pulmonary      Other Findings  post-pubertal.      Anesthesia Plan  ASA Score- 2     Anesthesia Type- IV sedation with anesthesia with ASA Monitors.         Additional Monitors:     Airway Plan:            Plan Factors-Exercise tolerance (METS): >4 METS.    Chart reviewed.                      Induction- intravenous.    Postoperative Plan-     Informed Consent- Anesthetic plan and risks discussed with patient.  I personally reviewed this patient with the CRNA. Discussed and agreed on the Anesthesia Plan with the CRNA..

## 2024-05-20 PROCEDURE — 88305 TISSUE EXAM BY PATHOLOGIST: CPT | Performed by: PATHOLOGY

## 2024-06-02 ENCOUNTER — OFFICE VISIT (OUTPATIENT)
Dept: URGENT CARE | Facility: CLINIC | Age: 49
End: 2024-06-02
Payer: COMMERCIAL

## 2024-06-02 VITALS
DIASTOLIC BLOOD PRESSURE: 70 MMHG | SYSTOLIC BLOOD PRESSURE: 128 MMHG | RESPIRATION RATE: 18 BRPM | OXYGEN SATURATION: 96 % | HEART RATE: 76 BPM | TEMPERATURE: 97.6 F

## 2024-06-02 DIAGNOSIS — S80.811A ABRASION OF RIGHT LEG, INITIAL ENCOUNTER: Primary | ICD-10-CM

## 2024-06-02 DIAGNOSIS — Z23 ENCOUNTER FOR IMMUNIZATION: ICD-10-CM

## 2024-06-02 PROCEDURE — 90715 TDAP VACCINE 7 YRS/> IM: CPT

## 2024-06-02 PROCEDURE — 99212 OFFICE O/P EST SF 10 MIN: CPT | Performed by: NURSE PRACTITIONER

## 2024-06-02 PROCEDURE — 90471 IMMUNIZATION ADMIN: CPT | Performed by: NURSE PRACTITIONER

## 2024-06-02 NOTE — PATIENT INSTRUCTIONS
Abrasion   AMBULATORY CARE:   An abrasion  is a wound on your skin. Abrasions usually happen when your skin rubs against a rough surface. Examples of an abrasion include rug burn, a skinned elbow, or road rash. Abrasions can be deep or shallow The wound may hurt, bleed, bruise, or swell.  Seek care immediately if:   The bleeding does not stop after 10 minutes of firm pressure.    The redness around your wound begins to spread.    You cannot rinse one or more foreign objects out of your wound.    Call your doctor if:   You have a fever or chills.     Your abrasion is red, warm, swollen, or draining pus.    You have questions or concerns about your condition or care.    Care for your abrasion:   Wash your hands and dry them with a clean towel before first.    Press a clean cloth against your wound for 5 to 10 minutes to stop any bleeding.    Rinse your wound with clean water. Do not use harsh soap, alcohol, or iodine solutions.    Use a clean, wet cloth to remove any objects, such as small pieces of rocks or dirt.    Rub antibiotic ointment on your wound. This may help prevent infection and help your wound heal.    Cover the wound with a non-stick bandage. Change the bandage daily, and if it gets wet or dirty.    Follow up with your doctor as directed:  Write down your questions so you remember to ask them during your visits.  © Copyright [employer] 2023 Information is for End User's use only and may not be sold, redistributed or otherwise used for commercial purposes.  The above information is an  only. It is not intended as medical advice for individual conditions or treatments. Talk to your doctor, nurse or pharmacist before following any medical regimen to see if it is safe and effective for you.

## 2024-06-02 NOTE — PROGRESS NOTES
[facility]        NAME: Brandie Gloria is a 49 y.o. female  : 1975    MRN: [id number]  DATE: 2024  TIME: 8:09 PM      Assessment and Plan     Abrasion of right leg, initial encounter [S80.811A]  1. Abrasion of right leg, initial encounter        2. Encounter for immunization  Tdap Vaccine greater than or equal to 6yo    CANCELED: Tdap Vaccine greater than or equal to 6yo            Patient Instructions     Patient Instructions   Abrasion   AMBULATORY CARE:   An abrasion  is a wound on your skin. Abrasions usually happen when your skin rubs against a rough surface. Examples of an abrasion include rug burn, a skinned elbow, or road rash. Abrasions can be deep or shallow The wound may hurt, bleed, bruise, or swell.  Seek care immediately if:   The bleeding does not stop after 10 minutes of firm pressure.    The redness around your wound begins to spread.    You cannot rinse one or more foreign objects out of your wound.    Call your doctor if:   You have a fever or chills.     Your abrasion is red, warm, swollen, or draining pus.    You have questions or concerns about your condition or care.    Care for your abrasion:   Wash your hands and dry them with a clean towel before first.    Press a clean cloth against your wound for 5 to 10 minutes to stop any bleeding.    Rinse your wound with clean water. Do not use harsh soap, alcohol, or iodine solutions.    Use a clean, wet cloth to remove any objects, such as small pieces of rocks or dirt.    Rub antibiotic ointment on your wound. This may help prevent infection and help your wound heal.    Cover the wound with a non-stick bandage. Change the bandage daily, and if it gets wet or dirty.    Follow up with your doctor as directed:  Write down your questions so you remember to ask them during your visits.  © Copyright [employer]2023 Information is for End User's use only and may not be sold, redistributed or otherwise used for commercial  purposes.  The above information is an  only. It is not intended as medical advice for individual conditions or treatments. Talk to your doctor, nurse or pharmacist before following any medical regimen to see if it is safe and effective for you.      Follow up with PCP in 3-5 days.  Proceed to  ER if symptoms worsen.    Chief Complaint     Chief Complaint   Patient presents with    Knee Injury     Abrasion right knee after a fall.         History of Present Illness     Patient presents reporting that she was camping this past weekend and fell while hiking on a fairly dirty path.  She used a first-aid kit and cleaned it up thoroughly.  She presents noting that she has not had a tetanus booster in a long time and feels based on the dirty setting that she should have 1 since it was a decent sized scrape on her knee.        Review of Systems     Review of Systems   Skin:  Positive for wound.   All other systems reviewed and are negative.        Current Medications       Current Outpatient Medications:     ALPRAZolam (XANAX) 0.5 mg tablet, Take 1 tablet (0.5 mg total) by mouth 3 (three) times a day as needed (as neded), Disp: 90 tablet, Rfl: 0    citalopram (CeleXA) 40 mg tablet, Take 1 tablet (40 mg total) by mouth daily, Disp: 90 tablet, Rfl: 0    ibuprofen (MOTRIN) 200 mg tablet, Take by mouth every 6 (six) hours as needed for mild pain, Disp: , Rfl:     omeprazole (PriLOSEC OTC) 20 MG tablet, Take 20 mg by mouth daily, Disp: , Rfl:     VITAMIN D PO, Take 1,000 Units by mouth daily, Disp: , Rfl:     Current Allergies     Allergies as of 06/02/2024 - Reviewed 06/02/2024   Allergen Reaction Noted    Pollen extract Allergic Rhinitis 11/13/2012              The following portions of the patient's history were reviewed and updated as appropriate: allergies, current medications, past family history, past medical history, past social history, past surgical history and problem list.     Past Medical History:    Diagnosis Date    Anxiety     Candidiasis of mouth     Last Assessed 21Apr2014    Depression     GERD (gastroesophageal reflux disease) 3/20/2024    Hypertension     Memory loss 4/7/2021    Obesity     Sciatica     Last Assessed [id number]    Skin rash     Last Assessed 19Mar2014    Urticaria     Last Assessed 21Mar2014       Past Surgical History:   Procedure Laterality Date    APPENDECTOMY LAPAROSCOPIC N/A 6/24/2023    Procedure: APPENDECTOMY LAPAROSCOPIC;  Surgeon: Sanford Garcia DO;  Location: [facility];  Service: General    DENTAL SURGERY         Family History   Problem Relation Age of Onset    Anxiety disorder Mother     Depression Mother     Osteoporosis Mother     Testicular cancer Father     Cancer Father     Hypertension Father     Alcohol abuse Paternal Grandfather     Mental illness Paternal Grandmother     Cancer Family     Diabetes Family     Heart disease Family     Stroke Family         Stroke syndrome    Drug abuse Neg Hx     Completed Suicide  Neg Hx          Medications have been verified.        Objective     /70   Pulse 76   Temp 97.6 °F (36.4 °C)   Resp 18   LMP  (LMP Unknown)   SpO2 96%   No LMP recorded (lmp unknown).         Physical Exam     Physical Exam  Vitals and nursing note reviewed.   Constitutional:       General: She is not in acute distress.     Appearance: Normal appearance. She is well-developed. She is not ill-appearing, toxic-appearing or diaphoretic.   HENT:      Head: Normocephalic and atraumatic.   Pulmonary:      Effort: Pulmonary effort is normal. No respiratory distress.   Abdominal:      General: There is no distension.   Musculoskeletal:         General: Normal range of motion.      Cervical back: Normal range of motion and neck supple.   Skin:     General: Skin is warm and dry.      Capillary Refill: Capillary refill takes less than 2 seconds.      Findings: Abrasion present.          Neurological:      General: No focal deficit present.      Mental  Status: She is alert and oriented to person, place, and time.   Psychiatric:         Mood and Affect: Mood and affect normal.         Behavior: Behavior normal. Behavior is cooperative.         Thought Content: Thought content normal.         Judgment: Judgment normal.

## 2024-06-06 ENCOUNTER — DOCUMENTATION (OUTPATIENT)
Dept: PSYCHIATRY | Facility: CLINIC | Age: 49
End: 2024-06-06

## 2024-07-01 DIAGNOSIS — M25.561 RIGHT KNEE PAIN, UNSPECIFIED CHRONICITY: Primary | ICD-10-CM

## 2024-07-05 ENCOUNTER — APPOINTMENT (OUTPATIENT)
Dept: RADIOLOGY | Facility: CLINIC | Age: 49
End: 2024-07-05
Payer: COMMERCIAL

## 2024-07-05 DIAGNOSIS — M25.561 RIGHT KNEE PAIN, UNSPECIFIED CHRONICITY: ICD-10-CM

## 2024-07-05 PROCEDURE — 73564 X-RAY EXAM KNEE 4 OR MORE: CPT

## 2024-07-15 ENCOUNTER — OFFICE VISIT (OUTPATIENT)
Dept: URGENT CARE | Facility: CLINIC | Age: 49
End: 2024-07-15
Payer: COMMERCIAL

## 2024-07-15 VITALS
DIASTOLIC BLOOD PRESSURE: 80 MMHG | TEMPERATURE: 97 F | OXYGEN SATURATION: 98 % | HEART RATE: 73 BPM | SYSTOLIC BLOOD PRESSURE: 169 MMHG | RESPIRATION RATE: 18 BRPM

## 2024-07-15 DIAGNOSIS — M23.92 INTERNAL DERANGEMENT OF LEFT KNEE: Primary | ICD-10-CM

## 2024-07-15 PROCEDURE — 96372 THER/PROPH/DIAG INJ SC/IM: CPT | Performed by: STUDENT IN AN ORGANIZED HEALTH CARE EDUCATION/TRAINING PROGRAM

## 2024-07-15 PROCEDURE — 99213 OFFICE O/P EST LOW 20 MIN: CPT | Performed by: STUDENT IN AN ORGANIZED HEALTH CARE EDUCATION/TRAINING PROGRAM

## 2024-07-15 RX ORDER — KETOROLAC TROMETHAMINE 30 MG/ML
30 INJECTION, SOLUTION INTRAMUSCULAR; INTRAVENOUS ONCE
Status: COMPLETED | OUTPATIENT
Start: 2024-07-15 | End: 2024-07-15

## 2024-07-15 RX ADMIN — KETOROLAC TROMETHAMINE 30 MG: 30 INJECTION, SOLUTION INTRAMUSCULAR; INTRAVENOUS at 14:51

## 2024-07-15 NOTE — PROGRESS NOTES
[facility]        NAME: Brandie Gloria is a 49 y.o. female  : 1975    MRN: [id number]    Assessment and Plan   Internal derangement of left knee [M23.92]  1. Internal derangement of left knee  ketorolac (TORADOL) injection 30 mg    Ambulatory Referral to Physical Therapy    Ambulatory Referral to Orthopedic Surgery        No signs of knee instability but concern for possible medial meniscus involvement based on exam and symptoms.  Patient hinged knee immobilizer at this time.  Will refer to physical therapy and orthopedic surgery for further evaluation.  Given dose of Toradol in the office due to significant pain after exam.  Recommend RICE and NSAIDs.  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  Low concern of a tear at this point as there is no swelling or ecchymosis but I did instruct patient to monitor for these.    Patient Instructions     See wrap up for details  Proceed to  ER if symptoms worsen.    Chief Complaint     Chief Complaint   Patient presents with    Knee Pain     Started yesterday slipped and twisted left knee very painful heard a pop         History of Present Illness     Ankle Swelling  Associated symptoms include arthralgias. Pertinent negatives include no abdominal pain, chest pain, chills, coughing, fever, joint swelling, nausea, rash, sore throat or vomiting. The symptoms are aggravated by movement, palpation and weight bearing.       Presents with left knee injury  Was walking on rocks yesterday when her foot got caught under an her left knee gave out, twisted inwards and she heard a pop.  Since then, weightbearing has been very painful with sensation of knee giving out whenever she tries.   Denies any swelling, erythema.  Does report limitation in range of motion.      Answers submitted by the patient for this visit:  Lower Extremity Injury Questionnaire (Submitted on 7/15/2024)  Chief Complaint: Lower extremity pain  Incident occurred: 12 to 24 hours ago  [place]  Injury mechanism: a twisting injury  Pain location: left knee  Pain quality: aching  Pain - numeric: 6/10  Pain course: constant  tingling: No  inability to bear weight: Yes  loss of motion: Yes  loss of sensation: No  muscle weakness: No  Foreign body present: no foreign bodies    Review of Systems   Review of Systems   Constitutional:  Negative for chills and fever.   HENT:  Negative for ear pain and sore throat.    Eyes:  Negative for pain and visual disturbance.   Respiratory:  Negative for cough, chest tightness and shortness of breath.    Cardiovascular:  Negative for chest pain and palpitations.   Gastrointestinal:  Negative for abdominal pain, constipation, diarrhea, nausea and vomiting.   Genitourinary:  Negative for dysuria, hematuria and menstrual problem.   Musculoskeletal:  Positive for arthralgias. Negative for back pain and joint swelling.   Skin:  Negative for color change and rash.   Neurological:  Negative for seizures and syncope.   Psychiatric/Behavioral:  Negative for dysphoric mood and suicidal ideas.    All other systems reviewed and are negative.    Current Medications       Current Outpatient Medications:     ALPRAZolam (XANAX) 0.5 mg tablet, Take 1 tablet (0.5 mg total) by mouth 3 (three) times a day as needed (as neded), Disp: 90 tablet, Rfl: 0    citalopram (CeleXA) 40 mg tablet, Take 1 tablet (40 mg total) by mouth daily, Disp: 90 tablet, Rfl: 0    ibuprofen (MOTRIN) 200 mg tablet, Take by mouth every 6 (six) hours as needed for mild pain, Disp: , Rfl:     omeprazole (PriLOSEC OTC) 20 MG tablet, Take 20 mg by mouth daily, Disp: , Rfl:     VITAMIN D PO, Take 1,000 Units by mouth daily, Disp:  , Rfl:   No current facility-administered medications for this visit.    Current Allergies     Allergies as of 07/15/2024 - Reviewed 07/15/2024   Allergen Reaction Noted    Pollen extract Allergic Rhinitis 11/13/2012            The following portions of the patient's history were reviewed and updated as appropriate: allergies, current medications, past family history, past medical history, past social history, past surgical history and problem list.     Past Medical History:   Diagnosis Date    Anxiety     Candidiasis of mouth     Last Assessed 21Apr2014    Depression     GERD (gastroesophageal reflux disease) 3/20/2024    Hypertension     Memory loss 4/7/2021    Obesity     Sciatica     Last Assessed [id number]    Skin rash     Last Assessed 19Mar2014    Urticaria     Last Assessed 21Mar2014       Past Surgical History:   Procedure Laterality Date    APPENDECTOMY LAPAROSCOPIC N/A 6/24/2023    Procedure: APPENDECTOMY LAPAROSCOPIC;  Surgeon: Sanford Garcia DO;  Location: [facility];  Service: General    DENTAL SURGERY         Family History   Problem Relation Age of Onset    Anxiety disorder Mother     Depression Mother     Osteoporosis Mother     Testicular cancer Father     Cancer Father     Hypertension Father     Alcohol abuse Paternal Grandfather     Mental illness Paternal Grandmother     Cancer Family     Diabetes Family     Heart disease Family     Stroke Family         Stroke syndrome    Drug abuse Neg Hx     Completed Suicide  Neg Hx          Medications have been verified.        Objective   /80   Pulse 73   Temp (!) 97 °F (36.1 °C)   Resp 18   SpO2 98%        Physical Exam     Physical Exam  Constitutional:       General: She is not in acute distress.     Appearance: Normal appearance.   HENT:      Head: Normocephalic and atraumatic.   Eyes:      Extraocular Movements: Extraocular movements intact.      Conjunctiva/sclera: Conjunctivae normal.   Cardiovascular:      Rate and Rhythm: Normal rate.    Pulmonary:      Effort: Pulmonary effort is normal. No respiratory distress.   Musculoskeletal:      Left knee: No swelling, deformity, effusion, erythema, ecchymosis, lacerations, bony tenderness or crepitus. Decreased range of motion. Tenderness present over the MCL. No medial joint line, lateral joint line, LCL, ACL, PCL or patellar tendon tenderness. No LCL laxity, MCL laxity, ACL laxity or PCL laxity.Normal alignment, normal meniscus and normal patellar mobility. Normal pulse.      Instability Tests: Anterior drawer test negative. Posterior drawer test negative. Anterior Lachman test negative. Medial Tien test positive. Lateral Tien test negative.      Comments: Left knee-tenderness over the MCL area. No laxity of the joint with positive Tien test for medial meniscus pathology.  Unable to perform strength testing in knee extension.     Skin:     General: Skin is warm and dry.   Neurological:      General: No focal deficit present.      Mental Status: She is alert and oriented to person, place, and time.   Psychiatric:         Mood and Affect: Mood normal.         Behavior: Behavior normal.       Orthopedic injury treatment    Date/Time: 7/15/2024 2:30 PM    Performed by: Denita Garcia DO  Authorized by: Denita Garcia DO    Patient Location:  [facility] Protocol:  Consent: Verbal consent obtained.  Risks and benefits: risks, benefits and alternatives were discussed  Consent given by: patient  Required items: required blood products, implants, devices, and special equipment available  Patient identity confirmed: verbally with patient    Injury location:  Knee  Location details:  Left knee  Injury type:  Soft tissue  Neurovascular status: Neurovascularly intact    Distal perfusion: normal    Neurological function: normal    Range of motion: reduced    Immobilization:  Knee immobilizer (Hinged knee immobilizer brace)  Neurovascular status: Neurovascularly intact    Distal perfusion: normal     Neurological function: normal    Range of motion: unchanged    Patient tolerance:  Patient tolerated the procedure well with no immediate complications   Reports improvement in weightbearing with hinged knee immobilizer

## 2024-07-15 NOTE — LETTER
July 15, 2024     Patient: Brandie Gloria   YOB: 1975   Date of Visit: 7/15/2024       To Whom it May Concern:    Brandie Gloria was seen in my clinic on 7/15/2024. Please excuse absence on 7/16/24 and 7/17/24.    If you have any questions or concerns, please don't hesitate to call.         Sincerely,          Denita Garcia,         CC: No Recipients

## 2024-07-18 ENCOUNTER — OFFICE VISIT (OUTPATIENT)
Dept: OBGYN CLINIC | Facility: CLINIC | Age: 49
End: 2024-07-18
Payer: COMMERCIAL

## 2024-07-18 ENCOUNTER — APPOINTMENT (OUTPATIENT)
Dept: RADIOLOGY | Facility: CLINIC | Age: 49
End: 2024-07-18
Payer: COMMERCIAL

## 2024-07-18 VITALS
WEIGHT: 211.6 LBS | BODY MASS INDEX: 37.49 KG/M2 | HEART RATE: 74 BPM | SYSTOLIC BLOOD PRESSURE: 143 MMHG | DIASTOLIC BLOOD PRESSURE: 71 MMHG | HEIGHT: 63 IN | TEMPERATURE: 98.7 F

## 2024-07-18 DIAGNOSIS — G89.29 CHRONIC PAIN OF LEFT KNEE: ICD-10-CM

## 2024-07-18 DIAGNOSIS — S89.92XA KNEE INJURY, LEFT, INITIAL ENCOUNTER: Primary | ICD-10-CM

## 2024-07-18 DIAGNOSIS — M23.92 INTERNAL DERANGEMENT OF LEFT KNEE: ICD-10-CM

## 2024-07-18 DIAGNOSIS — S83.207A MCMURRAY TEST POSITIVE, LEFT, INITIAL ENCOUNTER: ICD-10-CM

## 2024-07-18 DIAGNOSIS — M25.562 CHRONIC PAIN OF LEFT KNEE: ICD-10-CM

## 2024-07-18 PROCEDURE — 73562 X-RAY EXAM OF KNEE 3: CPT

## 2024-07-18 PROCEDURE — 99204 OFFICE O/P NEW MOD 45 MIN: CPT | Performed by: FAMILY MEDICINE

## 2024-07-18 NOTE — LETTER
July 18, 2024     Patient: Brandie Gloria  YOB: 1975  Date of Visit: 7/18/2024      To Whom it May Concern:    Brandie Gloria is under my professional care. Brandie was seen in my office on 7/18/2024. Brandie may return to work on 7/22/24 .    If you have any questions or concerns, please don't hesitate to call.         Sincerely,          Robert Woody MD        CC: No Recipients

## 2024-07-18 NOTE — PROGRESS NOTES
[facility] [address]  [facility] [address] [phone]      Assessment:  1. Knee injury, left, initial encounter  -     XR knee 3 vw left non injury; Future; Expected date: 07/18/2024  -     MRI knee left  wo contrast; Future; Expected date: 07/18/2024  -     Knee Immobilizer  2. Internal derangement of left knee  -     Ambulatory Referral to Orthopedic Surgery  -     MRI knee left  wo contrast; Future; Expected date: 07/18/2024  -     Knee Immobilizer  3. Kalani test positive, left, initial encounter  -     MRI knee left  wo contrast; Future; Expected date: 07/18/2024  -     Knee Immobilizer      Plan:  Patient Instructions   F/u after MRI  MRI L knee-  L knee pain/injury/pos kalani/pain with valgus, pain walking/bending knee.  XR neg- r/o medial meniscus vs MCL tear.  Knee immoibilizer  Icing/heat/OTC pain meds as needed.     Return for f/u after MRI L knee, Recheck.    Chief Complaint:  Chief Complaint   Patient presents with    Left Knee - Pain       Subjective:   HPI    Patient ID: Brandie Gloria is a 49 y.o. female     Here c/o L knee pain  Sunday [place] walking [place] slippery rocks and foot caught under a rock and foot went one way, knee the other way.  Seen in .   Given toradol inj-. Note reviewed  Having pain today- feels tight  Swelled/giving out  Pain walking/bendng  No locking  Ibuprofen PRN helps.  Dull/sharp pain  Better at rest.      Review of Systems   Constitutional:  Negative for fatigue and fever.   Respiratory:  Negative for shortness of breath.    Cardiovascular:  Negative for chest pain.   Gastrointestinal:  Negative for abdominal pain and nausea.   Genitourinary:  Negative for dysuria.   Musculoskeletal:  Positive for arthralgias.   Skin:  Positive for wound. Negative for rash.   Neurological:  Negative for weakness and headaches.       Objective:  Vitals:  /71 (BP Location: Left arm, Patient  "Position: Sitting, Cuff Size: Large)   Pulse 74   Temp 98.7 °F (37.1 °C) (Tympanic)   Ht 5' 3\" (1.6 m)   Wt 96 kg (211 lb 9.6 oz)   BMI 37.48 kg/m²     The following portions of the patient's history were reviewed and updated as appropriate: allergies, current medications, past family history, past medical history, past social history, past surgical history, and problem list.    Physical exam:  Physical Exam  Constitutional:       Appearance: Normal appearance. She is normal weight.   HENT:      Head: Normocephalic.   Eyes:      Extraocular Movements: Extraocular movements intact.   Pulmonary:      Effort: Pulmonary effort is normal.   Musculoskeletal:      Cervical back: Normal range of motion.      Left knee: No effusion.      Instability Tests: Medial Tien test positive and lateral Tien test positive.   Skin:     General: Skin is warm and dry.   Neurological:      General: No focal deficit present.      Mental Status: She is alert and oriented to person, place, and time. Mental status is at baseline.   Psychiatric:         Mood and Affect: Mood normal.         Behavior: Behavior normal.         Thought Content: Thought content normal.         Judgment: Judgment normal.       Left Knee Exam     Tenderness   The patient is experiencing tenderness in the medial joint line and MCL.    Range of Motion   The patient has normal left knee ROM.    Tests   Tien:  Medial - positive Lateral - positive  Varus: negative Valgus: positive  Patellar apprehension: positive    Other   Swelling: none  Effusion: no effusion present          Observations   Left Knee   Negative for effusion.       I have personally reviewed pertinent films in PACS and my interpretation is XR  L knee- nml study. No fx.      Robert Woody MD   "

## 2024-07-18 NOTE — PATIENT INSTRUCTIONS
F/u after MRI  MRI L knee-  L knee pain/injury/pos kalani/pain with valgus, pain walking/bending knee.  XR neg- r/o medial meniscus vs MCL tear.  Knee immoibilizer  Icing/heat/OTC pain meds as needed.

## 2024-07-25 ENCOUNTER — HOSPITAL ENCOUNTER (OUTPATIENT)
Dept: MRI IMAGING | Facility: HOSPITAL | Age: 49
Discharge: HOME | End: 2024-07-25
Attending: FAMILY MEDICINE
Payer: COMMERCIAL

## 2024-07-25 DIAGNOSIS — S89.92XA KNEE INJURY, LEFT, INITIAL ENCOUNTER: ICD-10-CM

## 2024-07-25 DIAGNOSIS — S83.207A MCMURRAY TEST POSITIVE, LEFT, INITIAL ENCOUNTER: ICD-10-CM

## 2024-07-25 DIAGNOSIS — M23.92 INTERNAL DERANGEMENT OF LEFT KNEE: ICD-10-CM

## 2024-07-25 PROCEDURE — 73721 MRI JNT OF LWR EXTRE W/O DYE: CPT

## 2024-08-02 ENCOUNTER — OFFICE VISIT (OUTPATIENT)
Dept: OBGYN CLINIC | Facility: CLINIC | Age: 49
End: 2024-08-02
Payer: COMMERCIAL

## 2024-08-02 VITALS
OXYGEN SATURATION: 98 % | DIASTOLIC BLOOD PRESSURE: 95 MMHG | TEMPERATURE: 98.3 F | SYSTOLIC BLOOD PRESSURE: 147 MMHG | HEART RATE: 71 BPM | BODY MASS INDEX: 37.39 KG/M2 | HEIGHT: 63 IN | WEIGHT: 211 LBS

## 2024-08-02 DIAGNOSIS — S83.412A SPRAIN OF MEDIAL COLLATERAL LIGAMENT OF LEFT KNEE, INITIAL ENCOUNTER: Primary | ICD-10-CM

## 2024-08-02 DIAGNOSIS — S89.92XD KNEE INJURY, LEFT, SUBSEQUENT ENCOUNTER: ICD-10-CM

## 2024-08-02 PROCEDURE — 99214 OFFICE O/P EST MOD 30 MIN: CPT | Performed by: FAMILY MEDICINE

## 2024-08-02 NOTE — PROGRESS NOTES
[facility] [address] [id number] [phone]      Assessment:  1. Sprain of medial collateral ligament of left knee, initial encounter  -     Ambulatory Referral to Physical Therapy; Future  2. Knee injury, left, subsequent encounter  -     Ambulatory Referral to Physical Therapy; Future      Plan:  Patient Instructions   F/u 3 wks  Begin physical therapy  Restart wearing hinged knee brace  Home exercises.   Return in about 3 weeks (around 8/23/2024) for Recheck.    Chief Complaint:  Chief Complaint   Patient presents with    Left Knee - Follow-up, Pain       Subjective:   HPI    Patient ID: Brandie Gloria is a 49 y.o. female     Here for f/u L knee pain/MRI  Having less pain but still having pain  Good/bad days  Mild pain with walking.  No Swelling/locking/giving out  Pain walking/bendng  Naproxen/Ibuprofen PRN helps.  Dull achey  pain  Better with movement.  Couldn't work with brace- stopped using    MRI LEFT KNEE     INDICATION:   S89.92XA: Unspecified injury of left lower leg, initial encounter  M23.92: Unspecified internal derangement of left knee  S83.207A: Unspecified tear of unspecified meniscus, current injury, left knee, initial encounter.     COMPARISON: Correlation is made with the prior radiograph dated 7/18/2024.     TECHNIQUE:    Multiplanar/multisequence MR of the left knee was performed.  Imaging performed on 1.5T MRI     FINDINGS:     SUBCUTANEOUS TISSUES: Anteromedial subcutaneous edema.     JOINT EFFUSION: Trace joint effusion.     BAKER'S CYST: None.     MENISCI: Intact.     CRUCIATE LIGAMENTS: Intact.     EXTENSOR APPARATUS: Intact.     COLLATERAL LIGAMENTS: There is edema surrounding the fibers of the medial collateral ligament compatible with a grade 1 sprain. The lateral collateral ligamentous complex is intact. There is however a tear of the medial patellofemoral ligament noted.     ARTICULAR SURFACES:  "Normal.     BONES: Normal.     MUSCULATURE:  Intact.     IMPRESSION:     Medial patellofemoral ligament tear.     Grade 1 sprain medial collateral ligament.     No discrete meniscus tear.       Review of Systems   Constitutional:  Negative for fatigue and fever.   Respiratory:  Negative for shortness of breath.    Cardiovascular:  Negative for chest pain.   Gastrointestinal:  Negative for abdominal pain and nausea.   Genitourinary:  Negative for dysuria.   Musculoskeletal:  Positive for arthralgias.   Skin:  Negative for rash and wound.   Neurological:  Negative for weakness and headaches.       Objective:  Vitals:  /95   Pulse 71   Temp 98.3 °F (36.8 °C)   Ht 5' 3\" (1.6 m)   Wt 95.7 kg (211 lb)   LMP 05/31/2024 (Exact Date)   SpO2 98%   BMI 37.38 kg/m²     The following portions of the patient's history were reviewed and updated as appropriate: allergies, current medications, past family history, past medical history, past social history, past surgical history, and problem list.    Physical exam:  Physical Exam  Constitutional:       Appearance: Normal appearance. She is normal weight.   HENT:      Head: Normocephalic.   Eyes:      Extraocular Movements: Extraocular movements intact.   Pulmonary:      Effort: Pulmonary effort is normal.   Musculoskeletal:      Cervical back: Normal range of motion.      Left knee: No effusion.      Instability Tests: Medial Tien test positive. Lateral Tien test negative.   Skin:     General: Skin is warm and dry.   Neurological:      General: No focal deficit present.      Mental Status: She is alert and oriented to person, place, and time. Mental status is at baseline.   Psychiatric:         Mood and Affect: Mood normal.         Behavior: Behavior normal.         Thought Content: Thought content normal.         Judgment: Judgment normal.       Left Knee Exam     Tenderness   The patient is experiencing tenderness in the MCL and medial joint line.    Range of " Motion   The patient has normal left knee ROM.    Tests   Tien:  Medial - positive Lateral - negative  Varus: negative Valgus: positive  Patellar apprehension: positive    Other   Swelling: none  Effusion: no effusion present          Observations   Left Knee   Negative for effusion.       I have personally reviewed pertinent films in PACS and my interpretation is MRI L knee-  grade 1 MCL sprain.  MPFL tear..      Robert Woody MD

## 2024-09-04 ENCOUNTER — OFFICE VISIT (OUTPATIENT)
Dept: URGENT CARE | Facility: CLINIC | Age: 49
End: 2024-09-04
Payer: COMMERCIAL

## 2024-09-04 VITALS
SYSTOLIC BLOOD PRESSURE: 170 MMHG | OXYGEN SATURATION: 98 % | TEMPERATURE: 97.6 F | HEART RATE: 74 BPM | BODY MASS INDEX: 37.38 KG/M2 | RESPIRATION RATE: 20 BRPM | DIASTOLIC BLOOD PRESSURE: 80 MMHG | WEIGHT: 211 LBS

## 2024-09-04 DIAGNOSIS — S39.012A STRAIN OF MUSCLE, FASCIA AND TENDON OF LOWER BACK, INITIAL ENCOUNTER: Primary | ICD-10-CM

## 2024-09-04 PROCEDURE — 99214 OFFICE O/P EST MOD 30 MIN: CPT | Performed by: NURSE PRACTITIONER

## 2024-09-04 PROCEDURE — 96372 THER/PROPH/DIAG INJ SC/IM: CPT | Performed by: NURSE PRACTITIONER

## 2024-09-04 RX ORDER — DEXAMETHASONE SODIUM PHOSPHATE 10 MG/ML
10 INJECTION, SOLUTION INTRAMUSCULAR; INTRAVENOUS ONCE
Status: COMPLETED | OUTPATIENT
Start: 2024-09-04 | End: 2024-09-04

## 2024-09-04 RX ORDER — LIDOCAINE 50 MG/G
1 PATCH TOPICAL DAILY
Qty: 10 PATCH | Refills: 0 | Status: SHIPPED | OUTPATIENT
Start: 2024-09-04

## 2024-09-04 RX ORDER — METHOCARBAMOL 500 MG/1
500 TABLET, FILM COATED ORAL 3 TIMES DAILY
Qty: 30 TABLET | Refills: 0 | Status: SHIPPED | OUTPATIENT
Start: 2024-09-04

## 2024-09-04 RX ADMIN — DEXAMETHASONE SODIUM PHOSPHATE 10 MG: 10 INJECTION, SOLUTION INTRAMUSCULAR; INTRAVENOUS at 13:33

## 2024-09-04 NOTE — PROGRESS NOTES
[facility]        NAME: Brandie Gloria is a 49 y.o. female  : 1975    MRN: [id number]  DATE: 2024  TIME: 1:41 PM    Assessment and Plan   Strain of muscle, fascia and tendon of lower back, initial encounter [S39.012A]  1. Strain of muscle, fascia and tendon of lower back, initial encounter  dexamethasone (PF) (DECADRON) injection 10 mg    methocarbamol (ROBAXIN) 500 mg tablet    lidocaine (Lidoderm) 5 %            Patient Instructions       Follow up with PCP in 3-5 days.  Proceed to  ER if symptoms worsen.    If tests have been performed at [facility], our office will contact you with results if changes need to be made to the care plan discussed with you at the visit.  You can review your full results on [facility].        You have been given an injection of dexamethasone.  You have been prescribed robaxin for muscle spasms. Do NOT drink alcohol or drive machinery while taking.   You have been prescribed lidoderm patches.  Take tylenol and/or motrin for pain.  Follow up with your PCP in 3-5 days  Go to the ED if symptoms worsen     Chief Complaint   Patient presents with    Back Pain     Bent over wit dog in her arms          History of Present Illness       This is a 49 year old female who states was holding a dog walking down the steps and stepped on a blanket and tripped over dogs straining her lower back. She states she took ibuprofen w/o relief.  She denies any other injuries.  PMH is listed. Denies pregnancy.     Back Pain        Review of Systems   Review of Systems   Constitutional: Negative.    HENT: Negative.     Eyes: Negative.    Respiratory: Negative.     Cardiovascular: Negative.    Gastrointestinal: Negative.    Endocrine: Negative.    Genitourinary: Negative.    Musculoskeletal:  Positive for back pain.   Skin: Negative.    Allergic/Immunologic: Negative.    Neurological: Negative.    Hematological: Negative.    Psychiatric/Behavioral: Negative.            Current Medications       Current Outpatient Medications:     lidocaine (Lidoderm) 5 %, Apply 1 patch topically over 12 hours daily Remove & Discard patch within 12 hours or as directed by MD, Disp: 10 patch, Rfl: 0    methocarbamol (ROBAXIN) 500 mg tablet, Take 1 tablet (500 mg total) by mouth 3 (three) times a day, Disp: 30 tablet, Rfl: 0    ALPRAZolam (XANAX) 0.5 mg tablet, Take 1 tablet (0.5 mg total) by mouth 3 (three) times a day as needed (as neded), Disp: 90 tablet, Rfl: 0    citalopram (CeleXA) 40 mg tablet, Take 1 tablet (40 mg total) by mouth daily, Disp: 90 tablet, Rfl: 0    ibuprofen (MOTRIN) 200 mg tablet, Take by mouth every 6 (six) hours as needed for mild pain, Disp: , Rfl:     omeprazole (PriLOSEC OTC) 20 MG tablet, Take 20 mg by mouth daily, Disp: , Rfl:     VITAMIN D PO, Take 1,000 Units by mouth daily, Disp: , Rfl:   No current facility-administered medications for this visit.    Current Allergies     Allergies as of 09/04/2024 - Reviewed 09/04/2024   Allergen Reaction Noted    Pollen extract Allergic Rhinitis 11/13/2012            The following portions of the patient's history were reviewed and updated as appropriate: allergies, current medications, past family history, past medical history, past social history, past surgical history and problem list.     Past Medical History:   Diagnosis Date    Anxiety     Candidiasis of mouth     Last Assessed 21Apr2014    Depression     GERD (gastroesophageal reflux disease) 3/20/2024    Hypertension     Memory loss 4/7/2021    Obesity     Sciatica     Last Assessed 04Aug2014    Skin rash     Last Assessed 19Mar2014    Urticaria     Last Assessed 21Mar2014       Past Surgical History:   Procedure Laterality Date    APPENDECTOMY LAPAROSCOPIC N/A 6/24/2023    Procedure: APPENDECTOMY LAPAROSCOPIC;  Surgeon: Sanford Garcia DO;  Location: [facility];  Service: General    DENTAL SURGERY         Family History   Problem Relation Age of Onset    Anxiety  disorder Mother     Depression Mother     Osteoporosis Mother     Testicular cancer Father     Cancer Father     Hypertension Father     Alcohol abuse Paternal Grandfather     Mental illness Paternal Grandmother     Cancer Family     Diabetes Family     Heart disease Family     Stroke Family         Stroke syndrome    Drug abuse Neg Hx     Completed Suicide  Neg Hx          Medications have been verified.        Objective   /80   Pulse 74   Temp 97.6 °F (36.4 °C)   Resp 20   Wt 95.7 kg (211 lb)   SpO2 98%   BMI 37.38 kg/m²   No LMP recorded.       Physical Exam     Physical Exam  Vitals and nursing note reviewed.   Constitutional:       General: She is not in acute distress.     Appearance: Normal appearance. She is obese. She is not ill-appearing, toxic-appearing or diaphoretic.   HENT:      Head: Normocephalic and atraumatic.   Eyes:      Extraocular Movements: Extraocular movements intact.   Cardiovascular:      Rate and Rhythm: Normal rate.      Pulses: Normal pulses.   Pulmonary:      Effort: Pulmonary effort is normal.   Musculoskeletal:         General: Signs of injury present. No swelling, tenderness or deformity. Normal range of motion.      Cervical back: Normal range of motion.      Comments: Negative bilateral straight leg raises.   DTR hyper reflex right    Left +2  NVI  sensation intact     Skin:     General: Skin is warm and dry.      Capillary Refill: Capillary refill takes less than 2 seconds.   Neurological:      General: No focal deficit present.      Mental Status: She is alert and oriented to person, place, and time.   Psychiatric:         Mood and Affect: Mood normal.         Behavior: Behavior normal.         Thought Content: Thought content normal.         Judgment: Judgment normal.

## 2024-09-04 NOTE — PATIENT INSTRUCTIONS
You have been given an injection of dexamethasone.  You have been prescribed robaxin for muscle spasms. Do NOT drink alcohol or drive machinery while taking.   You have been prescribed lidoderm patches.  Take tylenol and/or motrin for pain.  Follow up with your PCP in 3-5 days  Go to the ED if symptoms worsen

## 2024-10-18 DIAGNOSIS — F33.1 RECURRENT MAJOR DEPRESSIVE EPISODES, MODERATE (HCC): ICD-10-CM

## 2024-10-18 DIAGNOSIS — F33.41 RECURRENT MAJOR DEPRESSIVE DISORDER, IN PARTIAL REMISSION (HCC): ICD-10-CM

## 2024-10-18 RX ORDER — CITALOPRAM HYDROBROMIDE 40 MG/1
40 TABLET ORAL DAILY
Qty: 90 TABLET | Refills: 1 | Status: SHIPPED | OUTPATIENT
Start: 2024-10-18

## 2024-10-20 RX ORDER — ALPRAZOLAM 0.5 MG
0.5 TABLET ORAL 3 TIMES DAILY PRN
Qty: 90 TABLET | Refills: 0 | Status: SHIPPED | OUTPATIENT
Start: 2024-10-20

## 2025-01-16 ENCOUNTER — HOSPITAL ENCOUNTER (EMERGENCY)
Facility: HOSPITAL | Age: 50
Discharge: HOME | End: 2025-01-16
Attending: EMERGENCY MEDICINE
Payer: OTHER MISCELLANEOUS

## 2025-01-16 VITALS
HEART RATE: 70 BPM | DIASTOLIC BLOOD PRESSURE: 81 MMHG | TEMPERATURE: 97.6 F | OXYGEN SATURATION: 99 % | RESPIRATION RATE: 16 BRPM | SYSTOLIC BLOOD PRESSURE: 124 MMHG
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
## 2025-01-16 LAB — ALT SERPL W P-5'-P-CCNC: 14 U/L (ref 7–52)

## 2025-01-16 PROCEDURE — 86706 HEP B SURFACE ANTIBODY: CPT | Performed by: PHYSICIAN ASSISTANT

## 2025-01-16 PROCEDURE — 99283 EMERGENCY DEPT VISIT LOW MDM: CPT

## 2025-01-16 PROCEDURE — 86803 HEPATITIS C AB TEST: CPT | Performed by: PHYSICIAN ASSISTANT

## 2025-01-16 PROCEDURE — 87389 HIV-1 AG W/HIV-1&-2 AB AG IA: CPT | Performed by: PHYSICIAN ASSISTANT

## 2025-01-16 PROCEDURE — 84460 ALANINE AMINO (ALT) (SGPT): CPT | Performed by: PHYSICIAN ASSISTANT

## 2025-01-16 PROCEDURE — 36415 COLL VENOUS BLD VENIPUNCTURE: CPT | Performed by: PHYSICIAN ASSISTANT

## 2025-01-16 PROCEDURE — 87340 HEPATITIS B SURFACE AG IA: CPT | Performed by: PHYSICIAN ASSISTANT

## 2025-01-16 PROCEDURE — 99284 EMERGENCY DEPT VISIT MOD MDM: CPT | Performed by: PHYSICIAN ASSISTANT

## 2025-01-17 LAB
HBV SURFACE AB SER-ACNC: 21.6 MIU/ML
HBV SURFACE AG SER QL: NORMAL
HCV AB SER QL: NORMAL
HIV 1+2 AB+HIV1 P24 AG SERPL QL IA: NORMAL
HIV 2 AB SERPL QL IA: NORMAL
HIV1 AB SERPL QL IA: NORMAL
HIV1 P24 AG SERPL QL IA: NORMAL

## 2025-01-24 ENCOUNTER — APPOINTMENT (OUTPATIENT)
Dept: RADIOLOGY | Facility: CLINIC | Age: 50
End: 2025-01-24
Payer: COMMERCIAL

## 2025-01-24 ENCOUNTER — OFFICE VISIT (OUTPATIENT)
Dept: URGENT CARE | Facility: CLINIC | Age: 50
End: 2025-01-24
Payer: COMMERCIAL

## 2025-01-24 VITALS
RESPIRATION RATE: 18 BRPM | TEMPERATURE: 97.5 F | SYSTOLIC BLOOD PRESSURE: 164 MMHG | OXYGEN SATURATION: 98 % | HEART RATE: 64 BPM | DIASTOLIC BLOOD PRESSURE: 95 MMHG

## 2025-01-24 DIAGNOSIS — M77.31 HEEL SPUR, RIGHT: Primary | ICD-10-CM

## 2025-01-24 DIAGNOSIS — M79.671 RIGHT FOOT PAIN: ICD-10-CM

## 2025-01-24 PROCEDURE — 73650 X-RAY EXAM OF HEEL: CPT

## 2025-01-24 PROCEDURE — 99213 OFFICE O/P EST LOW 20 MIN: CPT | Performed by: PHYSICIAN ASSISTANT

## 2025-01-24 RX ORDER — PREDNISONE 10 MG/1
TABLET ORAL
Qty: 26 TABLET | Refills: 0 | Status: SHIPPED | OUTPATIENT
Start: 2025-01-24

## 2025-01-25 ENCOUNTER — RESULTS FOLLOW-UP (OUTPATIENT)
Dept: URGENT CARE | Facility: CLINIC | Age: 50
End: 2025-01-25

## 2025-01-25 NOTE — PROGRESS NOTES
[facility]    NAME: Brandie Gloria is a 49 y.o. female  : 1975    MRN: [id number]  DATE: 2025  TIME: 8:28 PM    Assessment and Plan   Heel spur, right [M77.31]  1. Heel spur, right  XR heel / calcaneus 2+ vw right    predniSONE 10 mg tablet    Ambulatory Referral to Podiatry          Patient Instructions     Patient Instructions   Prednisone as directed  Frozen water bottle stretches  Follow up with podiatry.     Chief Complaint     Chief Complaint   Patient presents with    right heel pain     Right heel pain for 3 weeks        History of Present Illness   49-year-old female here with complaint of right heel pain for the last 3 progressively worse.  Pain with weightbearing.  No known injury.        Review of Systems   Review of Systems   Constitutional:  Negative for chills and fever.   Musculoskeletal:         Right heel pain       Current Medications     Current Outpatient Medications:     ALPRAZolam (XANAX) 0.5 mg tablet, Take 1 tablet (0.5 mg total) by mouth 3 (three) times a day as needed (as neded), Disp: 90 tablet, Rfl: 0    citalopram (CeleXA) 40 mg tablet, Take 1 tablet (40 mg total) by mouth daily, Disp: 90 tablet, Rfl: 1    ibuprofen (MOTRIN) 200 mg tablet, Take by mouth every 6 (six) hours as needed for mild pain, Disp: , Rfl:     omeprazole (PriLOSEC OTC) 20 MG tablet, Take 20 mg by mouth daily, Disp: , Rfl:     predniSONE 10 mg tablet, Take 3 tabs BID X 2 days, 2 tabs BID X 2 days, 1 tab BID X 2 days, 1 tab daily X 2 days, Disp: 26 tablet, Rfl: 0    VITAMIN D PO, Take 1,000 Units by mouth daily, Disp: , Rfl:     lidocaine (Lidoderm) 5 %, Apply 1 patch topically over 12 hours daily Remove & Discard patch within 12 hours or as directed by MD, Disp: 10 patch, Rfl: 0    methocarbamol (ROBAXIN) 500 mg tablet, Take 1 tablet (500 mg total) by mouth 3 (three) times a day, Disp: 30 tablet, Rfl: 0    Current Allergies     Allergies as of 2025 - Reviewed 2025   Allergen  Reaction Noted    Pollen extract Allergic Rhinitis 11/13/2012          The following portions of the patient's history were reviewed and updated as appropriate: allergies, current medications, past family history, past medical history, past social history, past surgical history and problem list.   Past Medical History:   Diagnosis Date    Anxiety     Candidiasis of mouth     Last Assessed 21Apr2014    Depression     GERD (gastroesophageal reflux disease) 3/20/2024    Hypertension     Memory loss 4/7/2021    Obesity     Sciatica     Last Assessed 04Aug2014    Skin rash     Last Assessed 19Mar2014    Urticaria     Last Assessed 21Mar2014     Past Surgical History:   Procedure Laterality Date    APPENDECTOMY LAPAROSCOPIC N/A 6/24/2023    Procedure: APPENDECTOMY LAPAROSCOPIC;  Surgeon: Sanford Garcia DO;  Location: [facility];  Service: General    DENTAL SURGERY       Family History   Problem Relation Age of Onset    Anxiety disorder Mother     Depression Mother     Osteoporosis Mother     Testicular cancer Father     Cancer Father     Hypertension Father     Alcohol abuse Paternal Grandfather     Mental illness Paternal Grandmother     Cancer Family     Diabetes Family     Heart disease Family     Stroke Family         Stroke syndrome    Drug abuse Neg Hx     Completed Suicide  Neg Hx      Social History     Socioeconomic History    Marital status: /Civil Union     Spouse name: Not on file    Number of children: 0    Years of education: Not on file    Highest education level: Bachelor's degree (e.g., BA, AB, BS)   Occupational History    Occupation: Visiting RN     Employer: [employer]   Tobacco Use    Smoking status: Never     Passive exposure: Never    Smokeless tobacco: Never   Vaping Use    Vaping status: Never Used   Substance and Sexual Activity    Alcohol use: Yes     Alcohol/week: 9.0 - 12.0 standard drinks of alcohol     Types: 9 - 12 Glasses of wine per week     Comment: off and on     Drug use: No    Sexual activity: Yes   Other Topics Concern    Not on file   Social History Narrative        Employed - [employer] Full Time    Lives with spouse     Caffeine use    Uses safety equipment: seat belts     Social Drivers of Health     Financial Resource Strain: Not on file   Food Insecurity: Not on file   Transportation Needs: Not on file   Physical Activity: Not on file   Stress: Not on file   Social Connections: Not on file   Intimate Partner Violence: Not on file   Housing Stability: Not on file     Medications have been verified.    Objective   /95   Pulse 64   Temp 97.5 °F (36.4 °C)   Resp 18   LMP  (LMP Unknown)   SpO2 98%      Physical Exam   Physical Exam  Vitals and nursing note reviewed.   Musculoskeletal:        Feet:    Feet:      Left foot:      Skin integrity: No erythema.

## 2025-01-27 ENCOUNTER — DOCUMENTATION (OUTPATIENT)
Dept: ADMINISTRATIVE | Facility: OTHER | Age: 50
End: 2025-01-27

## 2025-01-27 NOTE — PROGRESS NOTES
Michelle Behler, PA-C  P Patient Reported Team         Blood pressure elevated  Appointment department: [facility]  Appointment provider: Michelle Behler, PA-C  Blood pressure  01/24/25 1835 164/95  01/24/25 1819 164/95    01/27/25 11:44 AM    Patient was called after the Urgent Care visit ; a message was left for the patient to return the call    Thank you.  Juliana Ortiz  [facility]

## 2025-04-11 ENCOUNTER — OFFICE VISIT (OUTPATIENT)
Dept: PODIATRY | Facility: CLINIC | Age: 50
End: 2025-04-11
Payer: COMMERCIAL

## 2025-04-11 VITALS — BODY MASS INDEX: 37.39 KG/M2 | WEIGHT: 211 LBS | HEIGHT: 63 IN

## 2025-04-11 DIAGNOSIS — M72.2 PLANTAR FASCIITIS: ICD-10-CM

## 2025-04-11 DIAGNOSIS — G60.0 CHARCOT-MARIE-TOOTH DISEASE: ICD-10-CM

## 2025-04-11 DIAGNOSIS — M24.573 EQUINUS CONTRACTURE OF ANKLE: Primary | ICD-10-CM

## 2025-04-11 PROCEDURE — 99203 OFFICE O/P NEW LOW 30 MIN: CPT | Performed by: PODIATRIST

## 2025-04-11 NOTE — PROGRESS NOTES
Name: Brandie Gloria      : 1975      MRN: [id number]  Encounter Provider: Jonas Looney DPM  Encounter Date: 2025   Encounter department: [facility] [place]  :  Assessment & Plan  Equinus contracture of ankle    Orders:    Ambulatory referral to Physical Therapy; Future    Plantar fasciitis    Orders:    Ambulatory referral to Physical Therapy; Future    Charcot-Arelis-Tooth disease    Orders:    Ambulatory referral to Physical Therapy; Future    - She has been having some changes slowly over time, with her presentation she is presenting with plantar fasciitis but the underlying cause here is her severe equinus which is likely linked to her Charcot-Arelis-Tooth disease  - She has a familial history of this and her symptoms are consistent  - I would recommend following up with the [facility] out in [place] for continued care  - For now we will begin her with aggressive physical therapy for strengthening exercises and gait training and also reduction in control of her equinus at this point time with her current function she does not need a dropfoot brace but if she cannot get in with the [facility] I would recommend checking in with me in 8 months to a year  -    History of Present Illness   HPI  Brandie Gloria is a 49 y.o. female who presents for evaluation and management of her right heel. Did have XR in January. Not walking on this makes it better. Ibuprofen takes the edge off. She wear crocs in the house. Never walks barefoot.           Review of Systems   Constitutional:  Negative for chills and fever.   HENT:  Negative for ear pain and sore throat.    Eyes:  Negative for pain and visual disturbance.   Respiratory:  Negative for cough and shortness of breath.    Cardiovascular:  Negative for chest pain and palpitations.   Gastrointestinal:  Negative for abdominal pain and vomiting.   Genitourinary:  Negative for dysuria and hematuria.  "  Musculoskeletal:  Negative for arthralgias and back pain.   Skin:  Negative for color change and rash.   Neurological:  Negative for seizures and syncope.   All other systems reviewed and are negative.         Objective   Ht 5' 3\" (1.6 m)   Wt 95.7 kg (211 lb)   BMI 37.38 kg/m²      Physical Exam  Constitutional:       Appearance: Normal appearance. She is obese.   HENT:      Head: Normocephalic and atraumatic.   Eyes:      Pupils: Pupils are equal, round, and reactive to light.   Cardiovascular:      Pulses: Normal pulses.   Pulmonary:      Effort: Pulmonary effort is normal.   Musculoskeletal:      Comments: Stork like appearance of her bilateral legs.  She has some peroneal function, she is able to maintain her foot in neutral but minimally against resistance from the peroneal compartment.  She does have dorsiflexor capacity available, she has severe equinus bilaterally she is unable to get to 90 degrees with knee extended.    Slightly decreased intrinsic muscle strength in her hands.   Skin:     Capillary Refill: Capillary refill takes 2 to 3 seconds.   Neurological:      General: No focal deficit present.      Mental Status: She is alert and oriented to person, place, and time. Mental status is at baseline.           "

## 2025-05-01 ENCOUNTER — EVALUATION (OUTPATIENT)
Dept: PHYSICAL THERAPY | Facility: CLINIC | Age: 50
End: 2025-05-01
Attending: PODIATRIST
Payer: COMMERCIAL

## 2025-05-01 DIAGNOSIS — M24.573 EQUINUS CONTRACTURE OF ANKLE: ICD-10-CM

## 2025-05-01 DIAGNOSIS — M72.2 PLANTAR FASCIITIS: ICD-10-CM

## 2025-05-01 DIAGNOSIS — G60.0 CHARCOT-MARIE-TOOTH DISEASE: ICD-10-CM

## 2025-05-01 PROCEDURE — 97161 PT EVAL LOW COMPLEX 20 MIN: CPT

## 2025-05-01 PROCEDURE — 97110 THERAPEUTIC EXERCISES: CPT

## 2025-05-01 NOTE — PROGRESS NOTES
PT Evaluation     Today's date: 2025  Patient name: Brandie Gloria  : 1975  MRN: [id number]  Referring provider: Jonas Looney*  Dx:   Encounter Diagnosis     ICD-10-CM    1. Equinus contracture of ankle  M24.573 Ambulatory referral to Physical Therapy      2. Plantar fasciitis  M72.2 Ambulatory referral to Physical Therapy      3. Charcot-Arelis-Tooth disease  G60.0 Ambulatory referral to Physical Therapy          Start Time:   Stop Time: 173  Total time in clinic (min): 45 minutes    Assessment  Impairments: abnormal gait, abnormal or restricted ROM, activity intolerance, impaired balance, impaired physical strength, lacks appropriate home exercise program, pain with function, poor body mechanics, participation limitations and activity limitations  Symptom irritability: moderate    Assessment details: Brandie Gloria is a 49 y.o. female presenting to outpatient physical therapy with noted impairments including R/L ankle/foot pain, impaired soft tissue mobility, reduced R/L ankle range of motion, reduced R/L ankle and foot strength, reduced postural awareness, and reduced activity tolerance. Noting significant bilateral pes cavus and equinus. Significantly impaired B DF AROM/PROM due to impaired talocrural joint mobility and soft tissue tightness. Noting tenderness upon palpation in R post tibialis insertion and plantar fascia. Weakness in all planes of B feet with MMT testing; more significant with eversion and inversion. Weakness noted throughout BLE along with neuropathic changes which may be contributed from CMT diagnosis; discussed with patient how this also may be impacting force distrubution in ankle, knee, hip joint. Due to noted impairments, the patient's present functional limitations include difficulty with ADLs/IADLs, reliance on medication and/or modalities for pain relief, reduced tolerance for functional mobility and activity, and difficulty completing HH responsibilities  "and hobbies. Patient to benefit from skilled outpatient physical therapy 1-2x/week for 6-8 weeks in order to reduce pain, maximize pain free range of motion, increase strength and stability, and improve functional mobility/functional activity in order to maximize function with reduced limitations. Increased time spent today reviewing PT exam findings and goals of PT treatment /POC. Discussed with patient how PT treatment will be focused on strengthening, stretching, and balance training. Home exercise program was provided and all questions answered to patient's level of satisfaction. Thank you for your referral.       Understanding of Dx/Px/POC: good     Prognosis: good    Goals  STGs to be achieved in 4 weeks:  1. Pt to demonstrate reduced subjective pain rating \"at worst\" by at least 2-3 points from Initial Eval in order to allow for reduced pain with ADLs and improved functional activity tolerance.   2. Pt to demonstrate increased PROM/AROM of R/L ankle in all deficient planes by at least 5-10 degrees in order to maximize joint mobility and function and allow for progression of exercise program and achievement of goals.   3. Pt to demonstrate increased MMT of R/L ankle in all deficient planes by at least 1/2-1 grade in order to improve safety and stability with ADLs and functional mobility.       LTGs to be achieved by discharge:  1. Pt will be I with HEP in order to continue to improve quality of life and independence and reduce risk for re-injury.   2. Pt to demonstrate return to hobby of walking/hiking without limitations or restrictions due to L foot pain.   3. Pt to demonstrate improved function as noted by achieving or exceeding predicted score on FOTO outcomes assessment tool.    4. Pt to report at least 75% improvement in symptoms with performance of IADLs/IADLs.   5. Pt to report at least 75% improvement in symptoms with work activities   6. Pt will demonstrate ability to perform stair climbing with " reciprocal pattern       Plan  Patient would benefit from: skilled physical therapy  Planned modality interventions: cryotherapy and thermotherapy: hydrocollator packs    Planned therapy interventions: abdominal trunk stabilization, IASTM, joint mobilization, manual therapy, massage, motor coordination training, balance, neuromuscular re-education, patient/caregiver education, postural training, strengthening, stretching, therapeutic activities, therapeutic exercise, home exercise program, gait training, flexibility, coordination and body mechanics training    Frequency: 1-2x week  Duration in weeks: 8  Plan of Care beginning date: 5/1/2025  Plan of Care expiration date: 7/10/2025  Treatment plan discussed with: patient      Subjective Evaluation    History of Present Illness  Mechanism of injury: Pt is presenting to OPPT with chief complaint of  R and L heel and foot pain. Reports this started in December. Reports she had x-ray and was diagnosed with heel spur and pf and was provided exercises. Reports she was seen by podiatry and was diagnosed with CMT. Reports that L leg is not as painful. Reports that she had neuropathic pain in L leg. Reports pain in R foot is around medial arch and feels deep. Reports pain is increased with walking, standing, and  being barefoot. 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 Reports she takes ibuprofen if needed. Reports numbness/tingling in B feet and calves. Reports that pain is alleviated with rest. Reports that she is always wearing sneakers or crocs. Reports that she loves to hike and run and reports she would run on treadmill and on ground and would like to get back to this.     Reports she has balance difficulties and ankle weakness.           Recurrent probem    Patient Goals  Patient goals for therapy: decreased pain, improved balance, increased motion, increased strength, independence with ADLs/IADLs and return to sport/leisure activities  Patient goal: get stronger  and prevent my diagnosis from progressing  Pain  Current pain ratin  At worst pain rating: 3  Location: R/L foot  Quality: dull ache, discomfort and throbbing  Relieving factors: rest, relaxation and medications  Aggravating factors: standing, walking, stair climbing and lifting  Progression: no change    Social Support  Steps to enter house: yes  Stairs in house: yes   12  Lives in: multiple-level home  Lives with: spouse    Employment status: working  Treatments  Current treatment: physical therapy        Objective     Tenderness   Left Ankle/Foot   No tenderness in the anterior ankle, anterior talofibular ligament, calcaneofibular ligament, deltoid ligament and plantar fascia.     Right Ankle/Foot   Tenderness in the deltoid ligament, plantar fascia and posterior tibial tendon. No tenderness in the anterior ankle, anterior talofibular ligament, calcaneofibular ligament and peroneal tendon.     Neurological Testing     Sensation     Ankle/Foot   Left Ankle/Foot   Intact: light touch    Right Ankle/Foot   Intact: light touch     Reflexes   Left   Clonus sign: negative    Right   Clonus sign: negative    Active Range of Motion   Left Ankle/Foot   Dorsiflexion (ke): 4 degrees   Plantar flexion: 60 degrees   Inversion: 25 degrees   Eversion: 25 degrees     Right Ankle/Foot   Dorsiflexion (ke): 5 degrees   Plantar flexion: 58 degrees   Inversion: 27 degrees   Eversion: 28 degrees     Passive Range of Motion   Left Ankle/Foot    Dorsiflexion (ke): 5 degrees   Plantar flexion: 65 degrees   Inversion: 30 degrees   Eversion: 30 degrees     Right Ankle/Foot    Dorsiflexion (ke): 6 degrees   Plantar flexion: 65 degrees   Inversion: 25 degrees with pain  Eversion: 30 degrees with pain    Strength/Myotome Testing     Left Hip   Planes of Motion   Flexion: 4-  Extension: 4-  Abduction: 4-  Adduction: 4  External rotation: 4  Internal rotation: 4    Right Hip   Planes of Motion   Flexion: 4-  Extension: 4-  Abduction:  4-  Adduction: 4  External rotation: 4  Internal rotation: 4    Left Knee   Flexion: 4  Extension: 4  Quadriceps contraction: good    Right Knee   Flexion: 4  Extension: 4  Quadriceps contraction: good    Left Ankle/Foot   Dorsiflexion: 4  Plantar flexion: 4  Inversion: 4  Eversion: 4  Great toe flexion: 4-  Great toe extension: 4-    Right Ankle/Foot   Dorsiflexion: 4  Plantar flexion: 4  Inversion: 4-  Eversion: 4-  Great toe flexion: 4-  Great toe extension: 4-    Tests   Left Ankle/Foot   Negative for anterior drawer, calcaneal squeeze, inversion talar tilt, metatarsal squeeze, posterior drawer and Tinel's sign (tarsal tunnel).     Right Ankle/Foot   Negative for anterior drawer, calcaneal squeeze, inversion talar tilt, metatarsal squeeze, posterior drawer and Tinel's sign (tarsal tunnel).     Additional Tests Details  Noting significant equinus B feet, pes cavus bilaterally                Date 5/1/2025        Visit Count 1       FOTO 5/1/2025        Pain In See IE       Pain Out See IE           Manuals 5/1/2025        PROM R ankle                                 Neuro Re-Ed        Toe scrunches Reviewed HEP       Toe yoga Reviewed HEP       Arch lifts Reviewed HEP       Fwd,lat lunge onto bosu        Tandem stance on foam        Tidal tank exercises         Wobble board fwd/lat                         Ther Ex        Self pf stretch        Calf stretch Reviewed HEP       HR with eccentric lowering        HR with ball squeeze at heel        Ankle AROM with tb        Great Toe ext/flx with tb        Step ups     Step downs                                 Ther Activity                        Gait Training                        Modalities        CP                      5/1/2025  - HEP was issued and reviewed this date for above noted exercises. Pt demonstrated understanding without incident and without questions/concerns. Will continue to update upcoming.

## 2025-05-04 ENCOUNTER — OFFICE VISIT (OUTPATIENT)
Dept: URGENT CARE | Facility: CLINIC | Age: 50
End: 2025-05-04
Payer: COMMERCIAL

## 2025-05-04 VITALS
DIASTOLIC BLOOD PRESSURE: 82 MMHG | OXYGEN SATURATION: 96 % | RESPIRATION RATE: 18 BRPM | TEMPERATURE: 97.9 F | HEART RATE: 74 BPM | SYSTOLIC BLOOD PRESSURE: 130 MMHG

## 2025-05-04 DIAGNOSIS — S39.012A STRAIN OF MUSCLE, FASCIA AND TENDON OF LOWER BACK, INITIAL ENCOUNTER: Primary | ICD-10-CM

## 2025-05-04 PROCEDURE — 99213 OFFICE O/P EST LOW 20 MIN: CPT

## 2025-05-04 PROCEDURE — 96372 THER/PROPH/DIAG INJ SC/IM: CPT

## 2025-05-04 RX ORDER — DEXAMETHASONE SODIUM PHOSPHATE 10 MG/ML
10 INJECTION, SOLUTION INTRAMUSCULAR; INTRAVENOUS ONCE
Status: COMPLETED | OUTPATIENT
Start: 2025-05-04 | End: 2025-05-04

## 2025-05-04 RX ADMIN — DEXAMETHASONE SODIUM PHOSPHATE 10 MG: 10 INJECTION, SOLUTION INTRAMUSCULAR; INTRAVENOUS at 19:30

## 2025-05-04 NOTE — PROGRESS NOTES
[facility]        NAME: Brandie Gloria is a 49 y.o. female  : 1975    MRN: [id number]  DATE: May 4, 2025  TIME: 7:28 PM    Assessment and Plan   Strain of muscle, fascia and tendon of lower back, initial encounter [S39.012A]  1. Strain of muscle, fascia and tendon of lower back, initial encounter  dexamethasone (PF) (DECADRON) injection 10 mg        Discussed with patient that physical exam with findings suggestive of musculoskeletal origin of low back pain. Steroid injection given during clinic visit. Educated patient to engage in light stretching and exercising after acute flair begins to resolve. Work note given.    Patient Instructions   Rest (for no longer than 24 hours)  Stretching exercises  Alternate ice and heat  Consider physical therapy if no improvement after 1 week  Follow up with chiropractic or orthopedic if continues >1 month    Follow up with PCP in 3-5 days.  Proceed to  ER if symptoms worsen.    If tests have been performed at [facility], our office will contact you with results if changes need to be made to the care plan discussed with you at the visit.  You can review your full results on [facility] MyChart.    Chief Complaint     Chief Complaint   Patient presents with    Back Pain     Bent over to pick something up and felt pain in center of lower back          History of Present Illness       48 yo F with PMH of HTN presenting with acute lower back pain x 1 day. Patient was in her yard bending over to clean up after her dog when she had acute pain in her bilateral lower back. Patient states a similar event occurring last year which was resolved with rest and a steroid injection. She is denying sciatic type radiation, numbness, tingling, decreased ROM of lower back and legs.        Review of Systems   Review of Systems   Constitutional:  Negative for chills and fever.   HENT:  Negative for ear pain and sore throat.    Eyes:  Negative for pain and visual disturbance.    Respiratory:  Negative for cough and shortness of breath.    Cardiovascular:  Negative for chest pain and palpitations.   Gastrointestinal:  Negative for abdominal pain and vomiting.   Genitourinary:  Negative for difficulty urinating, dysuria and hematuria.   Musculoskeletal:  Positive for back pain. Negative for arthralgias, gait problem, joint swelling, neck pain and neck stiffness.   Skin:  Negative for color change and rash.   Neurological:  Negative for dizziness, seizures, syncope, weakness and numbness.   All other systems reviewed and are negative.        Current Medications       Current Outpatient Medications:     ALPRAZolam (XANAX) 0.5 mg tablet, Take 1 tablet (0.5 mg total) by mouth 3 (three) times a day as needed (as neded), Disp: 90 tablet, Rfl: 0    citalopram (CeleXA) 40 mg tablet, Take 1 tablet (40 mg total) by mouth daily, Disp: 90 tablet, Rfl: 1    ibuprofen (MOTRIN) 200 mg tablet, Take by mouth every 6 (six) hours as needed for mild pain, Disp: , Rfl:     omeprazole (PriLOSEC OTC) 20 MG tablet, Take 20 mg by mouth daily, Disp: , Rfl:     VITAMIN D PO, Take 1,000 Units by mouth daily, Disp: , Rfl:     lidocaine (Lidoderm) 5 %, Apply 1 patch topically over 12 hours daily Remove & Discard patch within 12 hours or as directed by MD, Disp: 10 patch, Rfl: 0    methocarbamol (ROBAXIN) 500 mg tablet, Take 1 tablet (500 mg total) by mouth 3 (three) times a day, Disp: 30 tablet, Rfl: 0    predniSONE 10 mg tablet, Take 3 tabs BID X 2 days, 2 tabs BID X 2 days, 1 tab BID X 2 days, 1 tab daily X 2 days, Disp: 26 tablet, Rfl: 0    Current Facility-Administered Medications:     dexamethasone (PF) (DECADRON) injection 10 mg, 10 mg, Intramuscular, Once,     Current Allergies     Allergies as of 05/04/2025 - Reviewed 05/04/2025   Allergen Reaction Noted    Pollen extract Allergic Rhinitis 11/13/2012            The following portions of the patient's history were reviewed and updated as appropriate: allergies,  current medications, past family history, past medical history, past social history, past surgical history and problem list.     Past Medical History:   Diagnosis Date    Anxiety     Candidiasis of mouth     Last Assessed 21Apr2014    Depression     GERD (gastroesophageal reflux disease) 3/20/2024    Hypertension     Memory loss 4/7/2021    Obesity     Sciatica     Last Assessed [id number]    Skin rash     Last Assessed 19Mar2014    Urticaria     Last Assessed 21Mar2014       Past Surgical History:   Procedure Laterality Date    APPENDECTOMY LAPAROSCOPIC N/A 6/24/2023    Procedure: APPENDECTOMY LAPAROSCOPIC;  Surgeon: Sanford Garcia DO;  Location: [facility];  Service: General    DENTAL SURGERY         Family History   Problem Relation Age of Onset    Anxiety disorder Mother     Depression Mother     Osteoporosis Mother     Testicular cancer Father     Cancer Father     Hypertension Father     Alcohol abuse Paternal Grandfather     Mental illness Paternal Grandmother     Cancer Family     Diabetes Family     Heart disease Family     Stroke Family         Stroke syndrome    Drug abuse Neg Hx     Completed Suicide  Neg Hx          Medications have been verified.        Objective   /82   Pulse 74   Temp 97.9 °F (36.6 °C)   Resp 18   SpO2 96%   No LMP recorded.       Physical Exam     Physical Exam  Constitutional:       General: She is not in acute distress.     Appearance: Normal appearance. She is not ill-appearing.   HENT:      Head: Normocephalic and atraumatic.      Nose: Nose normal.      Mouth/Throat:      Mouth: Mucous membranes are moist.      Pharynx: No oropharyngeal exudate or posterior oropharyngeal erythema.   Eyes:      Conjunctiva/sclera: Conjunctivae normal.      Pupils: Pupils are equal, round, and reactive to light.   Cardiovascular:      Rate and Rhythm: Normal rate and regular rhythm.      Pulses: Normal pulses.      Heart sounds: Normal heart sounds.   Pulmonary:      Effort: Pulmonary  effort is normal.      Breath sounds: Normal breath sounds.   Abdominal:      General: Abdomen is flat.      Palpations: Abdomen is soft.   Musculoskeletal:      Cervical back: Normal.      Thoracic back: Normal.      Lumbar back: Spasms and tenderness present. No swelling, edema, deformity, signs of trauma, lacerations or bony tenderness. Normal range of motion. Negative right straight leg raise test and negative left straight leg raise test.        Back:    Skin:     General: Skin is warm and dry.      Capillary Refill: Capillary refill takes less than 2 seconds.   Neurological:      General: No focal deficit present.      Mental Status: She is alert and oriented to person, place, and time.

## 2025-05-04 NOTE — PATIENT INSTRUCTIONS
Rest (for no longer than 24 hours)  Stretching exercises  Alternate ice and heat  Consider physical therapy if no improvement after 1 week  Follow up with chiropractic or orthopedic if continues >1 month

## 2025-05-04 NOTE — LETTER
May 4, 2025     Patient: Brandie Gloria   YOB: 1975   Date of Visit: 5/4/2025       To Whom it May Concern:    Brandie Gloria was seen in my clinic on 5/4/2025. She may return to work on 5/6/2025 .    If you have any questions or concerns, please don't hesitate to call.         Sincerely,          Kurtis Ramon PA-C        CC: No Recipients

## 2025-05-08 ENCOUNTER — OFFICE VISIT (OUTPATIENT)
Dept: PHYSICAL THERAPY | Facility: CLINIC | Age: 50
End: 2025-05-08
Attending: PODIATRIST
Payer: COMMERCIAL

## 2025-05-08 DIAGNOSIS — M24.573 EQUINUS CONTRACTURE OF ANKLE: Primary | ICD-10-CM

## 2025-05-08 DIAGNOSIS — M72.2 PLANTAR FASCIITIS: ICD-10-CM

## 2025-05-08 DIAGNOSIS — G60.0 CHARCOT-MARIE-TOOTH DISEASE: ICD-10-CM

## 2025-05-08 PROCEDURE — 97110 THERAPEUTIC EXERCISES: CPT

## 2025-05-08 PROCEDURE — 97140 MANUAL THERAPY 1/> REGIONS: CPT

## 2025-05-08 PROCEDURE — 97112 NEUROMUSCULAR REEDUCATION: CPT

## 2025-05-08 NOTE — PROGRESS NOTES
"Daily Note     Today's date: 2025  Patient name: Brandie Gloria  : 1975  MRN: [id number]  Referring provider: Jonas Looney*  Dx:   Encounter Diagnosis     ICD-10-CM    1. Equinus contracture of ankle  M24.573       2. 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 I have been doing the exercises at home and they are hard but good\"       Objective: See treatment diary below      Assessment: Tolerated treatment well. Able to complete POC without incident. Requiring frequent verbal and visual cues for proper activation of foot/to intrinsic musculature; impaired activation and strength. Appropriately challenged with pertubation training and balance training on compliant surfaces; noting impaired righting reactions with pertubation training. Quick muscle fatigue with performance of PREs. Noting foot/ankle joint hypomobility addressed with manual therapy. Pt reporting no increase in symptoms at end of session. Will cont to progress as tolerated.  Patient demonstrated fatigue post treatment and would benefit from continued PT      Plan: Continue per plan of care.  Progress treatment as tolerated.         Date 2025      Visit Count 1 2      FOTO 2025        Pain In See IE       Pain Out See IE           Manuals 2025      PROM R ankle   PROM R ankle, subtalar joint mobilization figure 8, TC distraction 10' Bilateral                              Neuro Re-Ed        Toe scrunches Reviewed HEP X20 seated with cues      Toe yoga Reviewed HEP X20 seated with cues      Arch lifts Reviewed HEP X20 seated with cues      Fwd,lat lunge onto bosu        Tandem stance on foam  Tandem stance on foam 2x30\"       Tidal tank exercises   Tandem stance with TT 3x30\" ea      Wobble board fwd/lat                         Ther Ex        Nustep  L3 10'    " "  Self pf stretch        Calf stretch Reviewed HEP With wedge 4x30\"       HR with eccentric lowering        HR with ball squeeze at heel        Ankle AROM with tb  Green tb 2x10 df/ev/inv B ankle       Great Toe ext/flx with tb        Step ups     Step downs                                 Ther Activity                        Gait Training                        Modalities        CP                      5/1/2025  - HEP was issued and reviewed this date for above noted exercises. Pt demonstrated understanding without incident and without questions/concerns. Will continue to update upcoming.         "

## 2025-05-12 ENCOUNTER — OFFICE VISIT (OUTPATIENT)
Dept: PHYSICAL THERAPY | Facility: CLINIC | Age: 50
End: 2025-05-12
Attending: PODIATRIST
Payer: COMMERCIAL

## 2025-05-12 DIAGNOSIS — G60.0 CHARCOT-MARIE-TOOTH DISEASE: ICD-10-CM

## 2025-05-12 DIAGNOSIS — M24.573 EQUINUS CONTRACTURE OF ANKLE: Primary | ICD-10-CM

## 2025-05-12 DIAGNOSIS — M72.2 PLANTAR FASCIITIS: ICD-10-CM

## 2025-05-12 PROCEDURE — 97140 MANUAL THERAPY 1/> REGIONS: CPT

## 2025-05-12 PROCEDURE — 97110 THERAPEUTIC EXERCISES: CPT

## 2025-05-12 NOTE — PROGRESS NOTES
"Daily Note     Today's date: 2025  Patient name: Brandie Gloria  : 1975  MRN: [id number]  Referring provider: Jonas Looney*  Dx:   Encounter Diagnosis     ICD-10-CM    1. Equinus contracture of ankle  M24.573       2. 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 Able to complete POC without incident. Noting steady improvement in foot/toe intrinsic strength and coordination; requiring occasional cues this session. Continues to be challenged with pertubation training; LLE>RLE. Noting continued hypomobility of TC and subtalar joint but improved after manuals. Continues with quick muscle fatigue with performance of PREs. Will cont to progress as tolerated. Patient demonstrated fatigue post treatment and would benefit from continued PT      Plan: Continue per plan of care.  Progress treatment as tolerated.         Date 2025     Visit Count 1 2 3     FOTO 2025        Pain In See IE       Pain Out See IE           Manuals 2025     PROM R ankle   PROM R ankle, subtalar joint mobilization figure 8, TC distraction 10' Bilateral PROM R ankle, subtalar joint mobilization figure 8, TC distraction 10' Bilateral                             Neuro Re-Ed        Toe scrunches Reviewed HEP X20 seated with cues X20 seated with cues     Toe yoga Reviewed HEP X20 seated with cues X20 seated with cues     Arch lifts Reviewed HEP X20 seated with cues X20 seated with cues     Fwd,lat lunge onto bosu        Tandem stance on foam  Tandem stance on foam 2x30\"       Tidal tank exercises   Tandem stance with TT 3x30\" ea Tandem stance with TT 3x30\" ea     Wobble board fwd/lat                         Ther Ex        Nustep  L3 10' L3 10'      Self pf stretch   " "     Calf stretch Reviewed HEP With wedge 4x30\"  With wedge 4x30\"      HR with eccentric lowering        HR with ball squeeze at heel        Ankle AROM with tb  Green tb 2x10 df/ev/inv B ankle  Green tb 2x10 df/ev/inv B ankle      Great Toe ext/flx with tb        Step ups     Step downs    Step down x15 ea 4\"                             Ther Activity                        Gait Training                        Modalities        CP                      5/1/2025  - HEP was issued and reviewed this date for above noted exercises. Pt demonstrated understanding without incident and without questions/concerns. Will continue to update upcoming.           "

## 2025-05-19 ENCOUNTER — OFFICE VISIT (OUTPATIENT)
Dept: PHYSICAL THERAPY | Facility: CLINIC | Age: 50
End: 2025-05-19
Attending: PODIATRIST
Payer: COMMERCIAL

## 2025-05-19 DIAGNOSIS — G60.0 CHARCOT-MARIE-TOOTH DISEASE: ICD-10-CM

## 2025-05-19 DIAGNOSIS — M72.2 PLANTAR FASCIITIS: ICD-10-CM

## 2025-05-19 DIAGNOSIS — M24.573 EQUINUS CONTRACTURE OF ANKLE: Primary | ICD-10-CM

## 2025-05-19 PROCEDURE — 97140 MANUAL THERAPY 1/> REGIONS: CPT

## 2025-05-19 PROCEDURE — 97110 THERAPEUTIC EXERCISES: CPT

## 2025-05-19 NOTE — PROGRESS NOTES
"Daily Note     Today's date: 2025  Patient name: Brandie Gloria  : 1975  MRN: [id number]  Referring provider: Jonas Looney*  Dx:   Encounter Diagnosis     ICD-10-CM    1. Equinus contracture of ankle  M24.573       2. 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 Able to complete POC without incident. Continued improvement in foot/toe intrinsic strength and motor coordination. Pt appropriately challenged with pertubation training. Significantly challenged with tandem walking and with single leg balance and required external assistance occasional for balance recovery. Noting weakness in R peroneals. Quick muscle fatigue overall. Reporting decrease in pain end of session. Will cont to progress as tolerated. Patient demonstrated fatigue post treatment and would benefit from continued PT      Plan: Continue per plan of care.  Progress treatment as tolerated.         Date 2025    Visit Count 1 2 3 4    FOTO 2025        Pain In See IE       Pain Out See IE           Manuals 2025    PROM R ankle   PROM R ankle, subtalar joint mobilization figure 8, TC distraction 10' Bilateral PROM R ankle, subtalar joint mobilization figure 8, TC distraction 10' Bilateral PROM R ankle, subtalar joint mobilization figure 8, TC distraction 10' Bilateral                            Neuro Re-Ed        Toe scrunches Reviewed HEP X20 seated with cues X20 seated with cues X20 seated with cues    Toe yoga Reviewed HEP X20 seated with cues X20 seated with cues X20 seated with cues    Arch lifts Reviewed HEP X20 seated with cues X20 seated with cues X20 seated " "with cues    Fwd,lat lunge onto bosu        Tandem stance on foam  Tandem stance on foam 2x30\"       Tidal tank exercises   Tandem stance with TT 3x30\" ea Tandem stance with TT 3x30\" ea     Wobble board fwd/lat             Quick turns with TT x6 10 ft        Tandem walking on foam with 4 balance stones after x6 laps    Ther Ex        Nustep  L3 10' L3 10'  L3 10'     Self pf stretch        Calf stretch Reviewed HEP With wedge 4x30\"  With wedge 4x30\"  With wedge 4x30\"     HR with eccentric lowering        HR with ball squeeze at heel        Ankle AROM with tb  Green tb 2x10 df/ev/inv B ankle  Green tb 2x10 df/ev/inv B ankle  Green tb 2x10 df/ev/inv B ankle     Great Toe ext/flx with tb        Step ups     Step downs    Step down x15 ea 4\" Step down x15 ea 4\"                            Ther Activity                        Gait Training                        Modalities        CP                      5/1/2025  - HEP was issued and reviewed this date for above noted exercises. Pt demonstrated understanding without incident and without questions/concerns. Will continue to update upcoming.             "

## 2025-05-22 ENCOUNTER — OFFICE VISIT (OUTPATIENT)
Dept: PHYSICAL THERAPY | Facility: CLINIC | Age: 50
End: 2025-05-22
Attending: PODIATRIST
Payer: COMMERCIAL

## 2025-05-22 DIAGNOSIS — M24.573 EQUINUS CONTRACTURE OF ANKLE: Primary | ICD-10-CM

## 2025-05-22 DIAGNOSIS — M72.2 PLANTAR FASCIITIS: ICD-10-CM

## 2025-05-22 DIAGNOSIS — G60.0 CHARCOT-MARIE-TOOTH DISEASE: ICD-10-CM

## 2025-05-22 PROCEDURE — 97110 THERAPEUTIC EXERCISES: CPT

## 2025-05-22 PROCEDURE — 97112 NEUROMUSCULAR REEDUCATION: CPT

## 2025-05-22 PROCEDURE — 97140 MANUAL THERAPY 1/> REGIONS: CPT

## 2025-05-22 NOTE — PROGRESS NOTES
"Daily Note     Today's date: 2025  Patient name: Brandie Gloria  : 1975  MRN: [id number]  Referring provider: Jonas Looney*  Dx:   Encounter Diagnosis     ICD-10-CM    1. Equinus contracture of ankle  M24.573       2. 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 Able to complete POC without incident. Pt appropriately challenged with pertubation training. Continues with impaired eccentric control and strength with step downs with noted impaired stability. Pain with ankle eversion against resistance today with noted weakness in all planes. Decrease in symptoms at end of session. Will cont to progress as tolerated. Patient demonstrated fatigue post treatment and would benefit from continued PT      Plan: Continue per plan of care.  Progress treatment as tolerated.         Date 2025   Visit Count 1 2 3 4 5   FOTO 2025        Pain In See IE       Pain Out See IE           Manuals 2025   PROM R ankle   PROM R ankle, subtalar joint mobilization figure 8, TC distraction 10' Bilateral PROM R ankle, subtalar joint mobilization figure 8, TC distraction 10' Bilateral PROM R ankle, subtalar joint mobilization figure 8, TC distraction 10' Bilateral PROM R ankle, subtalar joint mobilization figure 8, TC distraction 10' Bilateral                           Neuro Re-Ed        Toe scrunches Reviewed HEP X20 seated with cues X20 seated with cues X20 seated with cues X20 seated with cues   Toe yoga Reviewed HEP X20 seated with cues X20 seated with cues X20 seated with cues X20 seated with cues   Arch lifts Reviewed HEP X20 seated with cues X20 seated with cues X20 seated with cues X20 seated " "with cues   Fwd,lat lunge onto bosu        Tandem stance on foam  Tandem stance on foam 2x30\"       Tidal tank exercises   Tandem stance with TT 3x30\" ea Tandem stance with TT 3x30\" ea     Wobble board fwd/lat             Quick turns with TT x6 10 ft TT HKM x4 laps           Tandem walking on foam with 4 balance stones after x6 laps Tandem walking on foam with 4 balance stones after x6 laps   Ther Ex        Nustep  L3 10' L3 10'  L3 10'  L3 10'    Self pf stretch        Calf stretch Reviewed HEP With wedge 4x30\"  With wedge 4x30\"  With wedge 4x30\"  With wedge 4x30\"    HR with eccentric lowering        HR with ball squeeze at heel        Ankle AROM with tb  Green tb 2x10 df/ev/inv B ankle  Green tb 2x10 df/ev/inv B ankle  Green tb 2x10 df/ev/inv B ankle  Green tb 2x10 df/ev/inv B ankle    Great Toe ext/flx with tb        Step ups     Step downs    Step down x15 ea 4\" Step down x15 ea 4\" Step down x15 ea 4\"                           Ther Activity                        Gait Training                        Modalities        CP                      5/1/2025  - HEP was issued and reviewed this date for above noted exercises. Pt demonstrated understanding without incident and without questions/concerns. Will continue to update upcoming.               "

## 2025-05-29 ENCOUNTER — OFFICE VISIT (OUTPATIENT)
Dept: PHYSICAL THERAPY | Facility: CLINIC | Age: 50
End: 2025-05-29
Attending: PODIATRIST
Payer: COMMERCIAL

## 2025-05-29 DIAGNOSIS — M24.573 EQUINUS CONTRACTURE OF ANKLE: Primary | ICD-10-CM

## 2025-05-29 DIAGNOSIS — G60.0 CHARCOT-MARIE-TOOTH DISEASE: ICD-10-CM

## 2025-05-29 DIAGNOSIS — M72.2 PLANTAR FASCIITIS: ICD-10-CM

## 2025-05-29 PROCEDURE — 97110 THERAPEUTIC EXERCISES: CPT

## 2025-05-29 PROCEDURE — 97140 MANUAL THERAPY 1/> REGIONS: CPT

## 2025-05-29 PROCEDURE — 97112 NEUROMUSCULAR REEDUCATION: CPT

## 2025-05-29 NOTE — PROGRESS NOTES
"Daily Note     Today's date: 2025  Patient name: Brandie Gloria  : 1975  MRN: [id number]  Referring provider: Jonas Looney*  Dx:   Encounter Diagnosis     ICD-10-CM    1. Equinus contracture of ankle  M24.573       2. Plantar fasciitis  M72.2       3. 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 Challenged with balance obstacle course adding cones; improved with repetition.  Pain noted performing inversion and eversion on R vs L.  Will continue to monitor and progress as able.  Patient demonstrated fatigue post treatment and would benefit from continued PT      Plan: Continue per plan of care.  Progress treatment as tolerated.         Date 2025   Visit Count 1 2 3 4 5   FOTO 2025        Pain In See IE       Pain Out See IE           Manuals 2025   PROM R ankle  PROM R ankle, subtalar joint mobilization figure 8, TC distraction 10' Bilateral PROM R ankle, subtalar joint mobilization figure 8, TC distraction 10' Bilateral PROM R ankle, subtalar joint mobilization figure 8, TC distraction 10' Bilateral PROM R ankle, subtalar joint mobilization figure 8, TC distraction 10' Bilateral PROM R ankle, subtalar joint mobilization figure 8, TC distraction 10' Bilateral                           Neuro Re-Ed        Toe scrunches X20 seated with cues       Toe yoga X20 seated with cues       Arch lifts X20 seated with cues       Fwd,lat lunge onto bosu        Tandem stance on foam        Tidal tank exercises         Wobble board fwd/lat          TT HKM x4 laps        Tandem walking on foam  4 cones with 4 balance stones after x6 laps       Ther Ex        Nustep L3 10'        Self pf stretch        Calf stretch With wedge 4x30\"        HR with eccentric lowering  " "      HR with ball squeeze at heel        Ankle AROM with tb Green tb 2x10 df/ev/inv B ankle        Great Toe ext/flx with tb        Step ups     Step downs  Step down x15 ea 4\"                               Ther Activity                        Gait Training                        Modalities        CP                      5/1/2025  - HEP was issued and reviewed this date for above noted exercises. Pt demonstrated understanding without incident and without questions/concerns. Will continue to update upcoming.                 "

## 2025-06-02 ENCOUNTER — OFFICE VISIT (OUTPATIENT)
Dept: PHYSICAL THERAPY | Facility: CLINIC | Age: 50
End: 2025-06-02
Attending: PODIATRIST
Payer: COMMERCIAL

## 2025-06-02 DIAGNOSIS — M24.573 EQUINUS CONTRACTURE OF ANKLE: Primary | ICD-10-CM

## 2025-06-02 DIAGNOSIS — G60.0 CHARCOT-MARIE-TOOTH DISEASE: ICD-10-CM

## 2025-06-02 DIAGNOSIS — M72.2 PLANTAR FASCIITIS: ICD-10-CM

## 2025-06-02 PROCEDURE — 97110 THERAPEUTIC EXERCISES: CPT

## 2025-06-02 PROCEDURE — 97112 NEUROMUSCULAR REEDUCATION: CPT

## 2025-06-02 PROCEDURE — 97140 MANUAL THERAPY 1/> REGIONS: CPT

## 2025-06-02 NOTE — PROGRESS NOTES
"Daily Note     Today's date: 2025  Patient name: Brandie Gloria  : 1975  MRN: [id number]  Referring provider: Jonas Looney*  Dx:   Encounter Diagnosis     ICD-10-CM    1. Equinus contracture of ankle  M24.573       2. 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 Able to complete program without change in symptoms.  Decreased balance throughout session with self correction.  Will continue to monitor and progress as able.  Patient demonstrated fatigue post treatment and would benefit from continued PT      Plan: Continue per plan of care.  Progress treatment as tolerated.         Date 2025      Visit Count 1 7      FOTO 2025        Pain In See IE       Pain Out See IE           Manuals 2025      PROM R ankle  PROM R ankle, subtalar joint mobilization figure 8, TC distraction 10' Bilateral PROM R ankle, subtalar joint mobilization figure 8, TC distraction 10' Bilateral                              Neuro Re-Ed        Toe scrunches X20 seated with cues       Toe yoga X20 seated with cues       Arch lifts X20 seated with cues       Fwd,lat lunge onto bosu        Tandem stance on foam        Tidal tank exercises         Wobble board fwd/lat          TT HKM x4 laps TT HKM x4 laps       Tandem walking on foam  4 cones with 4 balance stones after x6 laps Tandem walking on foam  4 cones with 4 balance stones after x6 laps      Ther Ex        Nustep L3 10'  L3 10'       Self pf stretch        Calf stretch With wedge 4x30\"  With wedge 4x30\"       HR with eccentric lowering        HR with ball squeeze at heel        Ankle AROM with tb Green tb 2x10 df/ev/inv B ankle        Great Toe ext/flx with tb    " "    Step ups     Step downs  Step down x15 ea 4\" Step down x20 ea 4\"                              Ther Activity                        Gait Training                        Modalities        CP                      5/1/2025  - HEP was issued and reviewed this date for above noted exercises. Pt demonstrated understanding without incident and without questions/concerns. Will continue to update upcoming.                   "

## 2025-06-03 ENCOUNTER — APPOINTMENT (OUTPATIENT)
Dept: PHYSICAL THERAPY | Facility: CLINIC | Age: 50
End: 2025-06-03
Attending: PODIATRIST
Payer: COMMERCIAL

## 2025-06-04 ENCOUNTER — OFFICE VISIT (OUTPATIENT)
Dept: PHYSICAL THERAPY | Facility: CLINIC | Age: 50
End: 2025-06-04
Attending: PODIATRIST
Payer: COMMERCIAL

## 2025-06-04 DIAGNOSIS — M72.2 PLANTAR FASCIITIS: ICD-10-CM

## 2025-06-04 DIAGNOSIS — M24.573 EQUINUS CONTRACTURE OF ANKLE: Primary | ICD-10-CM

## 2025-06-04 DIAGNOSIS — G60.0 CHARCOT-MARIE-TOOTH DISEASE: ICD-10-CM

## 2025-06-04 PROCEDURE — 97110 THERAPEUTIC EXERCISES: CPT

## 2025-06-04 PROCEDURE — 97112 NEUROMUSCULAR REEDUCATION: CPT

## 2025-06-04 NOTE — PROGRESS NOTES
"Daily Note     Today's date: 2025  Patient name: Brandie Gloria  : 1975  MRN: [id number]  Referring provider: Jonas Looney*  Dx:   Encounter Diagnosis     ICD-10-CM    1. Equinus contracture of ankle  M24.573       2. Plantar fasciitis  M72.2       3. 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 Able to complete POC without incident. Noting steady improvement in eccentric control/strength and righting reactions with step downs. Noting impaired ankle righting reactions and impaired balance with single leg stance on compliant surface; improved with reps and cues. Performed step ups on bosu for ankle stability and balance training; appropriately challenged with this. Discussed adding in isometrics at home for peroneal tendons due to reported pain in this area with ankle eversion. Also discussed stretch for eversion to stretch these muscles. Will cont to progress as tolerated. Patient demonstrated fatigue post treatment and would benefit from continued PT      Plan: Continue per plan of care.  Progress treatment as tolerated.         Date 2025     Visit Count 1 7 8     FOTO 2025        Pain In See IE       Pain Out See IE           Manuals 2025     PROM R ankle  PROM R ankle, subtalar joint mobilization figure 8, TC distraction 10' Bilateral PROM R ankle, subtalar joint mobilization figure 8, TC distraction 10' Bilateral NC                             Neuro Re-Ed        Toe scrunches X20 seated with cues       Toe yoga X20 seated with cues       Arch lifts X20 seated with cues       Fwd,lat lunge onto bosu        Tandem stance on foam        Tidal tank exercises         Wobble board fwd/lat          TT HKM x4 laps TT HKM x4 " "laps Step ups on bosu ball x10 ea      Tandem walking on foam  4 cones with 4 balance stones after x6 laps Tandem walking on foam  4 cones with 4 balance stones after x6 laps Single leg balance on foam 4x30\"      Ther Ex        Nustep L3 10'  L3 10'  L3 8'     Self pf stretch        Calf stretch With wedge 4x30\"  With wedge 4x30\"  With wedge 4x30\"      HR with eccentric lowering        HR with ball squeeze at heel        Ankle AROM with tb Green tb 2x10 df/ev/inv B ankle   Blue tb 2x10 df/ev/inv B ankle       X10 ankle isometric into R ankle eversion 5\" hold     Great Toe ext/flx with tb        Step ups     Step downs  Step down x15 ea 4\" Step down x20 ea 4\" Step down x20 ea 4\"                             Ther Activity                        Gait Training                        Modalities        CP                      5/1/2025  - HEP was issued and reviewed this date for above noted exercises. Pt demonstrated understanding without incident and without questions/concerns. Will continue to update upcoming.                     "

## 2025-06-05 ENCOUNTER — OFFICE VISIT (OUTPATIENT)
Dept: INTERNAL MEDICINE CLINIC | Facility: CLINIC | Age: 50
End: 2025-06-05
Payer: COMMERCIAL

## 2025-06-05 VITALS
DIASTOLIC BLOOD PRESSURE: 82 MMHG | HEIGHT: 63 IN | HEART RATE: 90 BPM | BODY MASS INDEX: 34.09 KG/M2 | WEIGHT: 192.4 LBS | SYSTOLIC BLOOD PRESSURE: 128 MMHG | TEMPERATURE: 97.5 F | OXYGEN SATURATION: 98 %

## 2025-06-05 DIAGNOSIS — Z12.31 ENCOUNTER FOR SCREENING MAMMOGRAM FOR BREAST CANCER: ICD-10-CM

## 2025-06-05 DIAGNOSIS — Z13.0 SCREENING FOR DEFICIENCY ANEMIA: ICD-10-CM

## 2025-06-05 DIAGNOSIS — R25.2 MUSCLE CRAMP: ICD-10-CM

## 2025-06-05 DIAGNOSIS — I10 PRIMARY HYPERTENSION: ICD-10-CM

## 2025-06-05 DIAGNOSIS — Z00.00 ANNUAL PHYSICAL EXAM: Primary | ICD-10-CM

## 2025-06-05 DIAGNOSIS — K21.9 GASTROESOPHAGEAL REFLUX DISEASE WITHOUT ESOPHAGITIS: ICD-10-CM

## 2025-06-05 DIAGNOSIS — Z13.29 SCREENING FOR THYROID DISORDER: ICD-10-CM

## 2025-06-05 DIAGNOSIS — E78.2 MIXED HYPERLIPIDEMIA: ICD-10-CM

## 2025-06-05 DIAGNOSIS — Z82.0 FAMILY HISTORY OF CHARCOT-MARIE-TOOTH DISEASE: ICD-10-CM

## 2025-06-05 DIAGNOSIS — Z01.419 ENCOUNTER FOR GYNECOLOGICAL EXAMINATION: ICD-10-CM

## 2025-06-05 DIAGNOSIS — E55.9 VITAMIN D DEFICIENCY: ICD-10-CM

## 2025-06-05 DIAGNOSIS — R29.898 REDUCED HAND STRENGTH: ICD-10-CM

## 2025-06-05 DIAGNOSIS — Z13.1 SCREENING FOR DIABETES MELLITUS: ICD-10-CM

## 2025-06-05 PROCEDURE — 99396 PREV VISIT EST AGE 40-64: CPT | Performed by: PHYSICIAN ASSISTANT

## 2025-06-05 RX ORDER — OMEPRAZOLE 40 MG/1
40 CAPSULE, DELAYED RELEASE ORAL DAILY
Qty: 90 CAPSULE | Refills: 0 | Status: SHIPPED | OUTPATIENT
Start: 2025-06-05

## 2025-06-05 NOTE — PROGRESS NOTES
Adult Annual Physical  Name: Brandie Gloria      : 1975      MRN: [id number]  Encounter Provider: Evy Parnell PA-C  Encounter Date: 2025   Encounter department: [facility]    :  Assessment & Plan  Encounter for screening mammogram for breast cancer    Orders:  •  Mammo screening bilateral w 3d and cad; Future    Encounter for gynecological examination    Orders:  •  Ambulatory referral to Obstetrics / Gynecology; Future    Primary hypertension         Mixed hyperlipidemia    Orders:  •  Lipid panel; Future    Screening for deficiency anemia    Orders:  •  CBC and differential; Future    Screening for thyroid disorder    Orders:  •  TSH, 3rd generation; Future    Vitamin D deficiency    Orders:  •  Vitamin D 25 hydroxy; Future    Screening for diabetes mellitus    Orders:  •  Comprehensive metabolic panel; Future  •  Hemoglobin A1C; Future    Family history of Charcot-Arelis-Tooth disease    Orders:  •  EMG; Future    Muscle cramp    Orders:  •  Vitamin B12; Future  •  Magnesium; Future  •  TIBC Panel (incl. Iron, TIBC, % Iron Saturation); Future    Reduced hand strength    Orders:  •  Ambulatory Referral to Occupational Therapy; Future    Gastroesophageal reflux disease without esophagitis    Orders:  •  omeprazole (PriLOSEC) 40 MG capsule; Take 1 capsule (40 mg total) by mouth daily    Annual physical exam             Preventive Screenings:  - Diabetes Screening: orders placed  - Cholesterol Screening: screening not indicated and has hyperlipidemia   - Hepatitis C screening: screening up-to-date   - HIV screening: screening up-to-date   - Cervical cancer screening: screening not indicated   - Breast cancer screening: orders placed   - Colon cancer screening: screening up-to-date   - Lung cancer screening: screening not indicated     Counseling/Anticipatory Guidance:      Currently in the process of getting workup for Charcot Arelis Tooth disease       Depression Screening and  Follow-up Plan: Patient was screened for depression during today's encounter. They screened negative with a PHQ-9 score of 0.          History of Present Illness     Adult Annual Physical:  Patient presents for annual physical.     Diet and Physical Activity:  - Diet/Nutrition: low calorie diet.  - Exercise: no formal exercise.    Depression Screening:    - PHQ-9 Score: 0    General Health:  - Sleep: 7-8 hours of sleep on average and sleeps well.  - Hearing: normal hearing bilateral ears.  - Vision: wears glasses and most recent eye exam > 1 year ago.  - Dental: regular dental visits.    /GYN Health:  - Follows with GYN: no.   - History of STDs: no    Advanced Care Planning:  - Has an advanced directive?: no    - Has a durable medical POA?: no    - ACP document given to patient?: no      Review of Systems   Constitutional:  Negative for chills and fever.   HENT:  Negative for congestion, ear pain, hearing loss, postnasal drip, rhinorrhea, sinus pressure, sinus pain, sore throat and trouble swallowing.    Eyes:  Negative for pain and visual disturbance.   Respiratory:  Negative for cough, chest tightness, shortness of breath and wheezing.    Cardiovascular: Negative.  Negative for chest pain, palpitations and leg swelling.   Gastrointestinal:  Negative for abdominal pain, blood in stool, constipation, diarrhea, nausea and vomiting.   Endocrine: Negative for cold intolerance, heat intolerance, polydipsia, polyphagia and polyuria.   Genitourinary:  Negative for difficulty urinating, dysuria, flank pain and urgency.   Musculoskeletal:  Positive for gait problem and myalgias. Negative for arthralgias and back pain.   Skin:  Negative for rash.   Allergic/Immunologic: Negative.    Neurological:  Positive for weakness. Negative for dizziness, light-headedness and headaches.   Hematological: Negative.    Psychiatric/Behavioral:  Negative for behavioral problems, dysphoric mood and sleep disturbance. The patient is not  "nervous/anxious.      Medications Ordered Prior to Encounter[1]   Social History[2]    Objective   /82   Pulse 90   Temp 97.5 °F (36.4 °C)   Ht 5' 3\" (1.6 m)   Wt 87.3 kg (192 lb 6.4 oz)   SpO2 98%   BMI 34.08 kg/m²     Physical Exam  Vitals and nursing note reviewed.   Constitutional:       General: She is not in acute distress.     Appearance: Normal appearance. She is well-developed. She is not diaphoretic.   HENT:      Head: Normocephalic and atraumatic.      Right Ear: External ear normal.      Left Ear: External ear normal.      Nose: Nose normal.      Mouth/Throat:      Pharynx: No oropharyngeal exudate.     Eyes:      General: No scleral icterus.        Right eye: No discharge.         Left eye: No discharge.      Conjunctiva/sclera: Conjunctivae normal.      Pupils: Pupils are equal, round, and reactive to light.     Neck:      Thyroid: No thyromegaly.     Cardiovascular:      Rate and Rhythm: Normal rate and regular rhythm.      Heart sounds: Normal heart sounds. No murmur heard.     No friction rub. No gallop.   Pulmonary:      Effort: Pulmonary effort is normal. No respiratory distress.      Breath sounds: Normal breath sounds. No wheezing or rales.   Abdominal:      General: Bowel sounds are normal. There is no distension.      Palpations: Abdomen is soft.      Tenderness: There is no abdominal tenderness.     Musculoskeletal:         General: No tenderness or deformity. Normal range of motion.      Cervical back: Normal range of motion and neck supple.     Skin:     General: Skin is warm and dry.     Neurological:      Mental Status: She is alert and oriented to person, place, and time.      Cranial Nerves: No cranial nerve deficit.     Psychiatric:         Behavior: Behavior normal.         Thought Content: Thought content normal.         Judgment: Judgment normal.                [1]  Current Outpatient Medications on File Prior to Visit   Medication Sig Dispense Refill   • ALPRAZolam " (XANAX) 0.5 mg tablet Take 1 tablet (0.5 mg total) by mouth 3 (three) times a day as needed (as neded) 90 tablet 0   • citalopram (CeleXA) 40 mg tablet Take 1 tablet (40 mg total) by mouth daily 90 tablet 1   • ibuprofen (MOTRIN) 200 mg tablet Take by mouth every 6 (six) hours as needed for mild pain     • VITAMIN D PO Take 1,000 Units by mouth in the morning.     • [DISCONTINUED] lidocaine (Lidoderm) 5 % Apply 1 patch topically over 12 hours daily Remove & Discard patch within 12 hours or as directed by MD 10 patch 0   • [DISCONTINUED] methocarbamol (ROBAXIN) 500 mg tablet Take 1 tablet (500 mg total) by mouth 3 (three) times a day 30 tablet 0   • [DISCONTINUED] predniSONE 10 mg tablet Take 3 tabs BID X 2 days, 2 tabs BID X 2 days, 1 tab BID X 2 days, 1 tab daily X 2 days 26 tablet 0     No current facility-administered medications on file prior to visit.   [2]  Social History  Tobacco Use   • Smoking status: Never     Passive exposure: Never   • Smokeless tobacco: Never   Vaping Use   • Vaping status: Never Used   Substance and Sexual Activity   • Alcohol use: Yes     Alcohol/week: 9.0 - 12.0 standard drinks of alcohol     Types: 9 - 12 Glasses of wine per week     Comment: off and on   • Drug use: No   • Sexual activity: Yes

## 2025-06-06 NOTE — PATIENT INSTRUCTIONS
"Patient Education     Routine physical for adults   The Basics   Written by the doctors and editors at [facility]   What is a physical? -- A physical is a routine visit, or \"check-up,\" with your doctor. You might also hear it called a \"wellness visit\" or \"preventive visit.\"  During each visit, the doctor will:   Ask about your physical and mental health   Ask about your habits, behaviors, and lifestyle   Do an exam   Give you vaccines if needed   Talk to you about any medicines you take   Give advice about your health   Answer your questions  Getting regular check-ups is an important part of taking care of your health. It can help your doctor find and treat any problems you have. But it's also important for preventing health problems.  A routine physical is different from a \"sick visit.\" A sick visit is when you see a doctor because of a health concern or problem. Since physicals are scheduled ahead of time, you can think about what you want to ask the doctor.  How often should I get a physical? -- It depends on your age and health. In general, for people age 21 years and older:   If you are younger than 50 years, you might be able to get a physical every 3 years.   If you are 50 years or older, your doctor might recommend a physical every year.  If you have an ongoing health condition, like diabetes or high blood pressure, your doctor will probably want to see you more often.  What happens during a physical? -- In general, each visit will include:   Physical exam - The doctor or nurse will check your height, weight, heart rate, and blood pressure. They will also look at your eyes and ears. They will ask about how you are feeling and whether you have any symptoms that bother you.   Medicines - It's a good idea to bring a list of all the medicines you take to each doctor visit. Your doctor will talk to you about your medicines and answer any questions. Tell them if you are having any side effects that bother you. You " "should also tell them if you are having trouble paying for any of your medicines.   Habits and behaviors - This includes:   Your diet   Your exercise habits   Whether you smoke, drink alcohol, or use drugs   Whether you are sexually active   Whether you feel safe at home  Your doctor will talk to you about things you can do to improve your health and lower your risk of health problems. They will also offer help and support. For example, if you want to quit smoking, they can give you advice and might prescribe medicines. If you want to improve your diet or get more physical activity, they can help you with this, too.   Lab tests, if needed - The tests you get will depend on your age and situation. For example, your doctor might want to check your:   Cholesterol   Blood sugar   Iron level   Vaccines - The recommended vaccines will depend on your age, health, and what vaccines you already had. Vaccines are very important because they can prevent certain serious or deadly infections.   Discussion of screening - \"Screening\" means checking for diseases or other health problems before they cause symptoms. Your doctor can recommend screening based on your age, risk, and preferences. This might include tests to check for:   Cancer, such as breast, prostate, cervical, ovarian, colorectal, prostate, lung, or skin cancer   Sexually transmitted infections, such as chlamydia and gonorrhea   Mental health conditions like depression and anxiety  Your doctor will talk to you about the different types of screening tests. They can help you decide which screenings to have. They can also explain what the results might mean.   Answering questions - The physical is a good time to ask the doctor or nurse questions about your health. If needed, they can refer you to other doctors or specialists, too.  Adults older than 65 years often need other care, too. As you get older, your doctor will talk to you about:   How to prevent falling at " home   Hearing or vision tests   Memory testing   How to take your medicines safely   Making sure that you have the help and support you need at home  All topics are updated as new evidence becomes available and our peer review process is complete.  This topic retrieved from [employer] on: May 02, 2024.  Topic 411739 Version 1.0  Release: 32.4.3 - C32.122  © 2024 UpToDate, [employer] and/or its affiliates. All rights reserved.  Consumer Information Use and Disclaimer   Disclaimer: This generalized information is a limited summary of diagnosis, treatment, and/or medication information. It is not meant to be comprehensive and should be used as a tool to help the user understand and/or assess potential diagnostic and treatment options. It does NOT include all information about conditions, treatments, medications, side effects, or risks that may apply to a specific patient. It is not intended to be medical advice or a substitute for the medical advice, diagnosis, or treatment of a health care provider based on the health care provider's examination and assessment of a patient's specific and unique circumstances. Patients must speak with a health care provider for complete information about their health, medical questions, and treatment options, including any risks or benefits regarding use of medications. This information does not endorse any treatments or medications as safe, effective, or approved for treating a specific patient. Up[employer]Date, Inc[employer] and its affiliates disclaim any warranty or liability relating to this information or the use thereof.The use of this information is governed by the Terms of Use, available at https://www.woltersKoemeiuwer.com/en/know/clinical-effectiveness-terms. 2024© UpToDate, Inc. and its affiliates and/or licensors. All rights reserved.  Copyright   © 2024 [employer], [employer] and/or its affiliates. All rights reserved.

## 2025-06-09 ENCOUNTER — APPOINTMENT (OUTPATIENT)
Dept: PHYSICAL THERAPY | Facility: CLINIC | Age: 50
End: 2025-06-09
Attending: PODIATRIST
Payer: COMMERCIAL

## 2025-06-12 ENCOUNTER — OFFICE VISIT (OUTPATIENT)
Dept: PHYSICAL THERAPY | Facility: CLINIC | Age: 50
End: 2025-06-12
Attending: PODIATRIST
Payer: COMMERCIAL

## 2025-06-12 DIAGNOSIS — G60.0 CHARCOT-MARIE-TOOTH DISEASE: ICD-10-CM

## 2025-06-12 DIAGNOSIS — M72.2 PLANTAR FASCIITIS: Primary | ICD-10-CM

## 2025-06-12 DIAGNOSIS — M24.573 EQUINUS CONTRACTURE OF ANKLE: ICD-10-CM

## 2025-06-12 PROCEDURE — 97110 THERAPEUTIC EXERCISES: CPT

## 2025-06-12 PROCEDURE — 97112 NEUROMUSCULAR REEDUCATION: CPT

## 2025-06-12 NOTE — PROGRESS NOTES
"Daily Note     Today's date: 2025  Patient name: Brandie Gloria  : 1975  MRN: [id number]  Referring provider: Jonas Looney*  Dx:   Encounter Diagnosis     ICD-10-CM    1. Plantar fasciitis  M72.2       2. Equinus contracture of ankle  M24.573       3. 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 I still have some pain in my R foot. I feel I have good and bad days with my balance\"       Objective: See treatment diary below      Assessment: Tolerated treatment well. Able to complete POC without incident. Significant improvement in eccentric control/strength and righting reactions with step downs; requiring less HHA.  Performed step ups on bosu for ankle stability and balance training; appropriately challenged with this and noting improvement in righting reactions when compared to previous session. Improvement in pain in peroneal muscles. Noting significant challenge and instability with squats on bosu. Increase in muscle fatigue with performance of PREs. Will cont to progress as tolerated. Patient demonstrated fatigue post treatment and would benefit from continued PT      Plan: Continue per plan of care.  Progress treatment as tolerated.         Date 2025    Visit Count 1 7 8 9    FOTO 2025        Pain In See IE       Pain Out See IE           Manuals 2025    PROM R ankle  PROM R ankle, subtalar joint mobilization figure 8, TC distraction 10' Bilateral PROM R ankle, subtalar joint mobilization figure 8, TC distraction 10' Bilateral NC                             Neuro Re-Ed        Toe scrunches X20 seated with cues       Toe yoga X20 seated with cues       Arch lifts X20 seated with cues       Fwd,lat lunge onto bosu        Tandem stance on foam        Tidal tank exercises         Wobble board " "fwd/lat          TT HKM x4 laps TT HKM x4 laps Step ups on bosu ball x10 ea Step ups on bosu ball x15 ea    Squats on bosu x10     Tandem walking on foam  4 cones with 4 balance stones after x6 laps Tandem walking on foam  4 cones with 4 balance stones after x6 laps Single leg balance on foam 4x30\"  Single leg balance on foam 4x30\"     Ther Ex        Nustep L3 10'  L3 10'  L3 8' L3 8'     Self pf stretch        Calf stretch With wedge 4x30\"  With wedge 4x30\"  With wedge 4x30\"      HR with eccentric lowering        HR with ball squeeze at heel        Ankle AROM with tb Green tb 2x10 df/ev/inv B ankle   Blue tb 2x10 df/ev/inv B ankle       X10 ankle isometric into R ankle eversion 5\" hold Blue tb 2x10 df/ev/inv B ankle     Great Toe ext/flx with tb        Step ups     Step downs  Step down x15 ea 4\" Step down x20 ea 4\" Step down x20 ea 4\" Step down x20 ea 4\"        SLR supine with TA 2x10    SLR hip abduction 2x10                    Ther Activity                        Gait Training                        Modalities        CP                      5/1/2025  - HEP was issued and reviewed this date for above noted exercises. Pt demonstrated understanding without incident and without questions/concerns. Will continue to update upcoming.                       "

## 2025-06-16 ENCOUNTER — OFFICE VISIT (OUTPATIENT)
Dept: PHYSICAL THERAPY | Facility: CLINIC | Age: 50
End: 2025-06-16
Attending: PODIATRIST
Payer: COMMERCIAL

## 2025-06-16 DIAGNOSIS — M24.573 EQUINUS CONTRACTURE OF ANKLE: ICD-10-CM

## 2025-06-16 DIAGNOSIS — M72.2 PLANTAR FASCIITIS: Primary | ICD-10-CM

## 2025-06-16 DIAGNOSIS — G60.0 CHARCOT-MARIE-TOOTH DISEASE: ICD-10-CM

## 2025-06-16 PROCEDURE — 97110 THERAPEUTIC EXERCISES: CPT

## 2025-06-16 PROCEDURE — 97112 NEUROMUSCULAR REEDUCATION: CPT

## 2025-06-16 NOTE — PROGRESS NOTES
"Daily Note     Today's date: 2025  Patient name: Brandie Gloria  : 1975  MRN: [id number]  Referring provider: Jonas Looney*  Dx:   Encounter Diagnosis     ICD-10-CM    1. Plantar fasciitis  M72.2       2. Equinus contracture of ankle  M24.573       3. 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 Able to complete POC without incident. Noting steady improvement in static/dynamic balance each session. Less HHA required to recover from mild LOB this session. Continues with quick muscle fatigue with performance of PREs. Able to progress step down height with good tolerance and improvement in righting reactions but was challenged with this increase in height. Improving single leg balance; able to maintain stance time on BLE for longer period without LOB or need for HHA. Will cont to progress as tolerated. Patient demonstrated fatigue post treatment and would benefit from continued PT      Plan: Continue per plan of care.  Progress treatment as tolerated.         Date 2025   Visit Count 1 7 8 9 10   FOTO 2025        Pain In See IE       Pain Out See IE           Manuals 2025   PROM R ankle  PROM R ankle, subtalar joint mobilization figure 8, TC distraction 10' Bilateral PROM R ankle, subtalar joint mobilization figure 8, TC distraction 10' Bilateral NC                             Neuro Re-Ed        Toe scrunches X20 seated with cues       Toe yoga X20 seated with cues       Arch lifts X20 seated with cues       Fwd,lat lunge onto bosu        Tandem stance on foam        Tidal tank exercises         Wobble board fwd/lat          TT HKM x4 laps TT HKM x4 laps Step ups on bosu ball x10 ea Step ups on bosu " "ball x15 ea    Squats on bosu x10 Step ups on bosu ball x20 ea on blue portion    Squats on bosu 2x10    Tandem walking on foam  4 cones with 4 balance stones after x6 laps Tandem walking on foam  4 cones with 4 balance stones after x6 laps Single leg balance on foam 4x30\"  Single leg balance on foam 4x30\"  Single leg balance on foam 4x30\"    Ther Ex        Nustep L3 10'  L3 10'  L3 8' L3 8'  L3 8'    Self pf stretch        Calf stretch With wedge 4x30\"  With wedge 4x30\"  With wedge 4x30\"      HR with eccentric lowering        HR with ball squeeze at heel        Ankle AROM with tb Green tb 2x10 df/ev/inv B ankle   Blue tb 2x10 df/ev/inv B ankle       X10 ankle isometric into R ankle eversion 5\" hold Blue tb 2x10 df/ev/inv B ankle  Blue tb 2x10 df/inv R ankle    Great Toe ext/flx with tb        Step ups     Step downs  Step down x15 ea 4\" Step down x20 ea 4\" Step down x20 ea 4\" Step down x20 ea 4\" Step down x15 ea 6\"       SLR supine with TA 2x10    SLR hip abduction 2x10 SLR supine with TA 2x10    SLR hip abduction 2x10      Bridges 2x10 3-5\" hold                   Ther Activity                        Gait Training                        Modalities        CP                      5/1/2025  - HEP was issued and reviewed this date for above noted exercises. Pt demonstrated understanding without incident and without questions/concerns. Will continue to update upcoming.                         "

## 2025-06-19 ENCOUNTER — OFFICE VISIT (OUTPATIENT)
Dept: PHYSICAL THERAPY | Facility: CLINIC | Age: 50
End: 2025-06-19
Attending: PODIATRIST
Payer: COMMERCIAL

## 2025-06-19 DIAGNOSIS — M24.573 EQUINUS CONTRACTURE OF ANKLE: ICD-10-CM

## 2025-06-19 DIAGNOSIS — G60.0 CHARCOT-MARIE-TOOTH DISEASE: ICD-10-CM

## 2025-06-19 DIAGNOSIS — M72.2 PLANTAR FASCIITIS: Primary | ICD-10-CM

## 2025-06-19 PROCEDURE — 97112 NEUROMUSCULAR REEDUCATION: CPT

## 2025-06-19 PROCEDURE — 97110 THERAPEUTIC EXERCISES: CPT

## 2025-06-19 NOTE — PROGRESS NOTES
"Daily Note     Today's date: 2025  Patient name: Brandie Gloria  : 1975  MRN: [id number]  Referring provider: Jonas Looney*  Dx:   Encounter Diagnosis     ICD-10-CM    1. Plantar fasciitis  M72.2       2. 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 Pt progressed with > reps to brayan with TE completed today  Pt indicated overall decrease pain since SOC and end of rx session today Pt reported > confidence/balance and strength with progression of BOSU exer  Patient would benefit from continued PT      Plan: Continue per plan of care.        Date        Visit Count        FOTO        Pain In 1/10 R PF       Pain Out            Manuals        PROM R ankle                                 Neuro Re-Ed        Toe scrunches review       Toe yoga review       Arch lifts review       Fwd,lat lunge onto bosu        Tandem stance on foam Upcoming       Tidal tank exercises         Wobble board fwd/lat          Step ups on bosu ball x20 ea on blue portion    Squats on bosu 2x10        Single leg balance on foam 4x30\"        Ther Ex        Nustep L4 10'       Self pf stretch        Calf stretch 1 min  Incline board       HR with eccentric lowering        HR with ball squeeze at heel        Ankle AROM with tb Blue tb 2x10 df/inv/ev  R ankle        Great Toe ext/flx with tb        Step ups     Step downs  Step down x20 ea 6\"        SLR supine with TA 2x10    SLR hip abduction 2x10      Bridges 2x10 3-5\" hold                       Ther Activity                        Gait Training                        Modalities        CP                      2025  - HEP was issued and reviewed this date for above noted exercises. Pt demonstrated understanding without incident and without questions/concerns. Will continue " to update upcoming.

## 2025-06-20 ENCOUNTER — APPOINTMENT (OUTPATIENT)
Dept: LAB | Facility: CLINIC | Age: 50
End: 2025-06-20
Attending: PHYSICIAN ASSISTANT
Payer: COMMERCIAL

## 2025-06-20 DIAGNOSIS — E55.9 VITAMIN D DEFICIENCY: ICD-10-CM

## 2025-06-20 DIAGNOSIS — R25.2 MUSCLE CRAMP: ICD-10-CM

## 2025-06-20 DIAGNOSIS — Z13.1 SCREENING FOR DIABETES MELLITUS: ICD-10-CM

## 2025-06-20 DIAGNOSIS — Z13.29 SCREENING FOR THYROID DISORDER: ICD-10-CM

## 2025-06-20 DIAGNOSIS — Z13.0 SCREENING FOR DEFICIENCY ANEMIA: ICD-10-CM

## 2025-06-20 DIAGNOSIS — E78.2 MIXED HYPERLIPIDEMIA: ICD-10-CM

## 2025-06-20 LAB
25(OH)D3 SERPL-MCNC: 22.4 NG/ML (ref 30–100)
ALBUMIN SERPL BCG-MCNC: 4 G/DL (ref 3.5–5)
ALP SERPL-CCNC: 71 U/L (ref 34–104)
ALT SERPL W P-5'-P-CCNC: 17 U/L (ref 7–52)
ANION GAP SERPL CALCULATED.3IONS-SCNC: 3 MMOL/L (ref 4–13)
AST SERPL W P-5'-P-CCNC: 17 U/L (ref 13–39)
BASOPHILS # BLD AUTO: 0.02 THOUSANDS/ÂΜL (ref 0–0.1)
BASOPHILS NFR BLD AUTO: 0 % (ref 0–1)
BILIRUB SERPL-MCNC: 0.67 MG/DL (ref 0.2–1)
BUN SERPL-MCNC: 17 MG/DL (ref 5–25)
CALCIUM SERPL-MCNC: 9.1 MG/DL (ref 8.4–10.2)
CHLORIDE SERPL-SCNC: 104 MMOL/L (ref 96–108)
CHOLEST SERPL-MCNC: 238 MG/DL (ref ?–200)
CO2 SERPL-SCNC: 30 MMOL/L (ref 21–32)
CREAT SERPL-MCNC: 0.5 MG/DL (ref 0.6–1.3)
EOSINOPHIL # BLD AUTO: 0.07 THOUSAND/ÂΜL (ref 0–0.61)
EOSINOPHIL NFR BLD AUTO: 1 % (ref 0–6)
ERYTHROCYTE [DISTWIDTH] IN BLOOD BY AUTOMATED COUNT: 12.8 % (ref 11.6–15.1)
EST. AVERAGE GLUCOSE BLD GHB EST-MCNC: 111 MG/DL
GFR SERPL CREATININE-BSD FRML MDRD: 114 ML/MIN/1.73SQ M
GLUCOSE P FAST SERPL-MCNC: 106 MG/DL (ref 65–99)
HBA1C MFR BLD: 5.5 %
HCT VFR BLD AUTO: 42.7 % (ref 34.8–46.1)
HDLC SERPL-MCNC: 50 MG/DL
HGB BLD-MCNC: 13.7 G/DL (ref 11.5–15.4)
IMM GRANULOCYTES # BLD AUTO: 0.02 THOUSAND/UL (ref 0–0.2)
IMM GRANULOCYTES NFR BLD AUTO: 0 % (ref 0–2)
IRON SATN MFR SERPL: 27 % (ref 15–50)
IRON SERPL-MCNC: 82 UG/DL (ref 50–212)
LDLC SERPL CALC-MCNC: 166 MG/DL (ref 0–100)
LYMPHOCYTES # BLD AUTO: 1.77 THOUSANDS/ÂΜL (ref 0.6–4.47)
LYMPHOCYTES NFR BLD AUTO: 27 % (ref 14–44)
MAGNESIUM SERPL-MCNC: 2.1 MG/DL (ref 1.9–2.7)
MCH RBC QN AUTO: 30.9 PG (ref 26.8–34.3)
MCHC RBC AUTO-ENTMCNC: 32.1 G/DL (ref 31.4–37.4)
MCV RBC AUTO: 96 FL (ref 82–98)
MONOCYTES # BLD AUTO: 0.44 THOUSAND/ÂΜL (ref 0.17–1.22)
MONOCYTES NFR BLD AUTO: 7 % (ref 4–12)
NEUTROPHILS # BLD AUTO: 4.35 THOUSANDS/ÂΜL (ref 1.85–7.62)
NEUTS SEG NFR BLD AUTO: 65 % (ref 43–75)
NONHDLC SERPL-MCNC: 188 MG/DL
NRBC BLD AUTO-RTO: 0 /100 WBCS
PLATELET # BLD AUTO: 299 THOUSANDS/UL (ref 149–390)
PMV BLD AUTO: 9.8 FL (ref 8.9–12.7)
POTASSIUM SERPL-SCNC: 4.5 MMOL/L (ref 3.5–5.3)
PROT SERPL-MCNC: 6.8 G/DL (ref 6.4–8.4)
RBC # BLD AUTO: 4.44 MILLION/UL (ref 3.81–5.12)
SODIUM SERPL-SCNC: 137 MMOL/L (ref 135–147)
TIBC SERPL-MCNC: 302.4 UG/DL (ref 250–450)
TRANSFERRIN SERPL-MCNC: 216 MG/DL (ref 203–362)
TRIGL SERPL-MCNC: 108 MG/DL (ref ?–150)
TSH SERPL DL<=0.05 MIU/L-ACNC: 1.56 UIU/ML (ref 0.45–4.5)
UIBC SERPL-MCNC: 220 UG/DL (ref 155–355)
VIT B12 SERPL-MCNC: 224 PG/ML (ref 180–914)
WBC # BLD AUTO: 6.67 THOUSAND/UL (ref 4.31–10.16)

## 2025-06-20 PROCEDURE — 36415 COLL VENOUS BLD VENIPUNCTURE: CPT

## 2025-06-20 PROCEDURE — 80061 LIPID PANEL: CPT

## 2025-06-20 PROCEDURE — 83735 ASSAY OF MAGNESIUM: CPT

## 2025-06-20 PROCEDURE — 80053 COMPREHEN METABOLIC PANEL: CPT

## 2025-06-20 PROCEDURE — 83550 IRON BINDING TEST: CPT

## 2025-06-20 PROCEDURE — 83540 ASSAY OF IRON: CPT

## 2025-06-20 PROCEDURE — 85025 COMPLETE CBC W/AUTO DIFF WBC: CPT

## 2025-06-20 PROCEDURE — 82607 VITAMIN B-12: CPT

## 2025-06-20 PROCEDURE — 84443 ASSAY THYROID STIM HORMONE: CPT

## 2025-06-20 PROCEDURE — 83036 HEMOGLOBIN GLYCOSYLATED A1C: CPT

## 2025-06-20 PROCEDURE — 82306 VITAMIN D 25 HYDROXY: CPT

## 2025-06-23 ENCOUNTER — RESULTS FOLLOW-UP (OUTPATIENT)
Dept: INTERNAL MEDICINE CLINIC | Facility: CLINIC | Age: 50
End: 2025-06-23

## 2025-07-07 ENCOUNTER — APPOINTMENT (OUTPATIENT)
Dept: PHYSICAL THERAPY | Facility: CLINIC | Age: 50
End: 2025-07-07
Attending: PODIATRIST
Payer: COMMERCIAL

## 2025-07-14 ENCOUNTER — EVALUATION (OUTPATIENT)
Dept: PHYSICAL THERAPY | Facility: CLINIC | Age: 50
End: 2025-07-14
Attending: PODIATRIST
Payer: COMMERCIAL

## 2025-07-14 DIAGNOSIS — M72.2 PLANTAR FASCIITIS: Primary | ICD-10-CM

## 2025-07-14 DIAGNOSIS — M24.573 EQUINUS CONTRACTURE OF ANKLE: ICD-10-CM

## 2025-07-14 DIAGNOSIS — G60.0 CHARCOT-MARIE-TOOTH DISEASE: ICD-10-CM

## 2025-07-14 PROCEDURE — 97110 THERAPEUTIC EXERCISES: CPT

## 2025-07-14 PROCEDURE — 97112 NEUROMUSCULAR REEDUCATION: CPT

## 2025-07-14 NOTE — PROGRESS NOTES
PT Re-Evaluation     Today's date: 2025  Patient name: Brandie Gloria  : 1975  MRN: [id number]  Referring provider: Jonas Looney*  Dx:   Encounter Diagnosis     ICD-10-CM    1. Plantar fasciitis  M72.2       2. Equinus contracture of ankle  M24.573       3. 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 Since SOC, noting significant improvement in B ankle and foot strength and ROM. Improvement in foot/toe intrinsic strength and motor control demo improved stability. Improvement in ankle active/passive motion with significantly less reports of pain. Reporting pain is now about 1/10 in R foot/ankle; no pain in L ankle. Improvement in B hip and knee strength as well. Pt reporting she was on vacation and did a lot of walking/hiking on uneven surfaces and reports she felt significant improvement in balance and strength. Noting significant improvement in ankle and hip righting reactions and use of stepping strategies if losing balance. Improvement in single leg balance and dynamic balance on compliant surfaces as well. Pt does continue with weakness into B ankle eversion/inversion. Continues to be challenged with dynamic balance with more complex balance activities and on compliant surfaces. Pt reports symptoms feel about 40-50% improved overall. Overall, pt is progressing well towards achievement of STG/LTG; all STG achieved. Pt will continue to benefit from skilled PT services 1x/wk for another 4 weeks to  "address continued impairments that continue to impact overall function, performance of ADLs/IADLs, and mobility. Exam findings were discussed with pt and all questions answered to pt satisfaction.         Understanding of Dx/Px/POC: good     Prognosis: good    Goals  STGs to be achieved in 4 weeks:  1. Pt to demonstrate reduced subjective pain rating \"at worst\" by at least 2-3 points from Initial Eval in order to allow for reduced pain with ADLs and improved functional activity tolerance. MET  2. Pt to demonstrate increased PROM/AROM of R/L ankle in all deficient planes by at least 5-10 degrees in order to maximize joint mobility and function and allow for progression of exercise program and achievement of goals. MET  3. Pt to demonstrate increased MMT of R/L ankle in all deficient planes by at least 1/2-1 grade in order to improve safety and stability with ADLs and functional mobility. MET      LTGs to be achieved by discharge:  1. Pt will be I with HEP in order to continue to improve quality of life and independence and reduce risk for re-injury. MET  2. Pt to demonstrate return to hobby of walking/hiking without limitations or restrictions due to L foot pain. MET  3. Pt to demonstrate improved function as noted by achieving or exceeding predicted score on FOTO outcomes assessment tool. PROGRESSING  4. Pt to report at least 75% improvement in symptoms with performance of IADLs/IADLs. PROGRESSING  5. Pt to report at least 75% improvement in symptoms with work activities PROGRESSING  6. Pt will demonstrate ability to perform stair climbing with reciprocal pattern MET      Plan  Patient would benefit from: skilled physical therapy  Planned modality interventions: cryotherapy and thermotherapy: hydrocollator packs    Planned therapy interventions: abdominal trunk stabilization, IASTM, joint mobilization, manual therapy, massage, motor coordination training, balance, neuromuscular re-education, patient/caregiver " education, postural training, strengthening, stretching, therapeutic activities, therapeutic exercise, home exercise program, gait training, flexibility, coordination and body mechanics training    Frequency: 1-2x week  Duration in weeks: 4  Plan of Care beginning date: 7/14/2025  Plan of Care expiration date: 8/25/2025  Treatment plan discussed with: patient      Subjective Evaluation    History of Present Illness  Mechanism of injury: Pt is presenting to [facility] with chief complaint of  R and L heel and foot pain. Reports this started in December. Reports she had x-ray and was diagnosed with heel spur and pf and was provided exercises. Reports she was seen by podiatry and was diagnosed with CMT. Reports that L leg is not as painful. Reports that she had neuropathic pain in L leg. Reports pain in R foot is around medial arch and feels deep. Reports pain is increased with walking, standing, and  being barefoot. 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 Reports she takes ibuprofen if needed. Reports numbness/tingling in B feet and calves. Reports that pain is alleviated with rest. Reports that she is always wearing sneakers or crocs. Reports that she loves to hike and run and reports she would run on treadmill and on ground and would like to get back to this.     Reports she has balance difficulties and ankle weakness.     7/14/25: Pt reports significant improvement since starting PT. Reports significant improvement in pain levels overall in R foot. Reports pain is at about 1/10. Reports she did change shoes as well which did help as well. Reports she went to trip to [place] and reports she noticed a big difference in her function. Reports balance and strength was significantly improved and she had minimal pain in feet. Reports she felt more balance on uneven surfaces and did not fatigue as quickly. Reports she is happy with progress thus far. Reports symptoms feel about 40-50% improved.           Recurrent  probem    Patient Goals  Patient goals for therapy: decreased pain, improved balance, increased motion, increased strength, independence with ADLs/IADLs and return to sport/leisure activities  Patient goal: get stronger and prevent my diagnosis from progressing PROGRESSING  Pain  Current pain ratin  At worst pain ratin  Location: R/L foot  Quality: dull ache, discomfort and throbbing  Relieving factors: rest, relaxation and medications  Aggravating factors: standing, walking, stair climbing and lifting  Progression: no change    Social Support  Steps to enter house: yes  Stairs in house: yes   12  Lives in: multiple-level home  Lives with: spouse    Employment status: working  Treatments  Current treatment: physical therapy        Objective     Tenderness   Left Ankle/Foot   No tenderness in the anterior ankle, anterior talofibular ligament, calcaneofibular ligament, deltoid ligament and plantar fascia.     Right Ankle/Foot   No tenderness in the anterior ankle, anterior talofibular ligament, calcaneofibular ligament, deltoid ligament, peroneal tendon, plantar fascia and posterior tibial tendon.     Neurological Testing     Sensation     Ankle/Foot   Left Ankle/Foot   Intact: light touch    Right Ankle/Foot   Intact: light touch     Reflexes   Left   Clonus sign: negative    Right   Clonus sign: negative    Active Range of Motion   Left Ankle/Foot   Dorsiflexion (ke): 8 degrees   Plantar flexion: 65 degrees   Inversion: 30 degrees   Eversion: 30 degrees     Right Ankle/Foot   Dorsiflexion (ke): 7 degrees   Plantar flexion: 65 degrees   Inversion: 30 degrees   Eversion: 33 degrees     Passive Range of Motion   Left Ankle/Foot    Dorsiflexion (ke): 10 degrees   Plantar flexion: 68 degrees   Inversion: 35 degrees   Eversion: 35 degrees     Right Ankle/Foot    Dorsiflexion (ke): 10 degrees   Plantar flexion: 68 degrees   Inversion: 35 degrees   Eversion: 35 degrees     Strength/Myotome Testing     Left Hip  "  Planes of Motion   Flexion: 4  Extension: 4  Abduction: 4  Adduction: 4+  External rotation: 4+  Internal rotation: 4+    Right Hip   Planes of Motion   Flexion: 4  Extension: 4  Abduction: 4  Adduction: 4+  External rotation: 4+  Internal rotation: 4+    Left Knee   Flexion: 4+  Extension: 4+  Quadriceps contraction: good    Right Knee   Flexion: 4+  Extension: 4+  Quadriceps contraction: good    Left Ankle/Foot   Dorsiflexion: 4+  Plantar flexion: 4+  Inversion: 4+  Eversion: 4+  Great toe flexion: 4  Great toe extension: 4    Right Ankle/Foot   Dorsiflexion: 4+  Plantar flexion: 4+  Inversion: 4  Eversion: 4  Great toe flexion: 4  Great toe extension: 4    Tests     Additional Tests Details  Noting significant equinus B feet, pes cavus bilaterally     Single leg balance on foam- R: 15.4 sec L: 17.5 sec               POC 8/25/25  Date 6/19 7/14         Visit Count    Re-eval          FOTO             Pain In 1/10 R PF  1/10 R         Pain Out                   Manuals    7/14         PROM R ankle                                                        Neuro Re-Ed             Toe scrunches review           Toe yoga review           Arch lifts review           Fwd,lat lunge onto bosu             Tandem stance on foam Upcoming           Tidal tank exercises              Wobble board fwd/lat                Step ups on bosu ball x20 ea on blue portion     Squats on bosu 2x10  Step ups on bosu ball x20 ea on blue portion    Cont NV           Single leg balance on foam 4x30\"   Single leg balance on foam 4x30\"          Ther Ex             Nustep L4 10'  L4 10'          Self pf stretch             Calf stretch 1 min  Incline board           HR with eccentric lowering             HR with ball squeeze at heel             Ankle AROM with tb Blue tb 2x10 df/inv/ev  R ankle   Blue tb 2x10 df/inv/ev  R ankle          Great Toe ext/flx with tb             Step ups      Step downs  Step down x20 ea 6\"  Cont NV           SLR " "supine with TA 2x10     SLR hip abduction 2x10        Bridges 2x10 3-5\" hold  SLR supine with TA 2x10     SLR hip abduction 2x10        Bridges 2x10 3-5\" hold                                     Ther Activity                                         Gait Training                                         Modalities             CP                                    5/1/2025  - HEP was issued and reviewed this date for above noted exercises. Pt demonstrated understanding without incident and without questions/concerns. Will continue to update upcoming.         "

## 2025-07-21 ENCOUNTER — OFFICE VISIT (OUTPATIENT)
Dept: PHYSICAL THERAPY | Facility: CLINIC | Age: 50
End: 2025-07-21
Attending: PODIATRIST
Payer: COMMERCIAL

## 2025-07-21 DIAGNOSIS — M72.2 PLANTAR FASCIITIS: Primary | ICD-10-CM

## 2025-07-21 DIAGNOSIS — M24.573 EQUINUS CONTRACTURE OF ANKLE: ICD-10-CM

## 2025-07-21 DIAGNOSIS — G60.0 CHARCOT-MARIE-TOOTH DISEASE: ICD-10-CM

## 2025-07-21 PROCEDURE — 97110 THERAPEUTIC EXERCISES: CPT

## 2025-07-21 PROCEDURE — 97112 NEUROMUSCULAR REEDUCATION: CPT

## 2025-07-21 NOTE — PROGRESS NOTES
"Daily Note     Today's date: 2025  Patient name: Brandie Gloria  : 1975  MRN: [id number]  Referring provider: Jonas Looney*  Dx:   Encounter Diagnosis     ICD-10-CM    1. Plantar fasciitis  M72.2       2. 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 Able to complete POC without incident. Less quick to fatigue with ankle PREs this session with less pain into ankle eversion. Improving dynamic stability/balance on firm and uneven surfaces; improving righting reactions and appropriate stepping reactions. No increase in symptoms with performance of PREs with appropriate fatigue. Added wobble board to improve weight shifting with appropriate challenge. Will cont to progress as tolerated. Patient demonstrated fatigue post treatment and would benefit from continued PT      Plan: Continue per plan of care.  Progress treatment as tolerated.       POC 25  Date        Visit Count    Re-eval          FOTO             Pain In 1/10 R PF  1/10 R         Pain Out                   Manuals           PROM R ankle                                                        Neuro Re-Ed             Toe scrunches review           Toe yoga review           Arch lifts review           Fwd,lat lunge onto bosu             Tandem stance on foam Upcoming           Tidal tank exercises              Wobble board fwd/lat       x20 ant/post         Step ups on bosu ball x20 ea on blue portion     Squats on bosu 2x10  Step ups on bosu ball x20 ea on blue portion    Cont NV  Step ups on bosu ball x20 ea on blue portion    Squats on bosu 2x10         Single leg balance on foam 4x30\"   Single leg balance on foam 4x30\"   " "Single leg balance on foam 4x30\"        Ther Ex             Nustep L4 10'  L4 10'   L4 8'        Self pf stretch             Calf stretch 1 min  Incline board    3x30\"        HR with eccentric lowering             HR with ball squeeze at heel             Ankle AROM with tb Blue tb 2x10 df/inv/ev  R ankle   Blue tb 2x10 df/inv/ev  R ankle   Blue tb 2x10 df/inv/ev  R ankle        Great Toe ext/flx with tb             Step ups      Step downs  Step down x20 ea 6\"  Cont NV           SLR supine with TA 2x10     SLR hip abduction 2x10        Bridges 2x10 3-5\" hold  SLR supine with TA 2x10     SLR hip abduction 2x10        Bridges 2x10 3-5\" hold  SLR supine with TA 2x10     SLR hip abduction 2x10        Bridges 2x10 3-5\" hold                                   Ther Activity                                         Gait Training                                         Modalities             CP                                    5/1/2025  - HEP was issued and reviewed this date for above noted exercises. Pt demonstrated understanding without incident and without questions/concerns. Will continue to update upcoming.         "

## 2025-07-28 ENCOUNTER — OFFICE VISIT (OUTPATIENT)
Dept: PHYSICAL THERAPY | Facility: CLINIC | Age: 50
End: 2025-07-28
Attending: PODIATRIST
Payer: COMMERCIAL

## 2025-07-28 DIAGNOSIS — M72.2 PLANTAR FASCIITIS: Primary | ICD-10-CM

## 2025-07-28 DIAGNOSIS — G60.0 CHARCOT-MARIE-TOOTH DISEASE: ICD-10-CM

## 2025-07-28 DIAGNOSIS — M24.573 EQUINUS CONTRACTURE OF ANKLE: ICD-10-CM

## 2025-07-28 PROCEDURE — 97112 NEUROMUSCULAR REEDUCATION: CPT

## 2025-07-28 PROCEDURE — 97110 THERAPEUTIC EXERCISES: CPT

## (undated) DEVICE — NEEDLE 25G X 1 1/2

## (undated) DEVICE — GAUZE SPONGES,8 PLY: Brand: CURITY

## (undated) DEVICE — TROCAR: Brand: KII SLEEVE

## (undated) DEVICE — CHLORAPREP HI-LITE 26ML ORANGE

## (undated) DEVICE — SUT VICRYL 0 UR-6 27 IN J603H

## (undated) DEVICE — ENDOPOUCH RETRIEVER SPECIMEN RETRIEVAL BAGS: Brand: ENDOPOUCH RETRIEVER

## (undated) DEVICE — ETS45 RELOAD STANDARD 45MM: Brand: ENDOPATH

## (undated) DEVICE — ENSEAL X1 TISSUE SEALER, CURVED JAW, 37 CM SHAFT LENGTH: Brand: ENSEAL

## (undated) DEVICE — TROCARS: Brand: KII® BALLOON BLUNT TIP SYSTEM

## (undated) DEVICE — INTENDED FOR TISSUE SEPARATION, AND OTHER PROCEDURES THAT REQUIRE A SHARP SURGICAL BLADE TO PUNCTURE OR CUT.: Brand: BARD-PARKER ® CARBON RIB-BACK BLADES

## (undated) DEVICE — ENDOPATH 5MM ENDOSCOPIC BLUNT TIP DISSECTORS (12 POUCHES CONTAINING 3 DISSECTORS EACH): Brand: ENDOPATH

## (undated) DEVICE — SUT MONOCRYL 4-0 PS-2 27 IN Y426H

## (undated) DEVICE — ENDOPATH ETS-FLEX45 ARTICULATING ENDOSCOPIC LINEAR CUTTER, NO RELOAD: Brand: ENDOPATH

## (undated) DEVICE — INTENDED FOR TISSUE SEPARATION, AND OTHER PROCEDURES THAT REQUIRE A SHARP SURGICAL BLADE TO PUNCTURE OR CUT.: Brand: BARD-PARKER SAFETY BLADES SIZE 15, STERILE

## (undated) DEVICE — LAPAROSCOPY PACK: Brand: MEDLINE INDUSTRIES, INC.

## (undated) DEVICE — TUBE SET SMOKE EVAC PNEUMOCLEAR HIGH FLOW

## (undated) DEVICE — PLUMEPEN PRO 10FT

## (undated) DEVICE — GLOVE SRG BIOGEL ECLIPSE 7

## (undated) DEVICE — GLOVE SRG LF STRL BGL SKNSNS 7 PF

## (undated) DEVICE — 3M™ TEGADERM™ TRANSPARENT FILM DRESSING FRAME STYLE, 1624W, 2-3/8 IN X 2-3/4 IN (6 CM X 7 CM), 100/CT 4CT/CASE: Brand: 3M™ TEGADERM™

## (undated) DEVICE — ADHESIVE SKIN HIGH VISCOSITY EXOFIN 1ML

## (undated) DEVICE — ENDOPATH 5 MM GRASPERS WITH RATCHET HANDLES: Brand: ENDOPATH

## (undated) DEVICE — IRRIG ENDO FLO TUBING